# Patient Record
Sex: MALE | Race: WHITE | NOT HISPANIC OR LATINO | Employment: OTHER | URBAN - NONMETROPOLITAN AREA
[De-identification: names, ages, dates, MRNs, and addresses within clinical notes are randomized per-mention and may not be internally consistent; named-entity substitution may affect disease eponyms.]

---

## 2017-02-23 ENCOUNTER — OFFICE VISIT (OUTPATIENT)
Dept: NEUROSURGERY | Facility: CLINIC | Age: 62
End: 2017-02-23

## 2017-02-23 VITALS
TEMPERATURE: 97.2 F | SYSTOLIC BLOOD PRESSURE: 144 MMHG | RESPIRATION RATE: 20 BRPM | BODY MASS INDEX: 31.36 KG/M2 | WEIGHT: 224 LBS | DIASTOLIC BLOOD PRESSURE: 81 MMHG | OXYGEN SATURATION: 95 % | HEART RATE: 69 BPM | HEIGHT: 71 IN

## 2017-02-23 DIAGNOSIS — M51.26 HERNIATED LUMBAR INTERVERTEBRAL DISC: Primary | ICD-10-CM

## 2017-02-23 PROCEDURE — 99203 OFFICE O/P NEW LOW 30 MIN: CPT | Performed by: NEUROLOGICAL SURGERY

## 2017-02-23 RX ORDER — OXYCODONE AND ACETAMINOPHEN 7.5; 325 MG/1; MG/1
TABLET ORAL
Refills: 0 | COMMUNITY
Start: 2016-11-18 | End: 2017-04-22 | Stop reason: HOSPADM

## 2017-02-23 RX ORDER — PIOGLITAZONEHYDROCHLORIDE 30 MG/1
30 TABLET ORAL DAILY
COMMUNITY
Start: 2017-02-22 | End: 2020-11-20

## 2017-02-23 RX ORDER — LEVOTHYROXINE SODIUM 0.05 MG/1
75 TABLET ORAL DAILY
COMMUNITY
End: 2017-10-02 | Stop reason: DRUGHIGH

## 2017-02-23 RX ORDER — METAXALONE 800 MG/1
800 TABLET ORAL 3 TIMES DAILY PRN
COMMUNITY
End: 2017-04-20

## 2017-02-23 RX ORDER — GLYBURIDE-METFORMIN HYDROCHLORIDE 5; 500 MG/1; MG/1
2 TABLET ORAL 2 TIMES DAILY WITH MEALS
COMMUNITY
Start: 2017-02-14 | End: 2020-11-20

## 2017-02-23 RX ORDER — CITALOPRAM 40 MG/1
40 TABLET ORAL DAILY
COMMUNITY

## 2017-02-23 RX ORDER — HYDROCODONE BITARTRATE AND ACETAMINOPHEN 7.5; 325 MG/1; MG/1
1 TABLET ORAL EVERY 8 HOURS PRN
COMMUNITY
End: 2017-04-22 | Stop reason: HOSPADM

## 2017-02-23 RX ORDER — INDOMETHACIN 50 MG/1
50 CAPSULE ORAL 3 TIMES DAILY PRN
COMMUNITY
End: 2017-04-20

## 2017-02-23 RX ORDER — SIMVASTATIN 40 MG
40 TABLET ORAL NIGHTLY
COMMUNITY
Start: 2017-02-10 | End: 2020-11-20

## 2017-02-23 NOTE — PROGRESS NOTES
Grant Ann  1955  [id number]      Chief Complaint   Patient presents with   • Back Pain     sharp pains, worse when sitting, standing walking for long periods   • Numbness     in left leg       HISTORY OF PRESENT ILLNESS:  This is a 61-year-old who sustained a work-related injury 6/16 with the onset of pain in his back range to his left lower extremity.  This is been refractory to physical therapy of several weeks.  He is had his right knee operated on.  He is had a lumbar MRI and is referred for neurosurgical consultation.     Past Medical History   Diagnosis Date   • Arthritis    • Diabetes mellitus    • Low back pain        Past Surgical History   Procedure Laterality Date   • Knee arthroscopy w/ meniscal repair         Family History   Problem Relation Age of Onset   • No Known Problems Mother    • No Known Problems Father        Social History     Social History   • Marital status:      Spouse name: N/A   • Number of children: N/A   • Years of education: N/A     Occupational History   • Not on file.     Social History Main Topics   • Smoking status: Never Smoker   • Smokeless tobacco: Not on file   • Alcohol use No   • Drug use: Defer   • Sexual activity: Defer     Other Topics Concern   • Not on file     Social History Narrative   • No narrative on file       No Known Allergies      Current Outpatient Prescriptions:   •  Canagliflozin (INVOKANA) 100 MG tablet, Take  by mouth., Disp: , Rfl:   •  citalopram (CeleXA) 20 MG tablet, Take 20 mg by mouth Daily., Disp: , Rfl:   •  glyBURIDE-metFORMIN (GLUCOVANCE) 5-500 MG per tablet, , Disp: , Rfl:   •  HYDROcodone-acetaminophen (NORCO) 7.5-325 MG per tablet, Take 1 tablet by mouth Every 6 (Six) Hours As Needed for moderate pain (4-6)., Disp: , Rfl:   •  HYDROcodone-acetaminophen (VICODIN) 5-500 MG per tablet, Take 1 tablet by mouth Every 6 (Six) Hours As Needed for moderate pain (4-6)., Disp: , Rfl:   •  indomethacin (INDOCIN) 50 MG capsule, Take 50  "mg by mouth 3 (Three) Times a Day As Needed for mild pain (1-3)., Disp: , Rfl:   •  levothyroxine (SYNTHROID, LEVOTHROID) 50 MCG tablet, Take 50 mcg by mouth Daily., Disp: , Rfl:   •  metaxalone (SKELAXIN) 800 MG tablet, Take 800 mg by mouth 3 (Three) Times a Day As Needed for muscle spasms., Disp: , Rfl:   •  oxyCODONE-acetaminophen (PERCOCET) 7.5-325 MG per tablet, TK 1 T PO Q 4 H PRN P., Disp: , Rfl: 0  •  pioglitazone (ACTOS) 30 MG tablet, , Disp: , Rfl:   •  simvastatin (ZOCOR) 40 MG tablet, , Disp: , Rfl:     Review of Systems   Musculoskeletal: Positive for back pain and myalgias.   Neurological: Positive for numbness.   All other systems reviewed and are negative.      Neurological Examination:    Vitals:    02/23/17 1435   BP: 144/81   BP Location: Right arm   Patient Position: Sitting   Cuff Size: Adult   Pulse: 69   Resp: 20   Temp: 97.2 °F (36.2 °C)   TempSrc: Oral   SpO2: 95%   Weight: 224 lb (102 kg)   Height: 71\" (180.3 cm)       Mental status/speech: The patient is alert and oriented.  Speech is clear without aphysia or dysarthria.  No overt cognitive deficits.    Cranial nerve examination:    Olfaction: Smell is intact.  Vision: Vision is intact without visual field abnormalities.  Funduscopic examination is normal.  No pupillary irregularity.  Ocular motor examination: The extraocular muscles are intact.  There is no diplopia.  The pupil is round and reactive to both light and accommodation.  There is no nystagmus.  Facial movement/sensation: There is no facial weakness.  Sensation is intact in the first, second, and third divisions of the trigeminal nerve.  The corneal reflex is intact.  Auditory: Hearing is intact to finger rub bilaterally.  Cranial nerves IX, X, XI, XII: Phonation is normal.  No dysphagia.  Tongue is protruded in the midline without atrophy.  The gag reflex is intact.  Shoulder shrug is normal.    Musculoligamentous ligamentous examination: He has a brace on his right knee.  He " "has decreased range of motion.  He has a mildly positive straight leg raising on the left.  The left patellar reflex is diminished as is the right.    Medical Decision Making:     Diagnostic Data Set:  He is had a lumbar MRI.  I \"think\" it shows an extreme lateral disc protrusion L3/4 deviating toward left side.  It is been interpreted, however is showing disc degeneration without protrusion.      Assessment:  Possible disc herniation L3/4, extreme lateral           Recommendations:  I've recommended myelography, post-Myelogram CT scan and EMG and NCV both lower extremities.  He has subjective numbness in the distribution of the peroneal nerve on the right side where he has had recent knee operation.  He also needs to have a EMG and NCV on the left leg.        I greatly appreciate the opportunity to see and evaluate this individual.  If you have questions or concerns regarding issues that I may have overlooked please call me at any time: [phone].  Juarez Puente M.D.  [facility]  [address] [place] [address].  [place] [address]    "

## 2017-03-08 ENCOUNTER — HOSPITAL ENCOUNTER (OUTPATIENT)
Dept: INTERVENTIONAL RADIOLOGY/VASCULAR | Facility: HOSPITAL | Age: 62
Discharge: HOME | End: 2017-03-08
Attending: NEUROLOGICAL SURGERY | Admitting: NEUROLOGICAL SURGERY

## 2017-03-08 ENCOUNTER — APPOINTMENT (OUTPATIENT)
Dept: CT IMAGING | Facility: HOSPITAL | Age: 62
End: 2017-03-08

## 2017-03-08 ENCOUNTER — HOSPITAL ENCOUNTER (OUTPATIENT)
Dept: NEUROLOGY | Facility: HOSPITAL | Age: 62
Discharge: HOME | End: 2017-03-08
Attending: NEUROLOGICAL SURGERY | Admitting: NEUROLOGICAL SURGERY

## 2017-03-08 VITALS
OXYGEN SATURATION: 98 % | HEIGHT: 71 IN | WEIGHT: 226 LBS | SYSTOLIC BLOOD PRESSURE: 152 MMHG | BODY MASS INDEX: 31.64 KG/M2 | DIASTOLIC BLOOD PRESSURE: 77 MMHG | TEMPERATURE: 97.3 F | RESPIRATION RATE: 16 BRPM | HEART RATE: 60 BPM

## 2017-03-08 LAB — GLUCOSE BLDC GLUCOMTR-MCNC: 99 MG/DL (ref 70–130)

## 2017-03-08 PROCEDURE — 95886 MUSC TEST DONE W/N TEST COMP: CPT

## 2017-03-08 PROCEDURE — 72265 MYELOGRAPHY L-S SPINE: CPT

## 2017-03-08 PROCEDURE — 63710000001 DIPHENHYDRAMINE PER 50 MG: Performed by: NEUROLOGICAL SURGERY

## 2017-03-08 PROCEDURE — 0 IOPAMIDOL 41 % SOLUTION: Performed by: NEUROLOGICAL SURGERY

## 2017-03-08 PROCEDURE — 82962 GLUCOSE BLOOD TEST: CPT

## 2017-03-08 PROCEDURE — 72131 CT LUMBAR SPINE W/O DYE: CPT

## 2017-03-08 PROCEDURE — 95910 NRV CNDJ TEST 7-8 STUDIES: CPT

## 2017-03-08 RX ORDER — LIDOCAINE HYDROCHLORIDE 10 MG/ML
10 INJECTION, SOLUTION INFILTRATION; PERINEURAL ONCE
Status: COMPLETED | OUTPATIENT
Start: 2017-03-08 | End: 2017-03-08

## 2017-03-08 RX ORDER — DIPHENHYDRAMINE HCL 50 MG
50 CAPSULE ORAL ONCE
Status: COMPLETED | OUTPATIENT
Start: 2017-03-08 | End: 2017-03-08

## 2017-03-08 RX ORDER — DIAZEPAM 5 MG/1
10 TABLET ORAL
Status: COMPLETED | OUTPATIENT
Start: 2017-03-08 | End: 2017-03-08

## 2017-03-08 RX ORDER — MELOXICAM 15 MG/1
15 TABLET ORAL DAILY
COMMUNITY
End: 2018-06-21 | Stop reason: ALTCHOICE

## 2017-03-08 RX ADMIN — IOPAMIDOL 20 ML: 408 INJECTION, SOLUTION INTRATHECAL at 09:25

## 2017-03-08 RX ADMIN — LIDOCAINE HYDROCHLORIDE 10 ML: 10 INJECTION, SOLUTION INFILTRATION; PERINEURAL at 09:25

## 2017-03-08 RX ADMIN — DIAZEPAM 10 MG: 5 TABLET ORAL at 07:38

## 2017-03-08 RX ADMIN — DIPHENHYDRAMINE HYDROCHLORIDE 50 MG: 50 CAPSULE ORAL at 07:38

## 2017-03-09 ENCOUNTER — TELEPHONE (OUTPATIENT)
Dept: INFUSION THERAPY | Facility: HOSPITAL | Age: 62
End: 2017-03-09

## 2017-03-09 NOTE — TELEPHONE ENCOUNTER
@[id number])@    Other Comments:

## 2017-04-10 ENCOUNTER — PREP FOR SURGERY (OUTPATIENT)
Dept: NEUROSURGERY | Facility: CLINIC | Age: 62
End: 2017-04-10

## 2017-04-10 DIAGNOSIS — M51.26 LUMBAR HERNIATED DISC: Primary | ICD-10-CM

## 2017-04-10 RX ORDER — HYDROCODONE BITARTRATE AND ACETAMINOPHEN 7.5; 325 MG/1; MG/1
1 TABLET ORAL ONCE
Status: CANCELLED | OUTPATIENT
Start: 2017-04-10 | End: 2017-04-10

## 2017-04-10 RX ORDER — ACETAMINOPHEN 325 MG/1
650 TABLET ORAL ONCE
Status: CANCELLED | OUTPATIENT
Start: 2017-04-10 | End: 2017-04-10

## 2017-04-10 RX ORDER — SODIUM CHLORIDE, SODIUM LACTATE, POTASSIUM CHLORIDE, CALCIUM CHLORIDE 600; 310; 30; 20 MG/100ML; MG/100ML; MG/100ML; MG/100ML
100 INJECTION, SOLUTION INTRAVENOUS CONTINUOUS
Status: CANCELLED | OUTPATIENT
Start: 2017-04-10

## 2017-04-10 RX ORDER — IBUPROFEN 200 MG
800 TABLET ORAL ONCE
Status: CANCELLED | OUTPATIENT
Start: 2017-04-10 | End: 2017-04-10

## 2017-04-10 RX ORDER — CHLORHEXIDINE GLUCONATE 4 G/100ML
SOLUTION TOPICAL
Qty: 120 ML | Refills: 0 | Status: SHIPPED | OUTPATIENT
Start: 2017-04-10 | End: 2017-04-22 | Stop reason: HOSPADM

## 2017-04-10 NOTE — H&P
"Grant Ann had his diagnostic studies today which included myelogram, post myelogram CT scan and EMG/NCV.     The EMG and NCV is normal. The CT scan and myelogram show the presence of disc herniation at L3-L4 deviating toward the left side. This provides clinical correlation to the symptom complex that he has at this time.     HISTORY OF PRESENT ILLNESS: This is a 61-year-old who sustained a work-related injury 6/16 with the onset of pain in his back range to his left lower extremity. This is been refractory to physical therapy of several weeks. He is had his right knee operated on. He is had a lumbar MRI. I \"think\" it shows an extreme lateral disc protrusion L3/4 deviating toward left side. It is been interpreted, however is showing disc degeneration without protrusion.         The diagnostic studies confirmed the presence of disc herniation which is consistent with the MRI of findings of my interpretation. I have offered surgical intervention which would include excision of a herniated disc at L3 4 on the left side. I cannot offer a 100% assurance that his symptoms will be sufficiently alleviated to [place] to return to unrestricted duty. Nevertheless, in my opinion, there is little else to offer him other than him \" living with it\".     He will take this under consideration and call our office when he is made his decision. He will be returned to the care of his primary care physician    Chief Complaint   Patient presents with   • Back Pain       sharp pains, worse when sitting, standing walking for long periods   • Numbness       in left leg         HISTORY OF PRESENT ILLNESS: This is a 61-year-old who sustained a work-related injury 6/16 with the onset of pain in his back range to his left lower extremity. This is been refractory to physical therapy of several weeks. He is had his right knee operated on. He is had a lumbar MRI and is referred for neurosurgical consultation.       Medical History         Past " Medical History   Diagnosis Date   • Arthritis     • Diabetes mellitus     • Low back pain               Surgical History          Past Surgical History   Procedure Laterality Date   • Knee arthroscopy w/ meniscal repair                      Family History   Problem Relation Age of Onset   • No Known Problems Mother     • No Known Problems Father            Social History    Social History            Social History   • Marital status:        Spouse name: N/A   • Number of children: N/A   • Years of education: N/A          Occupational History   • Not on file.           Social History Main Topics   • Smoking status: Never Smoker   • Smokeless tobacco: Not on file   • Alcohol use No   • Drug use: Defer   • Sexual activity: Defer           Other Topics Concern   • Not on file          Social History Narrative   • No narrative on file            No Known Allergies        Current Outpatient Prescriptions:   • Canagliflozin (INVOKANA) 100 MG tablet, Take by mouth., Disp: , Rfl:   • citalopram (CeleXA) 20 MG tablet, Take 20 mg by mouth Daily., Disp: , Rfl:   • glyBURIDE-metFORMIN (GLUCOVANCE) 5-500 MG per tablet, , Disp: , Rfl:   • HYDROcodone-acetaminophen (NORCO) 7.5-325 MG per tablet, Take 1 tablet by mouth Every 6 (Six) Hours As Needed for moderate pain (4-6)., Disp: , Rfl:   • HYDROcodone-acetaminophen (VICODIN) 5-500 MG per tablet, Take 1 tablet by mouth Every 6 (Six) Hours As Needed for moderate pain (4-6)., Disp: , Rfl:   • indomethacin (INDOCIN) 50 MG capsule, Take 50 mg by mouth 3 (Three) Times a Day As Needed for mild pain (1-3)., Disp: , Rfl:   • levothyroxine (SYNTHROID, LEVOTHROID) 50 MCG tablet, Take 50 mcg by mouth Daily., Disp: , Rfl:   • metaxalone (SKELAXIN) 800 MG tablet, Take 800 mg by mouth 3 (Three) Times a Day As Needed for muscle spasms., Disp: , Rfl:   • oxyCODONE-acetaminophen (PERCOCET) 7.5-325 MG per tablet, TK 1 T PO Q 4 H PRN P., Disp: , Rfl: 0  • pioglitazone (ACTOS) 30 MG tablet, ,  "Disp: , Rfl:   • simvastatin (ZOCOR) 40 MG tablet, , Disp: , Rfl:      Review of Systems   Musculoskeletal: Positive for back pain and myalgias.   Neurological: Positive for numbness.   All other systems reviewed and are negative.        Neurological Examination:      Vitals        Vitals:     02/23/17 1435   BP: 144/81   BP Location: Right arm   Patient Position: Sitting   Cuff Size: Adult   Pulse: 69   Resp: 20   Temp: 97.2 °F (36.2 °C)   TempSrc: Oral   SpO2: 95%   Weight: 224 lb (102 kg)   Height: 71\" (180.3 cm)            Mental status/speech: The patient is alert and oriented. Speech is clear without aphysia or dysarthria. No overt cognitive deficits.     Cranial nerve examination:     Olfaction: Smell is intact.  Vision: Vision is intact without visual field abnormalities. Funduscopic examination is normal. No pupillary irregularity.  Ocular motor examination: The extraocular muscles are intact. There is no diplopia. The pupil is round and reactive to both light and accommodation. There is no nystagmus.  Facial movement/sensation: There is no facial weakness. Sensation is intact in the first, second, and third divisions of the trigeminal nerve. The corneal reflex is intact.  Auditory: Hearing is intact to finger rub bilaterally.  Cranial nerves IX, X, XI, XII: Phonation is normal. No dysphagia. Tongue is protruded in the midline without atrophy. The gag reflex is intact. Shoulder shrug is normal.     Musculoligamentous ligamentous examination: He has a brace on his right knee. He has decreased range of motion. He has a mildly positive straight leg raising on the left. The left patellar reflex is diminished as is the right.     Medical Decision Making:     Diagnostic Data Set: He is had a lumbar MRI. I \"think\" it shows an extreme lateral disc protrusion L3/4 deviating toward left side. It is been interpreted, however is showing disc degeneration without protrusion.     Assessment: Possible disc herniation " L3/4, extreme lateral       Recommendations: I've recommended myelography, post-Myelogram CT scan and EMG and NCV both lower extremities. He has subjective numbness in the distribution of the peroneal nerve on the right side where he has had recent knee operation. He also needs to have a EMG and NCV on the left leg.

## 2017-04-20 ENCOUNTER — APPOINTMENT (OUTPATIENT)
Dept: PREADMISSION TESTING | Facility: HOSPITAL | Age: 62
End: 2017-04-20

## 2017-04-20 VITALS — WEIGHT: 228.62 LBS | BODY MASS INDEX: 32.01 KG/M2 | HEIGHT: 71 IN

## 2017-04-20 DIAGNOSIS — M51.26 LUMBAR HERNIATED DISC: ICD-10-CM

## 2017-04-20 LAB
ANION GAP SERPL CALCULATED.3IONS-SCNC: 6 MMOL/L (ref 3–11)
BASOPHILS # BLD AUTO: 0.03 10*3/MM3 (ref 0–0.2)
BASOPHILS NFR BLD AUTO: 0.4 % (ref 0–1)
BUN BLD-MCNC: 19 MG/DL (ref 9–23)
BUN/CREAT SERPL: 19 (ref 7–25)
CALCIUM SPEC-SCNC: 10.5 MG/DL (ref 8.7–10.4)
CHLORIDE SERPL-SCNC: 104 MMOL/L (ref 99–109)
CO2 SERPL-SCNC: 32 MMOL/L (ref 20–31)
CREAT BLD-MCNC: 1 MG/DL (ref 0.6–1.3)
DEPRECATED RDW RBC AUTO: 46.7 FL (ref 37–54)
EOSINOPHIL # BLD AUTO: 0.16 10*3/MM3 (ref 0.1–0.3)
EOSINOPHIL NFR BLD AUTO: 2.3 % (ref 0–3)
ERYTHROCYTE [DISTWIDTH] IN BLOOD BY AUTOMATED COUNT: 12.9 % (ref 11.3–14.5)
GFR SERPL CREATININE-BSD FRML MDRD: 76 ML/MIN/1.73
GLUCOSE BLD-MCNC: 112 MG/DL (ref 70–100)
HCT VFR BLD AUTO: 43.2 % (ref 38.9–50.9)
HGB BLD-MCNC: 14 G/DL (ref 13.1–17.5)
IMM GRANULOCYTES # BLD: 0.02 10*3/MM3 (ref 0–0.03)
IMM GRANULOCYTES NFR BLD: 0.3 % (ref 0–0.6)
LYMPHOCYTES # BLD AUTO: 2.15 10*3/MM3 (ref 0.6–4.8)
LYMPHOCYTES NFR BLD AUTO: 31 % (ref 24–44)
MCH RBC QN AUTO: 31.7 PG (ref 27–31)
MCHC RBC AUTO-ENTMCNC: 32.4 G/DL (ref 32–36)
MCV RBC AUTO: 98 FL (ref 80–99)
MONOCYTES # BLD AUTO: 0.56 10*3/MM3 (ref 0–1)
MONOCYTES NFR BLD AUTO: 8.1 % (ref 0–12)
MRSA DNA SPEC QL NAA+PROBE: NEGATIVE
NEUTROPHILS # BLD AUTO: 4.01 10*3/MM3 (ref 1.5–8.3)
NEUTROPHILS NFR BLD AUTO: 57.9 % (ref 41–71)
PLATELET # BLD AUTO: 216 10*3/MM3 (ref 150–450)
PMV BLD AUTO: 10.7 FL (ref 6–12)
POTASSIUM BLD-SCNC: 4.4 MMOL/L (ref 3.5–5.5)
RBC # BLD AUTO: 4.41 10*6/MM3 (ref 4.2–5.76)
SODIUM BLD-SCNC: 142 MMOL/L (ref 132–146)
WBC NRBC COR # BLD: 6.93 10*3/MM3 (ref 3.5–10.8)

## 2017-04-20 PROCEDURE — 93005 ELECTROCARDIOGRAM TRACING: CPT

## 2017-04-20 PROCEDURE — 93010 ELECTROCARDIOGRAM REPORT: CPT | Performed by: INTERNAL MEDICINE

## 2017-04-20 PROCEDURE — 80048 BASIC METABOLIC PNL TOTAL CA: CPT

## 2017-04-20 PROCEDURE — 87641 MR-STAPH DNA AMP PROBE: CPT

## 2017-04-20 PROCEDURE — 85025 COMPLETE CBC W/AUTO DIFF WBC: CPT

## 2017-04-20 PROCEDURE — 36415 COLL VENOUS BLD VENIPUNCTURE: CPT

## 2017-04-20 NOTE — PAT
"JACKY DOWLING NOTIFIED OF \"BOIL\" ON PATIENTS BACK.  COVERED WITH BANDAID.  SMALL AMOUNT PUS COLORED DRAINAGE NOTED.  STATED IT WAS \"OK\".  "

## 2017-04-20 NOTE — DISCHARGE INSTRUCTIONS
The following information and instructions were given:    NPO after MN except sips of water with routine prescribed medication (except blood thinner, diabetes, or weight reducing medication) unless otherwise instructed by your physician.  Do not eat, drink, smoke or chew gum after MN the night before surgery. This also includes no mints.    DO NOT shave, wear makeup or dark nail polish.    Remove all jewelry (advised to go to jeweler if unable to remove).    Leave anything you consider valuable at home.    Leave your suitcase in the car until after your surgery.    Bring the following with you (if applicable)   -picture ID and insurance cards   -Co-pay/deductible required by insurance   -Medications in the original bottles (not a list) including all over-the-counter  medications if not brought to PAT   -Copy of advance directive, living will or power of  documents if not  brought to PAT   -CPAP or BIPAP mask and tubing (do not bring machine)   -Skin prep instructions sheet   -PAT Pass   Education booklet, brochure, handout or binder given to patient.    Pain Control After Surgery handout given to patient.    Respirex use (handout given to patient) and pneumonia prevention.    Signs and Symptoms of infection.    DVT Prevention stressing the importance of ambulation.   WIPES GIVEN.  Patient to apply Chlorhexadine wipes to surgical area (as instructed) the night before procedure and the AM of procedure.

## 2017-04-21 ENCOUNTER — HOSPITAL ENCOUNTER (OUTPATIENT)
Facility: HOSPITAL | Age: 62
Setting detail: OBSERVATION
Discharge: HOME | End: 2017-04-22
Attending: NEUROLOGICAL SURGERY | Admitting: NEUROLOGICAL SURGERY

## 2017-04-21 ENCOUNTER — APPOINTMENT (OUTPATIENT)
Dept: GENERAL RADIOLOGY | Facility: HOSPITAL | Age: 62
End: 2017-04-21

## 2017-04-21 ENCOUNTER — ANESTHESIA EVENT (OUTPATIENT)
Dept: PERIOP | Facility: HOSPITAL | Age: 62
End: 2017-04-21

## 2017-04-21 ENCOUNTER — ANESTHESIA (OUTPATIENT)
Dept: PERIOP | Facility: HOSPITAL | Age: 62
End: 2017-04-21

## 2017-04-21 DIAGNOSIS — Z74.09 IMPAIRED FUNCTIONAL MOBILITY, BALANCE, GAIT, AND ENDURANCE: Primary | ICD-10-CM

## 2017-04-21 DIAGNOSIS — Z78.9 IMPAIRED MOBILITY AND ADLS: ICD-10-CM

## 2017-04-21 DIAGNOSIS — Z74.09 IMPAIRED MOBILITY AND ADLS: ICD-10-CM

## 2017-04-21 DIAGNOSIS — M51.26 LUMBAR HERNIATED DISC: ICD-10-CM

## 2017-04-21 LAB
GLUCOSE BLDC GLUCOMTR-MCNC: 247 MG/DL (ref 70–130)
GLUCOSE BLDC GLUCOMTR-MCNC: 287 MG/DL (ref 70–130)
GLUCOSE BLDC GLUCOMTR-MCNC: 75 MG/DL (ref 70–130)

## 2017-04-21 PROCEDURE — 94799 UNLISTED PULMONARY SVC/PX: CPT

## 2017-04-21 PROCEDURE — 63030 LAMOT DCMPRN NRV RT 1 LMBR: CPT | Performed by: NEUROLOGICAL SURGERY

## 2017-04-21 PROCEDURE — 25810000003 POTASSIUM CHLORIDE PER 2 MEQ: Performed by: NEUROLOGICAL SURGERY

## 2017-04-21 PROCEDURE — 25010000002 PROPOFOL 10 MG/ML EMULSION: Performed by: NURSE ANESTHETIST, CERTIFIED REGISTERED

## 2017-04-21 PROCEDURE — 25010000002 FENTANYL CITRATE (PF) 100 MCG/2ML SOLUTION: Performed by: NURSE ANESTHETIST, CERTIFIED REGISTERED

## 2017-04-21 PROCEDURE — 63710000001 DEXAMETHASONE PER 0.25 MG: Performed by: NEUROLOGICAL SURGERY

## 2017-04-21 PROCEDURE — G0378 HOSPITAL OBSERVATION PER HR: HCPCS

## 2017-04-21 PROCEDURE — 25010000003 CEFAZOLIN IN DEXTROSE 2-4 GM/100ML-% SOLUTION: Performed by: NEUROLOGICAL SURGERY

## 2017-04-21 PROCEDURE — 72020 X-RAY EXAM OF SPINE 1 VIEW: CPT

## 2017-04-21 PROCEDURE — 25010000002 DEXAMETHASONE PER 1 MG: Performed by: PHYSICIAN ASSISTANT

## 2017-04-21 PROCEDURE — 82962 GLUCOSE BLOOD TEST: CPT

## 2017-04-21 PROCEDURE — 25010000002 ONDANSETRON PER 1 MG: Performed by: NURSE ANESTHETIST, CERTIFIED REGISTERED

## 2017-04-21 PROCEDURE — 25010000002 PHENYLEPHRINE PER 1 ML: Performed by: NURSE ANESTHETIST, CERTIFIED REGISTERED

## 2017-04-21 PROCEDURE — 25010000002 NEOSTIGMINE PER 0.5 MG: Performed by: NURSE ANESTHETIST, CERTIFIED REGISTERED

## 2017-04-21 PROCEDURE — 63710000001 INSULIN LISPRO (HUMAN) PER 5 UNITS: Performed by: NURSE ANESTHETIST, CERTIFIED REGISTERED

## 2017-04-21 RX ORDER — ONDANSETRON 2 MG/ML
INJECTION INTRAMUSCULAR; INTRAVENOUS AS NEEDED
Status: DISCONTINUED | OUTPATIENT
Start: 2017-04-21 | End: 2017-04-21 | Stop reason: SURG

## 2017-04-21 RX ORDER — CEFAZOLIN SODIUM 2 G/100ML
2 INJECTION, SOLUTION INTRAVENOUS ONCE
Status: COMPLETED | OUTPATIENT
Start: 2017-04-21 | End: 2017-04-21

## 2017-04-21 RX ORDER — SODIUM CHLORIDE 9 MG/ML
INJECTION, SOLUTION INTRAVENOUS AS NEEDED
Status: DISCONTINUED | OUTPATIENT
Start: 2017-04-21 | End: 2017-04-21 | Stop reason: HOSPADM

## 2017-04-21 RX ORDER — ROCURONIUM BROMIDE 10 MG/ML
INJECTION, SOLUTION INTRAVENOUS AS NEEDED
Status: DISCONTINUED | OUTPATIENT
Start: 2017-04-21 | End: 2017-04-21 | Stop reason: SURG

## 2017-04-21 RX ORDER — HYDROCODONE BITARTRATE AND ACETAMINOPHEN 7.5; 325 MG/1; MG/1
2 TABLET ORAL EVERY 4 HOURS PRN
Status: DISCONTINUED | OUTPATIENT
Start: 2017-04-21 | End: 2017-04-22 | Stop reason: HOSPADM

## 2017-04-21 RX ORDER — ATORVASTATIN CALCIUM 40 MG/1
40 TABLET, FILM COATED ORAL NIGHTLY
Status: DISCONTINUED | OUTPATIENT
Start: 2017-04-21 | End: 2017-04-22 | Stop reason: HOSPADM

## 2017-04-21 RX ORDER — FAMOTIDINE 20 MG/1
20 TABLET, FILM COATED ORAL ONCE
Status: COMPLETED | OUTPATIENT
Start: 2017-04-21 | End: 2017-04-21

## 2017-04-21 RX ORDER — PROMETHAZINE HYDROCHLORIDE 25 MG/ML
12.5 INJECTION, SOLUTION INTRAMUSCULAR; INTRAVENOUS EVERY 6 HOURS PRN
Status: DISCONTINUED | OUTPATIENT
Start: 2017-04-21 | End: 2017-04-22 | Stop reason: HOSPADM

## 2017-04-21 RX ORDER — FENTANYL CITRATE 50 UG/ML
50 INJECTION, SOLUTION INTRAMUSCULAR; INTRAVENOUS
Status: DISCONTINUED | OUTPATIENT
Start: 2017-04-21 | End: 2017-04-21 | Stop reason: HOSPADM

## 2017-04-21 RX ORDER — LEVOTHYROXINE SODIUM 0.07 MG/1
75 TABLET ORAL DAILY
Status: DISCONTINUED | OUTPATIENT
Start: 2017-04-21 | End: 2017-04-22 | Stop reason: HOSPADM

## 2017-04-21 RX ORDER — CITALOPRAM 20 MG/1
20 TABLET ORAL DAILY
Status: DISCONTINUED | OUTPATIENT
Start: 2017-04-21 | End: 2017-04-22 | Stop reason: HOSPADM

## 2017-04-21 RX ORDER — TEMAZEPAM 15 MG/1
30 CAPSULE ORAL NIGHTLY PRN
Status: DISCONTINUED | OUTPATIENT
Start: 2017-04-21 | End: 2017-04-22 | Stop reason: HOSPADM

## 2017-04-21 RX ORDER — ALPRAZOLAM 0.5 MG/1
0.5 TABLET ORAL 3 TIMES DAILY PRN
Status: DISCONTINUED | OUTPATIENT
Start: 2017-04-21 | End: 2017-04-22 | Stop reason: HOSPADM

## 2017-04-21 RX ORDER — PROMETHAZINE HYDROCHLORIDE 12.5 MG/1
12.5 TABLET ORAL EVERY 6 HOURS PRN
Status: DISCONTINUED | OUTPATIENT
Start: 2017-04-21 | End: 2017-04-22 | Stop reason: HOSPADM

## 2017-04-21 RX ORDER — SODIUM CHLORIDE, SODIUM LACTATE, POTASSIUM CHLORIDE, CALCIUM CHLORIDE 600; 310; 30; 20 MG/100ML; MG/100ML; MG/100ML; MG/100ML
9 INJECTION, SOLUTION INTRAVENOUS CONTINUOUS
Status: DISCONTINUED | OUTPATIENT
Start: 2017-04-21 | End: 2017-04-22 | Stop reason: HOSPADM

## 2017-04-21 RX ORDER — CARISOPRODOL 350 MG/1
350 TABLET ORAL 4 TIMES DAILY PRN
Status: DISCONTINUED | OUTPATIENT
Start: 2017-04-21 | End: 2017-04-22 | Stop reason: HOSPADM

## 2017-04-21 RX ORDER — ACETAMINOPHEN 325 MG/1
650 TABLET ORAL ONCE
Status: COMPLETED | OUTPATIENT
Start: 2017-04-21 | End: 2017-04-21

## 2017-04-21 RX ORDER — PROMETHAZINE HYDROCHLORIDE 25 MG/ML
6.25 INJECTION, SOLUTION INTRAMUSCULAR; INTRAVENOUS ONCE AS NEEDED
Status: DISCONTINUED | OUTPATIENT
Start: 2017-04-21 | End: 2017-04-21 | Stop reason: HOSPADM

## 2017-04-21 RX ORDER — IBUPROFEN 800 MG/1
800 TABLET ORAL ONCE
Status: COMPLETED | OUTPATIENT
Start: 2017-04-21 | End: 2017-04-21

## 2017-04-21 RX ORDER — BUPIVACAINE HYDROCHLORIDE 2.5 MG/ML
INJECTION, SOLUTION EPIDURAL; INFILTRATION; INTRACAUDAL AS NEEDED
Status: DISCONTINUED | OUTPATIENT
Start: 2017-04-21 | End: 2017-04-21 | Stop reason: HOSPADM

## 2017-04-21 RX ORDER — SODIUM CHLORIDE 0.9 % (FLUSH) 0.9 %
1-10 SYRINGE (ML) INJECTION AS NEEDED
Status: DISCONTINUED | OUTPATIENT
Start: 2017-04-21 | End: 2017-04-21

## 2017-04-21 RX ORDER — HYDROMORPHONE HYDROCHLORIDE 1 MG/ML
0.5 INJECTION, SOLUTION INTRAMUSCULAR; INTRAVENOUS; SUBCUTANEOUS
Status: DISCONTINUED | OUTPATIENT
Start: 2017-04-21 | End: 2017-04-21 | Stop reason: HOSPADM

## 2017-04-21 RX ORDER — SODIUM CHLORIDE AND POTASSIUM CHLORIDE 150; 450 MG/100ML; MG/100ML
75 INJECTION, SOLUTION INTRAVENOUS CONTINUOUS
Status: DISCONTINUED | OUTPATIENT
Start: 2017-04-21 | End: 2017-04-22 | Stop reason: HOSPADM

## 2017-04-21 RX ORDER — ONDANSETRON 2 MG/ML
4 INJECTION INTRAMUSCULAR; INTRAVENOUS ONCE AS NEEDED
Status: DISCONTINUED | OUTPATIENT
Start: 2017-04-21 | End: 2017-04-21 | Stop reason: HOSPADM

## 2017-04-21 RX ORDER — PROPOFOL 10 MG/ML
VIAL (ML) INTRAVENOUS AS NEEDED
Status: DISCONTINUED | OUTPATIENT
Start: 2017-04-21 | End: 2017-04-21 | Stop reason: SURG

## 2017-04-21 RX ORDER — NALOXONE HCL 0.4 MG/ML
0.4 VIAL (ML) INJECTION
Status: DISCONTINUED | OUTPATIENT
Start: 2017-04-21 | End: 2017-04-22 | Stop reason: HOSPADM

## 2017-04-21 RX ORDER — LIDOCAINE HYDROCHLORIDE 10 MG/ML
1 INJECTION, SOLUTION EPIDURAL; INFILTRATION; INTRACAUDAL; PERINEURAL ONCE
Status: DISCONTINUED | OUTPATIENT
Start: 2017-04-21 | End: 2017-04-21

## 2017-04-21 RX ORDER — MELOXICAM 7.5 MG/1
15 TABLET ORAL DAILY
Status: DISCONTINUED | OUTPATIENT
Start: 2017-04-21 | End: 2017-04-22 | Stop reason: HOSPADM

## 2017-04-21 RX ORDER — LIDOCAINE HYDROCHLORIDE 10 MG/ML
2 INJECTION, SOLUTION INFILTRATION; PERINEURAL ONCE
Status: COMPLETED | OUTPATIENT
Start: 2017-04-21 | End: 2017-04-21

## 2017-04-21 RX ORDER — PIOGLITAZONEHYDROCHLORIDE 15 MG/1
30 TABLET ORAL DAILY
Status: DISCONTINUED | OUTPATIENT
Start: 2017-04-21 | End: 2017-04-22 | Stop reason: HOSPADM

## 2017-04-21 RX ORDER — NALOXONE HCL 0.4 MG/ML
0.1 VIAL (ML) INJECTION
Status: DISCONTINUED | OUTPATIENT
Start: 2017-04-21 | End: 2017-04-22 | Stop reason: HOSPADM

## 2017-04-21 RX ORDER — FAMOTIDINE 10 MG/ML
20 INJECTION, SOLUTION INTRAVENOUS ONCE
Status: DISCONTINUED | OUTPATIENT
Start: 2017-04-21 | End: 2017-04-21

## 2017-04-21 RX ORDER — SODIUM CHLORIDE 0.9 % (FLUSH) 0.9 %
1-10 SYRINGE (ML) INJECTION AS NEEDED
Status: DISCONTINUED | OUTPATIENT
Start: 2017-04-21 | End: 2017-04-22 | Stop reason: HOSPADM

## 2017-04-21 RX ORDER — SODIUM CHLORIDE, SODIUM LACTATE, POTASSIUM CHLORIDE, CALCIUM CHLORIDE 600; 310; 30; 20 MG/100ML; MG/100ML; MG/100ML; MG/100ML
100 INJECTION, SOLUTION INTRAVENOUS CONTINUOUS
Status: DISCONTINUED | OUTPATIENT
Start: 2017-04-21 | End: 2017-04-21

## 2017-04-21 RX ORDER — LIDOCAINE HYDROCHLORIDE 10 MG/ML
INJECTION, SOLUTION INFILTRATION; PERINEURAL AS NEEDED
Status: DISCONTINUED | OUTPATIENT
Start: 2017-04-21 | End: 2017-04-21 | Stop reason: SURG

## 2017-04-21 RX ORDER — GLYBURIDE-METFORMIN HYDROCHLORIDE 5; 500 MG/1; MG/1
1 TABLET ORAL 2 TIMES DAILY WITH MEALS
Status: DISCONTINUED | OUTPATIENT
Start: 2017-04-21 | End: 2017-04-21

## 2017-04-21 RX ORDER — GLYCOPYRROLATE 0.2 MG/ML
INJECTION INTRAMUSCULAR; INTRAVENOUS AS NEEDED
Status: DISCONTINUED | OUTPATIENT
Start: 2017-04-21 | End: 2017-04-21 | Stop reason: SURG

## 2017-04-21 RX ORDER — PROMETHAZINE HYDROCHLORIDE 25 MG/1
25 TABLET ORAL ONCE AS NEEDED
Status: DISCONTINUED | OUTPATIENT
Start: 2017-04-21 | End: 2017-04-21 | Stop reason: HOSPADM

## 2017-04-21 RX ORDER — PROMETHAZINE HYDROCHLORIDE 25 MG/1
25 SUPPOSITORY RECTAL ONCE AS NEEDED
Status: DISCONTINUED | OUTPATIENT
Start: 2017-04-21 | End: 2017-04-21 | Stop reason: HOSPADM

## 2017-04-21 RX ORDER — GLYBURIDE-METFORMIN HYDROCHLORIDE 5; 500 MG/1; MG/1
2 TABLET ORAL 2 TIMES DAILY WITH MEALS
Status: DISCONTINUED | OUTPATIENT
Start: 2017-04-21 | End: 2017-04-22 | Stop reason: HOSPADM

## 2017-04-21 RX ORDER — MAGNESIUM HYDROXIDE 1200 MG/15ML
LIQUID ORAL AS NEEDED
Status: DISCONTINUED | OUTPATIENT
Start: 2017-04-21 | End: 2017-04-21 | Stop reason: HOSPADM

## 2017-04-21 RX ORDER — CEFAZOLIN SODIUM 2 G/100ML
2 INJECTION, SOLUTION INTRAVENOUS EVERY 8 HOURS
Status: COMPLETED | OUTPATIENT
Start: 2017-04-21 | End: 2017-04-21

## 2017-04-21 RX ORDER — FAMOTIDINE 20 MG/1
20 TABLET, FILM COATED ORAL 2 TIMES DAILY
Status: DISCONTINUED | OUTPATIENT
Start: 2017-04-21 | End: 2017-04-22 | Stop reason: HOSPADM

## 2017-04-21 RX ORDER — FAMOTIDINE 10 MG/ML
20 INJECTION, SOLUTION INTRAVENOUS 2 TIMES DAILY
Status: DISCONTINUED | OUTPATIENT
Start: 2017-04-21 | End: 2017-04-22 | Stop reason: HOSPADM

## 2017-04-21 RX ORDER — ATRACURIUM BESYLATE 10 MG/ML
INJECTION, SOLUTION INTRAVENOUS AS NEEDED
Status: DISCONTINUED | OUTPATIENT
Start: 2017-04-21 | End: 2017-04-21 | Stop reason: SURG

## 2017-04-21 RX ORDER — PROMETHAZINE HYDROCHLORIDE 12.5 MG/1
12.5 SUPPOSITORY RECTAL EVERY 6 HOURS PRN
Status: DISCONTINUED | OUTPATIENT
Start: 2017-04-21 | End: 2017-04-22 | Stop reason: HOSPADM

## 2017-04-21 RX ORDER — FAMOTIDINE 20 MG/1
20 TABLET, FILM COATED ORAL ONCE
Status: DISCONTINUED | OUTPATIENT
Start: 2017-04-21 | End: 2017-04-21

## 2017-04-21 RX ORDER — HYDROCODONE BITARTRATE AND ACETAMINOPHEN 7.5; 325 MG/1; MG/1
1 TABLET ORAL ONCE
Status: COMPLETED | OUTPATIENT
Start: 2017-04-21 | End: 2017-04-21

## 2017-04-21 RX ORDER — FENTANYL CITRATE 50 UG/ML
INJECTION, SOLUTION INTRAMUSCULAR; INTRAVENOUS AS NEEDED
Status: DISCONTINUED | OUTPATIENT
Start: 2017-04-21 | End: 2017-04-21 | Stop reason: SURG

## 2017-04-21 RX ORDER — SODIUM CHLORIDE, SODIUM LACTATE, POTASSIUM CHLORIDE, CALCIUM CHLORIDE 600; 310; 30; 20 MG/100ML; MG/100ML; MG/100ML; MG/100ML
9 INJECTION, SOLUTION INTRAVENOUS CONTINUOUS
Status: DISCONTINUED | OUTPATIENT
Start: 2017-04-21 | End: 2017-04-21

## 2017-04-21 RX ORDER — DEXAMETHASONE 4 MG/1
4 TABLET ORAL EVERY 6 HOURS SCHEDULED
Status: DISCONTINUED | OUTPATIENT
Start: 2017-04-21 | End: 2017-04-22 | Stop reason: HOSPADM

## 2017-04-21 RX ORDER — DEXAMETHASONE SODIUM PHOSPHATE 4 MG/ML
4 INJECTION, SOLUTION INTRA-ARTICULAR; INTRALESIONAL; INTRAMUSCULAR; INTRAVENOUS; SOFT TISSUE EVERY 6 HOURS SCHEDULED
Status: DISCONTINUED | OUTPATIENT
Start: 2017-04-21 | End: 2017-04-22 | Stop reason: HOSPADM

## 2017-04-21 RX ADMIN — ATORVASTATIN CALCIUM 40 MG: 40 TABLET, FILM COATED ORAL at 21:21

## 2017-04-21 RX ADMIN — POTASSIUM CHLORIDE AND SODIUM CHLORIDE 75 ML/HR: 450; 150 INJECTION, SOLUTION INTRAVENOUS at 14:02

## 2017-04-21 RX ADMIN — Medication: at 10:05

## 2017-04-21 RX ADMIN — GLYBURIDE-METFORMIN HYDROCHLORIDE 2 TABLET: 5; 500 TABLET ORAL at 17:32

## 2017-04-21 RX ADMIN — HYDROCODONE BITARTRATE AND ACETAMINOPHEN 1 TABLET: 7.5; 325 TABLET ORAL at 06:08

## 2017-04-21 RX ADMIN — EPHEDRINE SULFATE 10 MG: 50 INJECTION INTRAMUSCULAR; INTRAVENOUS; SUBCUTANEOUS at 07:07

## 2017-04-21 RX ADMIN — ONDANSETRON 4 MG: 2 INJECTION INTRAMUSCULAR; INTRAVENOUS at 09:22

## 2017-04-21 RX ADMIN — INSULIN LISPRO 4 UNITS: 100 INJECTION, SOLUTION INTRAVENOUS; SUBCUTANEOUS at 10:40

## 2017-04-21 RX ADMIN — EPHEDRINE SULFATE 5 MG: 50 INJECTION INTRAMUSCULAR; INTRAVENOUS; SUBCUTANEOUS at 08:45

## 2017-04-21 RX ADMIN — LEVOTHYROXINE SODIUM 75 MCG: 75 TABLET ORAL at 13:59

## 2017-04-21 RX ADMIN — DEXAMETHASONE SODIUM PHOSPHATE 10 MG: 4 INJECTION, SOLUTION INTRAMUSCULAR; INTRAVENOUS at 07:02

## 2017-04-21 RX ADMIN — FENTANYL CITRATE 50 MCG: 50 INJECTION, SOLUTION INTRAMUSCULAR; INTRAVENOUS at 09:48

## 2017-04-21 RX ADMIN — MELOXICAM 15 MG: 7.5 TABLET ORAL at 14:00

## 2017-04-21 RX ADMIN — FENTANYL CITRATE 25 MCG: 50 INJECTION, SOLUTION INTRAMUSCULAR; INTRAVENOUS at 07:16

## 2017-04-21 RX ADMIN — PROPOFOL 200 MG: 10 INJECTION, EMULSION INTRAVENOUS at 06:50

## 2017-04-21 RX ADMIN — ROCURONIUM BROMIDE 10 MG: 10 INJECTION INTRAVENOUS at 07:41

## 2017-04-21 RX ADMIN — ACETAMINOPHEN 650 MG: 325 TABLET, FILM COATED ORAL at 06:08

## 2017-04-21 RX ADMIN — FENTANYL CITRATE 50 MCG: 50 INJECTION, SOLUTION INTRAMUSCULAR; INTRAVENOUS at 09:46

## 2017-04-21 RX ADMIN — Medication 5 MG: at 09:27

## 2017-04-21 RX ADMIN — FAMOTIDINE 20 MG: 20 TABLET ORAL at 17:30

## 2017-04-21 RX ADMIN — ATRACURIUM BESYLATE 10 MG: 10 INJECTION, SOLUTION INTRAVENOUS at 07:16

## 2017-04-21 RX ADMIN — SODIUM CHLORIDE, POTASSIUM CHLORIDE, SODIUM LACTATE AND CALCIUM CHLORIDE 9 ML/HR: 600; 310; 30; 20 INJECTION, SOLUTION INTRAVENOUS at 05:58

## 2017-04-21 RX ADMIN — FENTANYL CITRATE 75 MCG: 50 INJECTION, SOLUTION INTRAMUSCULAR; INTRAVENOUS at 06:50

## 2017-04-21 RX ADMIN — FAMOTIDINE 20 MG: 20 TABLET ORAL at 06:08

## 2017-04-21 RX ADMIN — PHENYLEPHRINE HYDROCHLORIDE 100 MCG: 10 INJECTION INTRAVENOUS at 09:15

## 2017-04-21 RX ADMIN — IBUPROFEN 800 MG: 800 TABLET ORAL at 06:08

## 2017-04-21 RX ADMIN — CEFAZOLIN SODIUM 2 G: 2 INJECTION, SOLUTION INTRAVENOUS at 21:21

## 2017-04-21 RX ADMIN — CITALOPRAM HYDROBROMIDE 20 MG: 20 TABLET ORAL at 13:59

## 2017-04-21 RX ADMIN — LIDOCAINE HYDROCHLORIDE 50 MG: 10 INJECTION, SOLUTION INFILTRATION; PERINEURAL at 06:50

## 2017-04-21 RX ADMIN — Medication: at 23:23

## 2017-04-21 RX ADMIN — LIDOCAINE HYDROCHLORIDE 0.2 ML: 10 INJECTION, SOLUTION EPIDURAL; INFILTRATION; INTRACAUDAL; PERINEURAL at 05:58

## 2017-04-21 RX ADMIN — PIOGLITAZONE HYDROCHLORIDE 30 MG: 15 TABLET ORAL at 14:01

## 2017-04-21 RX ADMIN — FENTANYL CITRATE 50 MCG: 50 INJECTION, SOLUTION INTRAMUSCULAR; INTRAVENOUS at 07:50

## 2017-04-21 RX ADMIN — EPHEDRINE SULFATE 5 MG: 50 INJECTION INTRAMUSCULAR; INTRAVENOUS; SUBCUTANEOUS at 08:36

## 2017-04-21 RX ADMIN — ROBINUL 0.6 MG: 0.2 INJECTION INTRAMUSCULAR; INTRAVENOUS at 09:27

## 2017-04-21 RX ADMIN — ATRACURIUM BESYLATE 20 MG: 10 INJECTION, SOLUTION INTRAVENOUS at 07:30

## 2017-04-21 RX ADMIN — DEXAMETHASONE 4 MG: 4 TABLET ORAL at 14:08

## 2017-04-21 RX ADMIN — CEFAZOLIN SODIUM 2 G: 2 INJECTION, SOLUTION INTRAVENOUS at 06:48

## 2017-04-21 RX ADMIN — CEFAZOLIN SODIUM 2 G: 2 INJECTION, SOLUTION INTRAVENOUS at 16:11

## 2017-04-21 RX ADMIN — DEXAMETHASONE 4 MG: 4 TABLET ORAL at 23:23

## 2017-04-21 RX ADMIN — PHENYLEPHRINE HYDROCHLORIDE 100 MCG: 10 INJECTION INTRAVENOUS at 08:48

## 2017-04-21 RX ADMIN — SODIUM CHLORIDE, POTASSIUM CHLORIDE, SODIUM LACTATE AND CALCIUM CHLORIDE: 600; 310; 30; 20 INJECTION, SOLUTION INTRAVENOUS at 06:48

## 2017-04-21 RX ADMIN — DEXAMETHASONE 4 MG: 4 TABLET ORAL at 17:30

## 2017-04-21 RX ADMIN — EPHEDRINE SULFATE 5 MG: 50 INJECTION INTRAMUSCULAR; INTRAVENOUS; SUBCUTANEOUS at 08:27

## 2017-04-21 RX ADMIN — ATRACURIUM BESYLATE 40 MG: 10 INJECTION, SOLUTION INTRAVENOUS at 06:50

## 2017-04-21 RX ADMIN — SODIUM CHLORIDE, POTASSIUM CHLORIDE, SODIUM LACTATE AND CALCIUM CHLORIDE: 600; 310; 30; 20 INJECTION, SOLUTION INTRAVENOUS at 07:51

## 2017-04-21 NOTE — ANESTHESIA POSTPROCEDURE EVALUATION
Patient: Grant Ann    Procedure Summary     Date Anesthesia Start Anesthesia Stop Room / Location    04/21/17 0648 0953 [facility] / [facility]       Procedure Diagnosis Surgeon Provider    LEFT LUMBAR FORAMINOTOMY DISCECTOMY FAR LATERAL L3-4 (Left Spine Lumbar) Lumbar herniated disc  (Lumbar herniated disc [M51.26]) MD Chago Diaz MD          Anesthesia Type: general  Last vitals  /79 (04/21/17 0953)    Temp 97.5 °F (36.4 °C) (04/21/17 0953)    Pulse 98 (04/21/17 0953)   Resp 18 (04/21/17 0953)    SpO2 96 % (04/21/17 0953)      Post Anesthesia Care and Evaluation    Patient location during evaluation: PACU  Patient participation: complete - patient participated  Level of consciousness: awake and alert  Pain score: 0  Pain management: adequate  Airway patency: patent  Anesthetic complications: No anesthetic complications  PONV Status: none  Cardiovascular status: hemodynamically stable and acceptable  Respiratory status: nonlabored ventilation, acceptable and nasal cannula  Hydration status: acceptable

## 2017-04-21 NOTE — ANESTHESIA PROCEDURE NOTES
Airway  Airway not difficult    General Information and Staff    Patient location during procedure: OR  CRNA: TOMAS KAYE    Indications and Patient Condition  Indications for airway management: airway protection    Preoxygenated: yes  MILS not maintained throughout  Mask difficulty assessment: 2 - vent by mask + OA or adjuvant +/- NMBA    Final Airway Details  Final airway type: endotracheal airway      Successful airway: ETT  Cuffed: yes   Successful intubation technique: direct laryngoscopy  Facilitating devices/methods: intubating stylet  Endotracheal tube insertion site: oral  Blade: Jay  Blade size: #3  ETT size: 7.5 mm  Cormack-Lehane Classification: grade I - full view of glottis  Placement verified by: chest auscultation and capnometry   Measured from: lips  ETT to lips (cm): 21  Number of attempts at approach: 1    Additional Comments  Negative epigastric sounds, Breath sound equal bilaterally with symmetric chest rise and fall

## 2017-04-21 NOTE — ANESTHESIA PREPROCEDURE EVALUATION
Anesthesia Evaluation     Patient summary reviewed and Nursing notes reviewed   NPO Status: > 8 hours   Airway   Mallampati: II  TM distance: >3 FB  Neck ROM: full  no difficulty expected  Dental - normal exam     Pulmonary - negative pulmonary ROS and normal exam   Cardiovascular - negative cardio ROS and normal exam    ECG reviewed        Neuro/Psych- negative ROS  GI/Hepatic/Renal/Endo    (+)  diabetes mellitus, hypothyroidism,     Musculoskeletal     Abdominal  - normal exam    Bowel sounds: normal.   Substance History - negative use     OB/GYN negative ob/gyn ROS         Other   (+) arthritis                                 Anesthesia Plan    ASA 3     general     intravenous induction   Anesthetic plan and risks discussed with patient.    Plan discussed with CRNA.

## 2017-04-21 NOTE — OP NOTE
"Preoperative diagnosis: Herniated intervertebral disc L3-L4 and lateral recess syndrome L3-L4, left    Postoperative diagnosis: Same X    Operation performed: Intra-transprocess exploration and nerve root decompression; hemilaminectomy and foraminotomy L3-L4 and disc excision L3-L4, left    Surgeon: Norberto Puente  Assistant: Lela Gregg    Indication: Patient presents with chronic pain in his back rating into his anterior thigh.  Diagnostic studies showed what appeared to be a lateral disc protrusion with calcifications and closure of the lateral recess at L3-4 on the left side and he was admitted for surgical intervention.    .  The risks and benefits of the procedure were discussed with the patient preoperatively.  Consent forms were given and signatures obtained.  All questions were answered satisfactorily and the patient wished to proceed with with the procedure.    Operative report:     Subsequent to the induction of general anesthesia the patient was placed in a prone and flexed position.  Prep and drape was performed in the usual fashion.  Through a linear incision, the paraspinal musculature was dissected exposing the spinous process and lamina of L3..  The appropriate level was determined radiographically.  The dissection was carried out over the facet complexes of L2 3 and of L3 4 to expose the transverse processes.  A processes were identified and the end try transverse process space was explored.  There appeared to be a bulge of intravertebral disc however of his calcific.  Therefore, a more \"classic\" laminar approach to the disc was performed.        A self-retaining retractor was inserted.  A laminotomy was performed using the Kerrison punch and Midas Matthew high-speed drill.  Medial facetectomy and foraminotomy was carried out..  The ligamentum flavum was excised and the nerve root identified.    The surgical findings included : The ligament flavum was removed after hemilaminectomy and " foraminotomy to decompress the dural cylinder and the L4 nerve root which was markedly compromised.  There was a significant amount of epidural bleeding from a large venous Andrew's.  This was coagulated and hemostasis obtained with FloSeal and Gelfoam.    Having accomplished this a window was made in the disc space and a discectomy was performed out lateral above (superior) the pedicle.  Several large fragments were extruded and removed.  A Chauhan ball was placed through the neural foramen beneath and above the pedicle encountering no compressive elements.    The nerve root and dura were widely decompressed.  A blunt probe passed freely through the neural foramen.  No additional compressive elements were identified.  Hemostasis was obtained with the use of Gelfoam and FloSeal.  The wound was closed with multiple layers of Vicryl.  A sterile dressing was applied and the patient was taken to recovery in satisfactory condition.    Complications: None appreciated.

## 2017-04-21 NOTE — INTERVAL H&P NOTE
"Pre-Op H&P (See Recent Office Note Attached)    Chief complaint: Low back pain into LLE    HPI:      Patient is a 61 y.o. male who presents with L3-4 extreme lateral disc protrusion deviating toward left side and here today for left lumbar discectomy, far lateral L3-4    Review of Systems:  General ROS:  no fever, chills, rashes, No change since last office visit.  Skin area on right mid back with some yellow drainage.    Cardiovascular ROS: no chest pain or dyspnea on exertion  Respiratory ROS: no cough, shortness of breath, or wheezing    Immunization History:   Influenza:  no  Pneumococcal:  no  Tetanus:  unknown    Vital Signs:  /81 (BP Location: Right arm, Patient Position: Lying)  Pulse 68  Temp 97.3 °F (36.3 °C) (Temporal Artery )   Resp 16  Ht 71\" (180.3 cm)  Wt 221 lb (100 kg)  SpO2 97%  BMI 30.82 kg/m2    Physical Exam:    CV:  S1S2 regular rate and rhythm, no murmur               Resp:  Clear to auscultation; respirations regular, even and unlabored               Skin:  Small, dime size lesion with serous/nonodorous drainage, no surrounding erythema or edema    Results Review:    I reviewed the patient's new clinical results.    Cancer Staging (if applicable)  Cancer Patient: __ yes x__no __unknown; If yes, clinical stage T:__ N:__M:__, stage group or __N/A    Assessment/Plan:    L3-4 extreme lateral disc protrusion deviating toward left side and here today for left lumbar discectomy, far lateral L3-4    Deysi Olsen, APRN  4/21/2017 6:23 AM      "

## 2017-04-21 NOTE — BRIEF OP NOTE
LUMBAR DISCECTOMY POSTERIOR 1-2 LEVELS  Procedure Note    Grant Ann  4/21/2017    Pre-op Diagnosis:   Lumbar herniated disc [M51.26]    Post-op Diagnosis:     Post-Op Diagnosis Codes:     * Lumbar herniated disc [M51.26]    Procedure/CPT® Codes:      Procedure(s):  LEFT LUMBAR FORAMINOTOMY DISCECTOMY FAR LATERAL L3-4    Surgeon(s):  Norberto Puente MD    Anesthesia: General    Staff:   Circulator: Lissette Wahl RN  Radiology Technologist: Michael Odonnell RT  Scrub Person: Nicho Bueno  Nursing Assistant: Keiry Kiran  Assistant: Suzanna Gregg PA-C    Estimated Blood Loss: 200 mL  Urine Voided: * No values recorded between 4/21/2017  6:49 AM and 4/21/2017  9:29 AM *    Specimens:                * No specimens in log *      Drains:           Findings: Disc herniation associated with lateral recess stenosis    Complications: None  Norberto Puente MD     Date: 4/21/2017  Time: 9:29 AM

## 2017-04-22 VITALS
SYSTOLIC BLOOD PRESSURE: 119 MMHG | BODY MASS INDEX: 30.94 KG/M2 | DIASTOLIC BLOOD PRESSURE: 68 MMHG | HEIGHT: 71 IN | HEART RATE: 71 BPM | RESPIRATION RATE: 12 BRPM | OXYGEN SATURATION: 94 % | WEIGHT: 221 LBS | TEMPERATURE: 98 F

## 2017-04-22 LAB
BASOPHILS # BLD AUTO: 0 10*3/MM3 (ref 0–0.2)
BASOPHILS NFR BLD AUTO: 0 % (ref 0–1)
DEPRECATED RDW RBC AUTO: 47.6 FL (ref 37–54)
EOSINOPHIL # BLD AUTO: 0 10*3/MM3 (ref 0.1–0.3)
EOSINOPHIL NFR BLD AUTO: 0 % (ref 0–3)
ERYTHROCYTE [DISTWIDTH] IN BLOOD BY AUTOMATED COUNT: 13.2 % (ref 11.3–14.5)
GLUCOSE BLDC GLUCOMTR-MCNC: 240 MG/DL (ref 70–130)
GLUCOSE BLDC GLUCOMTR-MCNC: 325 MG/DL (ref 70–130)
HCT VFR BLD AUTO: 34.1 % (ref 38.9–50.9)
HGB BLD-MCNC: 11.1 G/DL (ref 13.1–17.5)
IMM GRANULOCYTES # BLD: 0.04 10*3/MM3 (ref 0–0.03)
IMM GRANULOCYTES NFR BLD: 0.2 % (ref 0–0.6)
LYMPHOCYTES # BLD AUTO: 0.96 10*3/MM3 (ref 0.6–4.8)
LYMPHOCYTES NFR BLD AUTO: 5.9 % (ref 24–44)
MCH RBC QN AUTO: 32.1 PG (ref 27–31)
MCHC RBC AUTO-ENTMCNC: 32.6 G/DL (ref 32–36)
MCV RBC AUTO: 98.6 FL (ref 80–99)
MONOCYTES # BLD AUTO: 1.02 10*3/MM3 (ref 0–1)
MONOCYTES NFR BLD AUTO: 6.3 % (ref 0–12)
NEUTROPHILS # BLD AUTO: 14.18 10*3/MM3 (ref 1.5–8.3)
NEUTROPHILS NFR BLD AUTO: 87.6 % (ref 41–71)
PLATELET # BLD AUTO: 187 10*3/MM3 (ref 150–450)
PMV BLD AUTO: 11 FL (ref 6–12)
RBC # BLD AUTO: 3.46 10*6/MM3 (ref 4.2–5.76)
WBC NRBC COR # BLD: 16.2 10*3/MM3 (ref 3.5–10.8)

## 2017-04-22 PROCEDURE — G8980 MOBILITY D/C STATUS: HCPCS

## 2017-04-22 PROCEDURE — G8987 SELF CARE CURRENT STATUS: HCPCS

## 2017-04-22 PROCEDURE — G8978 MOBILITY CURRENT STATUS: HCPCS

## 2017-04-22 PROCEDURE — 63710000001 DEXAMETHASONE PER 0.25 MG: Performed by: NEUROLOGICAL SURGERY

## 2017-04-22 PROCEDURE — 85025 COMPLETE CBC W/AUTO DIFF WBC: CPT | Performed by: NEUROLOGICAL SURGERY

## 2017-04-22 PROCEDURE — G0378 HOSPITAL OBSERVATION PER HR: HCPCS

## 2017-04-22 PROCEDURE — 82962 GLUCOSE BLOOD TEST: CPT

## 2017-04-22 PROCEDURE — G8988 SELF CARE GOAL STATUS: HCPCS

## 2017-04-22 PROCEDURE — 97530 THERAPEUTIC ACTIVITIES: CPT

## 2017-04-22 PROCEDURE — 97166 OT EVAL MOD COMPLEX 45 MIN: CPT

## 2017-04-22 PROCEDURE — G8979 MOBILITY GOAL STATUS: HCPCS

## 2017-04-22 PROCEDURE — 97162 PT EVAL MOD COMPLEX 30 MIN: CPT

## 2017-04-22 RX ORDER — CARISOPRODOL 350 MG/1
350 TABLET ORAL 4 TIMES DAILY PRN
Qty: 60 TABLET | Refills: 1 | Status: SHIPPED | OUTPATIENT
Start: 2017-04-22 | End: 2018-07-05

## 2017-04-22 RX ORDER — OXYCODONE AND ACETAMINOPHEN 10; 325 MG/1; MG/1
1 TABLET ORAL EVERY 6 HOURS PRN
Qty: 60 TABLET | Refills: 0 | Status: SHIPPED | OUTPATIENT
Start: 2017-04-22 | End: 2017-06-22

## 2017-04-22 RX ADMIN — Medication: at 00:10

## 2017-04-22 RX ADMIN — MELOXICAM 15 MG: 7.5 TABLET ORAL at 08:06

## 2017-04-22 RX ADMIN — DEXAMETHASONE 4 MG: 4 TABLET ORAL at 05:11

## 2017-04-22 RX ADMIN — HYDROCODONE BITARTRATE AND ACETAMINOPHEN 2 TABLET: 7.5; 325 TABLET ORAL at 12:56

## 2017-04-22 RX ADMIN — GLYBURIDE-METFORMIN HYDROCHLORIDE 2 TABLET: 5; 500 TABLET ORAL at 08:08

## 2017-04-22 RX ADMIN — Medication: at 10:38

## 2017-04-22 RX ADMIN — PIOGLITAZONE HYDROCHLORIDE 30 MG: 15 TABLET ORAL at 08:06

## 2017-04-22 RX ADMIN — CITALOPRAM HYDROBROMIDE 20 MG: 20 TABLET ORAL at 08:06

## 2017-04-22 RX ADMIN — DEXAMETHASONE 4 MG: 4 TABLET ORAL at 11:16

## 2017-04-22 RX ADMIN — LEVOTHYROXINE SODIUM 75 MCG: 75 TABLET ORAL at 08:06

## 2017-04-22 RX ADMIN — FAMOTIDINE 20 MG: 20 TABLET ORAL at 08:06

## 2017-04-22 NOTE — PLAN OF CARE
Problem: Patient Care Overview (Adult)  Goal: Plan of Care Review  Outcome: Outcome(s) achieved Date Met:  04/22/17 04/22/17 0928   Coping/Psychosocial Response Interventions   Plan Of Care Reviewed With patient;spouse   Outcome Evaluation   Outcome Summary/Follow up Plan PT eval completed. Pt s/p L3-L4 discectomy. Pt. amb in hallway 400 ft w/ RW and SBA, and negotiated 3 steps w/ handrail w/ CGA. Provided patient/CG education regarding home safety, spinal precautions, and issued handout for HEP. Anticipate D/C to home w/ wife's assist later today.   Patient Care Overview   Progress improving         Problem: Inpatient Physical Therapy  Goal: Gait Training Goal STG- PT  Outcome: Outcome(s) achieved Date Met:  04/22/17 04/22/17 0928   Gait Training PT STG   Gait Training Goal PT STG, Date Established 04/22/17   Gait Training Goal PT STG, Time to Achieve 1 day   Gait Training Goal PT STG, [place] Level supervision required   Gait Training Goal PT STG, Assist Device walker, rolling   Gait Training Goal PT STG, Distance to Achieve 350   Gait Training Goal PT STG, Outcome goal met       Goal: Stair Training Goal STG- PT  Outcome: Outcome(s) achieved Date Met:  04/22/17 04/22/17 0928   Stair Training PT STG   Stair Training Goal PT STG, Date Established 04/22/17   Stair Training Goal PT STG, Time to Achieve 1 day   Stair Training Goal PT STG, Number of Steps 2   Stair Training Goal PT STG, Burke Level contact guard assist   Stair Training Goal PT STG, Assist Device 2 handrails   Stair Training Goal PT STG, Outcome goal met

## 2017-04-22 NOTE — PROGRESS NOTES
Acute Care - Occupational Therapy Initial Evaluation  [facility]     Patient Name: Grant Ann  : 1955  MRN: [id number]  Today's Date: 2017  Onset of Illness/Injury or Date of Surgery Date: 17  Date of Referral to OT: 17  Referring Physician: MD Kwame    Admit Date: 2017       ICD-10-CM ICD-9-CM   1. Impaired functional mobility, balance, gait, and endurance Z74.09 V49.89   2. Lumbar herniated disc M51.26 722.10   3. Impaired mobility and ADLs Z74.09 799.89     Patient Active Problem List   Diagnosis   • Lumbar herniated disc     Past Medical History:   Diagnosis Date   • Arthritis    • Depression    • Diabetes mellitus     DX. , FSBS 1 X MONTH   • Disease of thyroid gland    • Heartburn    • High cholesterol    • Low back pain    • Recurrent boils    • Wears glasses    • Wears partial dentures      Past Surgical History:   Procedure Laterality Date   • KNEE ARTHROSCOPY W/ MENISCAL REPAIR Right 2016          OT ASSESSMENT FLOWSHEET (last 72 hours)      OT Evaluation       17 0820 17 0819 17 0017 17 2200 17 2045    Rehab Evaluation    Document Type evaluation  -MB evaluation  -TA       Subjective Information agree to therapy;complains of;pain  -MB agree to therapy;complains of;pain  -TA       Patient Effort, Rehab Treatment good  -MB good  -TA       Symptoms Noted During/After Treatment  none  -TA       General Information    Patient Profile Review yes  -MB yes  -TA       Onset of Illness/Injury or Date of Surgery Date 17  -MB 17  -TA       Referring Physician MD Kwame  -MB Dr Puente  -TA       General Observations Pt. supine w/ IV R UE, on 2 L O2 NC.  Nsg consent for PT.  Pt's wife present at bedside.  -MB Pt supine, IV intact L hand, PCA, 2L O2 per NC; wife present in room.  -TA       Pertinent History Of Current Problem Pt.adm for surgical management of back and L LE pain and dysfunction w/ L3-L4 extreme lateral disc  protusion.  Pt. s/p L3-L4 discectomy 4/21.  Pt. w/ h/o work-related injury 6/16 and R knee sx.  -MB Pt admitted with h/o back pain, lumbar herniated discs, now s/p analy-lami/foramenotomy L3-L4, Left  -TA       Precautions/Limitations fall precautions;spinal precautions  -MB fall precautions;spinal precautions  -TA       Prior Level of Function independent:;all household mobility;community mobility;gait;transfer;bed mobility;wound care;driving;using stairs  -MB independent:;all household mobility;gait;transfer;bed mobility;ADL's   progressively worsening pain with ambulation, LB ADLs  -TA       Equipment Currently Used at Home cane, straight;crutches, axillary  -MB crutches;cane, straight  -TA       Plans/Goals Discussed With patient;spouse/S.O.;agreed upon  -MB patient;spouse/S.O.;agreed upon  -TA       Risks Reviewed patient:;spouse/S.O.:;LOB;dizziness;increased discomfort;change in vital signs;increased drainage  -MB patient:;spouse/S.O.:;LOB;nausea/vomiting;dizziness;increased discomfort;change in vital signs;lines disloged  -TA       Benefits Reviewed patient:;spouse/S.O.:;improve function;increase independence;increase balance;decrease pain;decrease risk of DVT  -MB patient:;spouse/S.O.:;improve function;increase independence;increase strength;increase balance;decrease pain;increase knowledge  -TA       Barriers to Rehab none identified  -MB none identified  -TA       Living Environment    Lives With spouse  -MB spouse  -TA       Living Arrangements house  -MB house  -TA       Home Accessibility stairs to enter home  -MB bed and bath on same level  -TA       Number of Stairs to Enter Home 2  -MB        Clinical Impression    Date of Referral to OT  04/21/17  -TA       OT Diagnosis  Impaired mobility and ADLs  -TA       Impairments Found (describe specific impairments)  ergonomics and body mechanics;gait, locomotion, and balance;ROM;aerobic capacity/endurance  -TA       Patient/Family Goals Statement  Return  home.  -TA       Criteria for Skilled Therapeutic Interventions Met  yes;treatment indicated  -TA       Rehab Potential  good, to achieve stated therapy goals  -TA       Therapy Frequency  daily   Per priority policy  -TA       Anticipated Equipment Needs At Discharge  bedside commode;front wheeled walker   ADL kit except leg  issued, training initiated  -TA       Anticipated Discharge Disposition  home with assist  -TA       Vital Signs    Pre Systolic BP Rehab --   VSS.  Nsg cleared for tx.  -MB --   Nurse cleared pt for tx, vitals stable.  -TA       Pre Patient Position Supine  -MB Supine  -TA       Intra Patient Position Standing  -MB Standing  -TA       Post Patient Position Supine  -MB Supine  -TA       Pain Assessment    Pain Assessment 0-10  -MB 0-10  -TA       Pain Score 6  -MB 6  -TA       Post Pain Score 6  -MB 6  -TA       Pain Type Acute pain;Surgical pain  -MB Surgical pain  -TA       Pain Location Back  -MB Back  -TA       Pain Orientation  Lower  -TA       Pain Intervention(s) Medication (See MAR);Repositioned;Ambulation/increased activity   Pt. premedicated.  -MB Medication (See MAR);Repositioned;Ambulation/increased activity  -TA       Response to Interventions  tolerated  -TA       Vision Assessment/Intervention    Visual Impairment WFL  -MB WFL  -TA       Cognitive Assessment/Intervention    Current Cognitive/Communication Assessment functional  -MB functional  -TA       Orientation Status oriented x 4  -MB oriented x 4  -TA       Follows Commands/Answers Questions 100% of the time;able to follow single-step instructions  -% of the time;able to follow multi-step instructions  -TA       Personal Safety WNL/WFL  -MB WNL/WFL  -TA       Personal Safety Interventions fall prevention program maintained;gait belt;nonskid shoes/slippers when out of bed  -MB fall prevention program maintained;gait belt;nonskid shoes/slippers when out of bed;supervised activity  -TA       ROM (Range of Motion)     General ROM no range of motion deficits identified  -MB no range of motion deficits identified  -TA       General ROM Detail  BUEs  -TA       MMT (Manual Muscle Testing)    General MMT Assessment lower extremity strength deficits identified  -MB no strength deficits identified  -TA       General MMT Assessment Detail --   B LE not formally tested d/t sx.  Appear WFL.  -MB BUE formal testing deferred 2/2 spinal px's; grossly WFLs  -TA       Muscle Tone Assessment    Muscle Tone Assessment   Bilateral Lower Extremities  -KG Bilateral Lower Extremities  -KG Bilateral Lower Extremities  -KG    Bilateral Lower Extremities Muscle Tone Assessment   mildly decreased tone  -KG mildly decreased tone  -KG mildly decreased tone  -KG    Bed Mobility, Assessment/Treatment    Bed Mobility, Assistive Device head of bed elevated  -MB        Bed Mobility, Roll Left, [place] supervision required;verbal cues required   log roll technique  -MB supervision required;verbal cues required  -TA       Bed Mobility, Roll Right, [place] independent  -MB independent  -TA       Bed Mob, Sidelying to Sit, [place] supervision required;verbal cues required  -MB supervision required;verbal cues required  -TA       Bed Mob, Sit to Sidelying, [place] supervision required;verbal cues required  -MB supervision required;verbal cues required  -TA       Bed Mobility, Impairments ROM decreased;pain  -MB ROM decreased;pain  -TA       Bed Mobility, Comment VCs for log roll technique  -MB VCs for for log roll technique   -TA       Transfer Assessment/Treatment    Transfers, Sit-Stand [place] supervision required;verbal cues required  -MB supervision required;verbal cues required  -TA       Transfers, Stand-Sit [place] supervision required;verbal cues required  -MB supervision required;verbal cues required  -TA       Transfers, Sit-Stand-Sit, Assist Device rolling walker  -MB rolling walker  -TA       Transfer, Safety Issues --    None noted.  -MB        Transfer, Impairments pain;ROM decreased  -MB pain  -TA       Transfer, Comment VCs for safe hand placement and spinal precautions.  -MB        Functional Mobility    Functional Mobility- Ind. Level  contact guard assist;verbal cues required;1 person + 1 person to manage equipment  -TA       Functional Mobility- Device  rolling walker  -TA       Functional Mobility-Distance (Feet)  400  -TA       Functional Mobility- Safety Issues  balance decreased during turns  -TA       Functional Mobility- Comment  VCs for postural corrections  -TA       Stairs Assessment/Treatment    Number of Stairs 3  -MB        Stairs, Handrail Location both sides  -MB        Stairs, [place] Level contact guard assist;verbal cues required  -MB        Stairs, Impairments ROM decreased;pain  -MB        Stairs, Comment Pt. amb 3 steps x 2 w/ B handrails, CGA, and VCs for sequencing.  -MB Please refer to PT notes  -TA       Lower Body Bathing Assessment/Training    LB Bathing Assess/Train, Comment  OT demo'd use of AE; pt verb'd good understanding  -TA       Lower Body Dressing Assessment/Training    LB Dressing Assess/Train, Clothing Type  donning:;doffing:;slipper socks  -TA       LB Dressing Assess/Train, Assist Device  reacher;sock-aid;long-handled shoe horn  -TA       LB Dressing Assess/Train, Position  sitting  -TA       LB Dressing Assess/Train, [place]  supervision required;verbal cues required  -TA       LB Dressing Assess/Train, Impairments  ROM decreased  -TA       LB Dressing Assess/Train, Comment  Pt demo'd and verb'd good understanding of AE use with LBD  -TA       Toileting Assessment/Training    Toileting Assess/Train, Comment  OT educated pt on post toilet hygiene from front instead of reaching behind to maintain spinal precautions  -TA       Motor Skills/Interventions    Additional Documentation Balance Skills Training (Group)  -MB Balance Skills Training (Group);Fine Motor Coordination  Training (Group)  -TA       Balance Skills Training    Training Strategies (Balance Skills)  LBD  -TA       Sitting-Level of Assistance  Independent  -TA       Sitting-Balance Support  Feet supported  -TA       Sitting-Balance Activities  Lateral lean;Reaching for objects;Reaching across midline;Trunk control activities  -TA       Standing-Level of Assistance Contact guard  -MB Contact guard  -TA       Static Standing Balance Support assistive device  -MB assistive device  -TA       Standing-Balance Activities Weight Shift R-L;Weight Shift A-P  -MB Weight Shift A-P;Weight Shift R-L;Reaching for objects  -TA       Gait Balance Support assistive device  -MB        Gait Balance Activities side-stepping;scanning environment R/L  -MB        Therapy Exercises    Bilateral Lower Extremities LAQ;ankle pumps/circles;hip ER  -MB        Fine Motor Coordination Training    Opposition  Right:;Left:;intact;other (comment)   [place] [id number] intact  -TA       Sensory Assessment/Intervention    Light Touch LLE;RLE  -MB LUE;RUE  -TA       LUE Light Touch  other (see comments)   Pt states intermittent tingling in [place] hands at times.  -TA       LLE Light Touch mild impairment  -MB        RLE Light Touch WNL  -MB        General Therapy Interventions    Planned Therapy Interventions  adaptive equipment training;ADL retraining;balance training;bed mobility training;strengthening;transfer training  -TA       Positioning and Restraints    Pre-Treatment Position in bed  -MB in bed  -TA       Post Treatment Position bed  -MB bed  -TA       In Bed notified nsg;with OT  -MB supine;call light within reach;encouraged to call for assist;with family/caregiver;side rails up x2;legs elevated   IV intact L hand  -TA         04/21/17 1400 04/21/17 0600 04/20/17 [id number]          General Information    Equipment Currently Used at Home  cane, straight  -MT none  -MP      Living Environment    Lives With  spouse  -MT spouse  -MP      Living Arrangements   house  -MT house  -MP      Home Accessibility  bed and bath on same level  -MT bed and bath on same level;no concerns  -MP      Stair Railings at Home  none  -MT none  -MP      Type of Financial/Environmental Concern  none  -MT none  -MP      Transportation Available  car  -MT car  -MP      Functional Level Prior    Ambulation 0-->independent  -CA 0-->independent  -MT 0-->independent  -MP      Transferring 0-->independent  -CA 0-->independent  -MT 0-->independent  -MP      Toileting 0-->independent  -CA 0-->independent  -MT 0-->independent  -MP      Bathing 0-->independent  -CA 0-->independent  -MT 0-->independent  -MP      Dressing 0-->independent  -CA 0-->independent  -MT 0-->independent  -MP      Eating 0-->independent  -CA 0-->independent  -MT 0-->independent  -MP      Communication 0-->understands/communicates without difficulty  -CA 0-->understands/communicates without difficulty  -MT 0-->understands/communicates without difficulty  -MP      Swallowing 0-->swallows foods/liquids without difficulty  -CA 0-->swallows foods/liquids without difficulty  -MT 0-->swallows foods/liquids without difficulty  -MP        User Key  (r) = Recorded By, (t) = Taken By, (c) = Cosigned By    Initials Name Effective Dates    CA Leonela Ward, RN 06/16/16 -     MT Jessica Gonzalez, RN 06/16/16 -     MARYBETH Fournier, RN 06/16/16 -     TA Jd Lazar OT 03/14/16 -     TETE Perales, PT 03/14/16 -     KG Adryan Almaguer, ZAC 06/16/16 -            Occupational Therapy Education     Title: PT OT SLP Therapies (Done)     Topic: Occupational Therapy (Done)     Point: ADL training (Done)    Description: Instruct learner(s) on proper safety adaptation and remediation techniques during self care or transfers.   Instruct in proper use of assistive devices.    Learning Progress Summary    Learner Readiness Method Response Comment Documented by Status   Patient Acceptance E,TB,D,H VU,DU,NR ADL kit except leg   issued and training initiated; spinal/sitting precautions/log roll technique; body mechanics with bed mobility/fxl transfers. TA 04/22/17 0921 Done   Significant Other Acceptance E,TB,D,H VU,DU,NR ADL kit except leg  issued and training initiated; spinal/sitting precautions/log roll technique; body mechanics with bed mobility/fxl transfers. TA 04/22/17 0921 Done               Point: Precautions (Done)    Description: Instruct learner(s) on prescribed precautions during self-care and functional transfers.    Learning Progress Summary    Learner Readiness Method Response Comment Documented by Status   Patient Acceptance E,TB,D,H VU,DU,NR ADL kit except leg  issued and training initiated; spinal/sitting precautions/log roll technique; body mechanics with bed mobility/fxl transfers. TA 04/22/17 0921 Done   Significant Other Acceptance E,TB,D,H VU,DU,NR ADL kit except leg  issued and training initiated; spinal/sitting precautions/log roll technique; body mechanics with bed mobility/fxl transfers. TA 04/22/17 0921 Done               Point: Body mechanics (Done)    Description: Instruct learner(s) on proper positioning and spine alignment during self-care, functional mobility activities and/or exercises.    Learning Progress Summary    Learner Readiness Method Response Comment Documented by Status   Patient Acceptance E,TB,D,H VU,DU,NR ADL kit except leg  issued and training initiated; spinal/sitting precautions/log roll technique; body mechanics with bed mobility/fxl transfers. TA 04/22/17 0921 Done   Significant Other Acceptance E,TB,D,H VU,DU,NR ADL kit except leg  issued and training initiated; spinal/sitting precautions/log roll technique; body mechanics with bed mobility/fxl transfers. TA 04/22/17 0921 Done                      User Key     Initials Effective Dates Name Provider Type Discipline    TA 03/14/16 -  Jd Lazar OT Occupational Therapist OT                  OT  Recommendation and Plan  Anticipated Equipment Needs At Discharge: bedside commode, front wheeled walker (ADL kit except leg  issued, training initiated)  Anticipated Discharge Disposition: home with assist  Planned Therapy Interventions: adaptive equipment training, ADL retraining, balance training, bed mobility training, strengthening, transfer training  Therapy Frequency: daily (Per priority policy)  Plan of Care Review  Plan Of Care Reviewed With: patient, spouse  Outcome Summary/Follow up Plan: Pt presents with fxl decline from PLOF, deficits in ADL performance, fxl mobility, occupational endurance; will benefit from skilled OT services to address deficits, facilitate increased fxl I, safe transition to home with assist.          OT Goals       04/22/17 0923          Bed Mobility OT LTG    Bed Mobility OT LTG, Date Established 04/22/17  -TA      Bed Mobility OT LTG, Time to Achieve 1 wk  -TA      Bed Mobility OT LTG, Activity Type all bed mobility  -TA      Bed Mobility OT LTG, [place] Level conditional independence  -TA      Bed Mobility OT LTG, Outcome goal ongoing  -TA      Patient Education OT LTG    Patient Education OT LTG, Date Established 04/22/17  -TA      Patient Education OT LTG, Time to Achieve 1 wk  -TA      Patient Education OT LTG, Education Type precautions per surgeon;posture/body mechanics;adaptive equipment mgmt  -TA      Patient Education OT LTG, Education Understanding verbalizes understanding;demonstrates adequately  -TA      Patient Education OT LTG Outcome goal ongoing  -TA      LB Dressing OT LTG    LB Dressing Goal OT LTG, Date Established 04/22/17  -TA      LB Dressing Goal OT LTG, Time to Achieve 1 wk  -TA      LB Dressing Goal OT LTG, [place] Level conditional independence  -TA      LB Dressing Goal OT LTG, Adaptive Equipment --   AE  -TA      LB Dressing Goal OT LTG, Outcome goal ongoing  -TA        User Key  (r) = Recorded By, (t) = Taken By, (c) = Cosigned By     Initials Name Provider Type    LINDSAY Lazar OT Occupational Therapist                Outcome Measures       04/22/17 0819          How much help from another is currently needed...    Putting on and taking off regular lower body clothing? 3  -TA      Bathing (including washing, rinsing, and drying) 3  -TA      Toileting (which includes using toilet bed pan or urinal) 3  -TA      Putting on and taking off regular upper body clothing 4  -TA      Taking care of personal grooming (such as brushing teeth) 3  -TA      Eating meals 4  -TA      Score 20  -TA      Functional Assessment    Outcome Measure Options AM-PAC 6 Clicks Daily Activity (OT)  -TA        User Key  (r) = Recorded By, (t) = Taken By, (c) = Cosigned By    Initials Name Provider Type    LINDSAY Lazar OT Occupational Therapist          Time Calculation:   OT Start Time: 0819 (ttc 8 minutes)    Therapy Charges for Today     Code Description Service Date Service Provider Modifiers Qty    [id number]  OT SELFCARE CURRENT 4/22/2017 DEANNE Mann, CJ 1    [id number]  OT SELFCARE PROJECTED 4/22/2017 Jd Lazar OT GO, CI 1    [id number]  OT EVAL MOD COMPLEXITY 4 4/22/2017 Jd Lazar OT GO 1    [id number]  OT THERAPEUTIC ACT EA 15 MIN 4/22/2017 Jd Lazar OT GO 1          OT G-codes  OT Professional Judgement Used?: Yes  Functional Limitation: Self care  Self Care Current Status (): At least 20 percent but less than 40 percent impaired, limited or restricted  Self Care Goal Status (): At least 1 percent but less than 20 percent impaired, limited or restricted    Jd Lazar OT  4/22/2017

## 2017-04-22 NOTE — THERAPY DISCHARGE NOTE
Acute Care - Physical Therapy Initial Eval/Discharge  [facility]     Patient Name: Grant Ann  : 1955  MRN: [id number]  Today's Date: 2017   Onset of Illness/Injury or Date of Surgery Date: 17  Date of Referral to PT: 17  Referring Physician: MD Kwame      Admit Date: 2017    Visit Dx:    ICD-10-CM ICD-9-CM   1. Impaired functional mobility, balance, gait, and endurance Z74.09 V49.89   2. Lumbar herniated disc M51.26 722.10   3. Impaired mobility and ADLs Z74.09 799.89     Patient Active Problem List   Diagnosis   • Lumbar herniated disc     Past Medical History:   Diagnosis Date   • Arthritis    • Depression    • Diabetes mellitus     DX. , FSBS 1 X MONTH   • Disease of thyroid gland    • Heartburn    • High cholesterol    • Low back pain    • Recurrent boils    • Wears glasses    • Wears partial dentures      Past Surgical History:   Procedure Laterality Date   • KNEE ARTHROSCOPY W/ MENISCAL REPAIR Right 2016          PT ASSESSMENT (last 72 hours)      PT Evaluation       17 0820 17 0819    Rehab Evaluation    Document Type evaluation  -MB evaluation  -TA    Subjective Information agree to therapy;complains of;pain  -MB agree to therapy;complains of;pain  -TA    Patient Effort, Rehab Treatment good  -MB good  -TA    Symptoms Noted During/After Treatment  none  -TA    General Information    Patient Profile Review yes  -MB yes  -TA    Onset of Illness/Injury or Date of Surgery Date 17  -MB 17  -TA    Referring Physician MD Kwame  -MB Dr Puente  -TA    General Observations Pt. supine w/ IV R UE, on 2 L O2 NC.  Nsg consent for PT.  Pt's wife present at bedside.  -MB Pt supine, IV intact L hand, PCA, 2L O2 per NC; wife present in room.  -TA    Pertinent History Of Current Problem Pt.adm for surgical management of back and L LE pain and dysfunction w/ L3-L4 extreme lateral disc protusion.  Pt. s/p L3-L4 discectomy .  Pt. w/ h/o work-related  injury 6/16 and R knee sx.  -MB Pt admitted with h/o back pain, lumbar herniated discs, now s/p analy-lami/foramenotomy L3-L4, Left  -TA    Precautions/Limitations fall precautions;spinal precautions  -MB fall precautions;spinal precautions  -TA    Prior Level of Function independent:;all household mobility;community mobility;gait;transfer;bed mobility;wound care;driving;using stairs  -MB independent:;all household mobility;gait;transfer;bed mobility;ADL's   progressively worsening pain with ambulation, LB ADLs  -TA    Equipment Currently Used at Home cane, straight;crutches, axillary  -MB crutches;cane, straight  -TA    Plans/Goals Discussed With patient;spouse/S.O.;agreed upon  -MB patient;spouse/S.O.;agreed upon  -TA    Risks Reviewed patient:;spouse/S.O.:;LOB;dizziness;increased discomfort;change in vital signs;increased drainage  -MB patient:;spouse/S.O.:;LOB;nausea/vomiting;dizziness;increased discomfort;change in vital signs;lines disloged  -TA    Benefits Reviewed patient:;spouse/S.O.:;improve function;increase independence;increase balance;decrease pain;decrease risk of DVT  -MB patient:;spouse/S.O.:;improve function;increase independence;increase strength;increase balance;decrease pain;increase knowledge  -TA    Barriers to Rehab none identified  -MB none identified  -TA    Living Environment    Lives With spouse  -MB spouse  -TA    Living Arrangements house  -MB house  -TA    Home Accessibility stairs to enter home  -MB bed and bath on same level  -TA    Number of Stairs to Enter Home 2  -MB     Clinical Impression    Date of Referral to PT 04/21/17  -MB     PT Diagnosis L3-L4 discectomy  -MB     Functional Level At Time Of Evaluation Pt. required CGA for safe mobility.  -MB     Patient/Family Goals Statement Return to home and PLOF.  -MB     Criteria for Skilled Therapeutic Interventions Met yes;treatment indicated  -MB     Rehab Potential good, to achieve stated therapy goals  -MB     Vital Signs    Pre  Systolic BP Rehab --   VSS.  Nsg cleared for tx.  -MB --   Nurse cleared pt for tx, vitals stable.  -TA    Pre Patient Position Supine  -MB Supine  -TA    Intra Patient Position Standing  -MB Standing  -TA    Post Patient Position Supine  -MB Supine  -TA    Pain Assessment    Pain Assessment 0-10  -MB 0-10  -TA    Pain Score 6  -MB 6  -TA    Post Pain Score 6  -MB 6  -TA    Pain Type Acute pain;Surgical pain  -MB Surgical pain  -TA    Pain Location Back  -MB Back  -TA    Pain Orientation  Lower  -TA    Pain Intervention(s) Medication (See MAR);Repositioned;Ambulation/increased activity   Pt. premedicated.  -MB Medication (See MAR);Repositioned;Ambulation/increased activity  -TA    Response to Interventions  tolerated  -TA    Vision Assessment/Intervention    Visual Impairment WFL  -MB WFL  -TA    Cognitive Assessment/Intervention    Current Cognitive/Communication Assessment functional  -MB functional  -TA    Orientation Status oriented x 4  -MB oriented x 4  -TA    Follows Commands/Answers Questions 100% of the time;able to follow single-step instructions  -% of the time;able to follow multi-step instructions  -TA    Personal Safety WNL/WFL  -MB WNL/WFL  -TA    Personal Safety Interventions fall prevention program maintained;gait belt;nonskid shoes/slippers when out of bed  -MB fall prevention program maintained;gait belt;nonskid shoes/slippers when out of bed;supervised activity  -TA    ROM (Range of Motion)    General ROM no range of motion deficits identified  -MB no range of motion deficits identified  -TA    General ROM Detail  BUEs  -TA    MMT (Manual Muscle Testing)    General MMT Assessment lower extremity strength deficits identified  -MB no strength deficits identified  -TA    General MMT Assessment Detail --   B LE not formally tested d/t sx.  Appear WFL.  -MB BUE formal testing deferred 2/2 spinal px's; grossly WFLs  -TA    Bed Mobility, Assessment/Treatment    Bed Mobility, Assistive Device head  of bed elevated  -MB     Bed Mobility, Roll Left, [place] supervision required;verbal cues required   log roll technique  -MB supervision required;verbal cues required  -TA    Bed Mobility, Roll Right, [place] independent  -MB independent  -TA    Bed Mob, Sidelying to Sit, [place] supervision required;verbal cues required  -MB supervision required;verbal cues required  -TA    Bed Mob, Sit to Sidelying, [place] supervision required;verbal cues required  -MB supervision required;verbal cues required  -TA    Bed Mobility, Impairments ROM decreased;pain  -MB ROM decreased;pain  -TA    Bed Mobility, Comment VCs for log roll technique  -MB VCs for for log roll technique   -TA    Transfer Assessment/Treatment    Transfers, Sit-Stand [place] supervision required;verbal cues required  -MB supervision required;verbal cues required  -TA    Transfers, Stand-Sit [place] supervision required;verbal cues required  -MB supervision required;verbal cues required  -TA    Transfers, Sit-Stand-Sit, Assist Device rolling walker  -MB rolling walker  -TA    Transfer, Safety Issues --   None noted.  -MB     Transfer, Impairments pain;ROM decreased  -MB pain  -TA    Transfer, Comment VCs for safe hand placement and spinal precautions.  -MB     Gait Assessment/Treatment    Gait, [place] Level stand by assist  -MB     Gait, Assistive Device rolling walker  -MB     Gait, Distance (Feet) 400  -MB     Gait, Gait Pattern Analysis swing-through gait  -MB     Gait, Safety Issues supplemental O2  -MB     Gait, Impairments pain;ROM decreased  -MB     Gait, Comment Pt. amb w/ step-through gait and normal josee and VCs for upright posture and to look ahead vs. looking down at ground.  -MB     Stairs Assessment/Treatment    Number of Stairs 3  -MB     Stairs, Handrail Location both sides  -MB     Stairs, [place] Level contact guard assist;verbal cues required  -MB     Stairs, Impairments ROM decreased;pain  -MB      Stairs, Comment Pt. amb 3 steps x 2 w/ B handrails, CGA, and VCs for sequencing.  -MB Please refer to PT notes  -TA    Motor Skills/Interventions    Additional Documentation Balance Skills Training (Group)  -MB Balance Skills Training (Group);Fine Motor Coordination Training (Group)  -TA    Balance Skills Training    Training Strategies (Balance Skills)  LBD  -TA    Sitting-Level of Assistance  Independent  -TA    Sitting-Balance Support  Feet supported  -TA    Sitting-Balance Activities  Lateral lean;Reaching for objects;Reaching across midline;Trunk control activities  -TA    Standing-Level of Assistance Contact guard  -MB Contact guard  -TA    Static Standing Balance Support assistive device  -MB assistive device  -TA    Standing-Balance Activities Weight Shift R-L;Weight Shift A-P  -MB Weight Shift A-P;Weight Shift R-L;Reaching for objects  -TA    Gait Balance Support assistive device  -MB     Gait Balance Activities side-stepping;scanning environment R/L  -MB     Therapy Exercises    Bilateral Lower Extremities LAQ;ankle pumps/circles;hip ER  -MB     Fine Motor Coordination Training    Opposition  Right:;Left:;intact;other (comment)   [place] [id number] intact  -TA    Sensory Assessment/Intervention    Light Touch LLE;RLE  -MB LUE;RUE  -TA    LUE Light Touch  other (see comments)   Pt states intermittent tingling in [place] hands at times.  -TA    LLE Light Touch mild impairment  -MB     RLE Light Touch WNL  -MB     Positioning and Restraints    Pre-Treatment Position in bed  -MB in bed  -TA    Post Treatment Position bed  -MB bed  -TA    In Bed notified nsg;with OT  -MB supine;call light within reach;encouraged to call for assist;with family/caregiver;side rails up x2;legs elevated   IV intact L hand  -TA      04/22/17 0017 04/21/17 2200    Muscle Tone Assessment    Muscle Tone Assessment Bilateral Lower Extremities  -KG Bilateral Lower Extremities  -KG    Bilateral Lower Extremities Muscle Tone Assessment mildly  decreased tone  -KG mildly decreased tone  -KG      04/21/17 2045 04/21/17 0600    General Information    Equipment Currently Used at Home  cane, straight  -MT    Living Environment    Lives With  spouse  -MT    Living Arrangements  house  -MT    Home Accessibility  bed and bath on same level  -MT    Stair Railings at Home  none  -MT    Type of Financial/Environmental Concern  none  -MT    Transportation Available  car  -MT    Muscle Tone Assessment    Muscle Tone Assessment Bilateral Lower Extremities  -KG     Bilateral Lower Extremities Muscle Tone Assessment mildly decreased tone  -KG       04/20/17 1359       General Information    Equipment Currently Used at Home none  -MP     Living Environment    Lives With spouse  -MP     Living Arrangements house  -MP     Home Accessibility bed and bath on same level;no concerns  -MP     Stair Railings at Home none  -MP     Type of Financial/Environmental Concern none  -MP     Transportation Available car  -MP       User Key  (r) = Recorded By, (t) = Taken By, (c) = Cosigned By    Initials Name Provider Type    ZORA Gonzalez, RN Registered Nurse    MARYBETH Fournier, RN Registered Nurse    LINDSAY Lazar, OT Occupational Therapist    TETE Perales, PT Physical Therapist    MANASA Almaguer, ZAC Registered Nurse          Physical Therapy Education     Title: PT OT SLP Therapies (Done)     Topic: Physical Therapy (Done)     Point: Mobility training (Done)    Learning Progress Summary    Learner Readiness Method Response Comment Documented by Status   Patient Acceptance JEANNINE ECKERT H VU, DU MB 04/22/17 0927 Done   Family Acceptance JEANNINE ECKERT H VU, DU MB 04/22/17 0927 Done               Point: Home exercise program (Done)    Learning Progress Summary    Learner Readiness Method Response Comment Documented by Status   Patient Acceptance JEANNINE ECKERT H VU, DU MB 04/22/17 0927 Done   Family Acceptance JEANNINE ECKERT H VU, DU MB 04/22/17 0927 Done               Point: Body mechanics (Done)     Learning Progress Summary    Learner Readiness Method Response Comment Documented by Status   Patient Acceptance E,D,H MILADYDARIUS EPSTEIN 04/22/17 0927 Done   Family Acceptance E,D,H MILADYDARIUS EPSTEIN 04/22/17 0927 Done               Point: Precautions (Done)    Learning Progress Summary    Learner Readiness Method Response Comment Documented by Status   Patient Acceptance E,D,H MILADYDARIUS EPSTEIN 04/22/17 0927 Done   Family Acceptance E,D,H MILADYDARIUS EPSTEIN 04/22/17 0927 Done                      User Key     Initials Effective Dates Name Provider Type Discipline    MB 03/14/16 -  Jessica Perales, PT Physical Therapist PT                PT Recommendation and Plan  Anticipated Equipment Needs At Discharge: front wheeled walker, bedside commode  Anticipated Discharge Disposition: home with assist  Planned Therapy Interventions: balance training, gait training, patient/family education  PT Frequency: evaluation only  Plan of Care Review  Plan Of Care Reviewed With: patient, spouse  Progress: improving  Outcome Summary/Follow up Plan: PT eval completed.  Pt s/p L3-L4 discectomy. Pt. amb in hallway 400 ft w/ RW and SBA, and negotiated 3 steps w/ handrail w/ CGA.  Provided patient/CG education regarding home safety, spinal precautions, and issued handout for HEP.  Anticipate D/C to home w/ wife's assist later today.          IP PT Goals       04/22/17 0928          Gait Training PT STG    Gait Training Goal PT STG, Date Established 04/22/17  -MB      Gait Training Goal PT STG, Time to Achieve 1 day  -MB      Gait Training Goal PT STG, [place] Level supervision required  -MB      Gait Training Goal PT STG, Assist Device walker, rolling  -MB      Gait Training Goal PT STG, Distance to Achieve 350  -MB      Gait Training Goal PT STG, Outcome goal met  -MB      Stair Training PT STG    Stair Training Goal PT STG, Date Established 04/22/17  -MB      Stair Training Goal PT STG, Time to Achieve 1 day  -MB      Stair Training Goal PT STG, Number of  Steps 2  -MB      Stair Training Goal PT STG, [place] Level contact guard assist  -MB      Stair Training Goal PT STG, Assist Device 2 handrails  -MB      Stair Training Goal PT STG, Outcome goal met  -MB        User Key  (r) = Recorded By, (t) = Taken By, (c) = Cosigned By    Initials Name Provider Type    TETE Perales, PT Physical Therapist                Outcome Measures       04/22/17 0820 04/22/17 0819       How much help from another person do you currently need...    Turning from your back to your side while in flat bed without using bedrails? 4  -MB      Moving from lying on back to sitting on the side of a flat bed without bedrails? 4  -MB      Moving to and from a bed to a chair (including a wheelchair)? 3  -MB      Standing up from a chair using your arms (e.g., wheelchair, bedside chair)? 3  -MB      Climbing 3-5 steps with a railing? 3  -MB      To walk in hospital room? 3  -MB      AM-PAC 6 Clicks Score 20  -MB      How much help from another is currently needed...    Putting on and taking off regular lower body clothing?  3  -TA     Bathing (including washing, rinsing, and drying)  3  -TA     Toileting (which includes using toilet bed pan or urinal)  3  -TA     Putting on and taking off regular upper body clothing  4  -TA     Taking care of personal grooming (such as brushing teeth)  3  -TA     Eating meals  4  -TA     Score  20  -TA     Functional Assessment    Outcome Measure Options AM-PAC 6 Clicks Basic Mobility (PT)  -MB AM-PAC 6 Clicks Daily Activity (OT)  -TA       User Key  (r) = Recorded By, (t) = Taken By, (c) = Cosigned By    Initials Name Provider Type    LINDSAY Lazar OT Occupational Therapist    TETE Perales, PT Physical Therapist           Time Calculation:         PT Charges       04/22/17 0931          Time Calculation    Start Time 0820  -MB      PT Received On 04/22/17  -MB        User Key  (r) = Recorded By, (t) = Taken By, (c) = Cosigned By     Initials Name Provider Type    TETE Perales, PT Physical Therapist          Therapy Charges for Today     Code Description Service Date Service Provider Modifiers Qty    73592489970 HC PT MOBILITY CURRENT 4/22/2017 Jessica Perales, PT GP, CJ 1    92937644904 HC PT MOBILITY PROJECTED 4/22/2017 Jessica Perales, PT GP, CJ 1    [id number] HC PT MOBILITY DISCHARGE 4/22/2017 Jessica Perales, PT GP, CJ 1    [id number] HC PT EVAL MOD COMPLEXITY 4 4/22/2017 Jessica Perales, PT GP 1          PT G-Codes  PT Professional Judgement Used?: Yes  Outcome Measure Options: AM-PAC 6 Clicks Basic Mobility (PT)  Score: 20  Functional Limitation: Mobility: Walking and moving around  Mobility: Walking and Moving Around Current Status (): At least 20 percent but less than 40 percent impaired, limited or restricted  Mobility: Walking and Moving Around Goal Status (): At least 20 percent but less than 40 percent impaired, limited or restricted  Mobility: Walking and Moving Around Discharge Status (): At least 20 percent but less than 40 percent impaired, limited or restricted    PT Discharge Summary  Anticipated Discharge Disposition: home with assist  Reason for Discharge: Discharge from facility  Discharge Destination: Home with assist    Jessica Perales PT  4/22/2017

## 2017-04-22 NOTE — DISCHARGE PLACEMENT REQUEST
"Grant Ann (61 y.o. Male)     Date of Birth Social Security Number Address Home Phone MRN    1955  [address] [place] [address] [phone] [id number]    Cheondoism Marital Status          Sikh        Admission Date Admission Type Admitting Provider Attending Provider Department, Room/Bed    17 Elective Norberto Puente MD Brooks, William H, MD [facility] 5E, S533/1    Discharge Date Discharge Disposition Discharge Destination         Home or Self Care             Attending Provider: Norberto Puente MD     Allergies:  No Known Allergies    Isolation:  None   Infection:  None   Code Status:  FULL    Ht:  71\" (180.3 cm)   Wt:  221 lb (100 kg)    Admission Cmt:  None   Principal Problem:  None                Active Insurance as of 2017     Primary Coverage     Payor Plan Insurance Group Employer/Plan Group    WORKERS COMPENSATION [facility]      Payor Plan Address Payor Plan Phone Number Effective From Effective To    [address] [facility] [phone] [address]     [place] [address]       Subscriber Name Subscriber Birth Date Member ID       GRANT ANN [id number]                 Emergency Contacts      (Rel.) Home Phone Work Phone Mobile Phone    Keiry Ann (Spouse) [phone]        [address] [address]  Phone: [phone]  Fax:  Date Ordered: 2017         Patient: Grant Ann MRN: [id number]   [address] [place] [address] : 1955  SSN:    Phone: [phone] Sex: M     Weight: 221 lb (100 kg)         Ht Readings from Last 1 Encounters:   17 71\" (180.3 cm)         Walker (Order ID: [id number])   Diagnosis: Lumbar herniated disc (M51.26 [ICD-10-CM] 722.10 [ICD-9-CM])  Impaired functional mobility, balance, gait, and endurance (Z74.09 [ICD-10-CM] V49.89 [ICD-9-CM])  Impaired mobility and ADLs (Z74.09 [ICD-10-CM] " "799.89 [ICD-9-CM])   Quantity: 1     Equipment:  Walker Folding with Wheels  Length of Need (99 Months = Lifetime): 99 Months = Lifetime            Authorizing Provider:Norberto Puente MD  Order Entered By: Mirta Hawk RN 2017 9:42 AM     Electronically signed by: Norberto Puente MD (NPI: [id number]) 2017 9:42 AM          [address] [address]  Phone: [phone]  Fax:  Date Ordered: 2017         Patient: Grant Ann MRN: [id number]   [address] [place] [address] : 1955  SSN:    Phone: [phone] Sex: M     Weight: 221 lb (100 kg)         Ht Readings from Last 1 Encounters:   17 71\" (180.3 cm)         Commode Chair (Order ID: [id number])   Diagnosis: Lumbar herniated disc (M51.26 [ICD-10-CM] 722.10 [ICD-9-CM])  Impaired functional mobility, balance, gait, and endurance (Z74.09 [ICD-10-CM] V49.89 [ICD-9-CM])  Impaired mobility and ADLs (Z74.09 [ICD-10-CM] 799.89 [ICD-9-CM])   Quantity: 1     Equipment:  Bedside Commode Chair w/Fixed Arms  Length of Need (99 Months = Lifetime): 99 Months = Lifetime            Authorizing Provider:Norberto Puente MD  Order Entered By: Mirta Hawk RN 2017 9:43 AM     Electronically signed by: Norberto Puente MD (NPI: [id number]) 2017 9:43 AM              Insurance Information                WORKERS COMPENSATION/[place] Phone: [phone]    Subscriber: Grant Ann Subscriber#: [id number]    Group#:  Precert#:              History & Physical      Norberto Puente MD at 4/10/2017  7:47 AM          Grant Ann had his diagnostic studies today which included myelogram, post myelogram CT scan and EMG/NCV.     The EMG and NCV is normal. The CT scan and myelogram show the presence of disc herniation at L3-L4 deviating toward the left side. This provides clinical correlation to the symptom complex that he has at this time.     HISTORY OF PRESENT " "ILLNESS: This is a 61-year-old who sustained a work-related injury 6/16 with the onset of pain in his back range to his left lower extremity. This is been refractory to physical therapy of several weeks. He is had his right knee operated on. He is had a lumbar MRI. I \"think\" it shows an extreme lateral disc protrusion L3/4 deviating toward left side. It is been interpreted, however is showing disc degeneration without protrusion.         The diagnostic studies confirmed the presence of disc herniation which is consistent with the MRI of findings of my interpretation. I have offered surgical intervention which would include excision of a herniated disc at L3 4 on the left side. I cannot offer a 100% assurance that his symptoms will be sufficiently alleviated to [place] to return to unrestricted duty. Nevertheless, in my opinion, there is little else to offer him other than him \" living with it\".     He will take this under consideration and call our office when he is made his decision. He will be returned to the care of his primary care physician    Chief Complaint   Patient presents with   • Back Pain       sharp pains, worse when sitting, standing walking for long periods   • Numbness       in left leg         HISTORY OF PRESENT ILLNESS: This is a 61-year-old who sustained a work-related injury 6/16 with the onset of pain in his back range to his left lower extremity. This is been refractory to physical therapy of several weeks. He is had his right knee operated on. He is had a lumbar MRI and is referred for neurosurgical consultation.       Medical History         Past Medical History   Diagnosis Date   • Arthritis     • Diabetes mellitus     • Low back pain               Surgical History          Past Surgical History   Procedure Laterality Date   • Knee arthroscopy w/ meniscal repair                      Family History   Problem Relation Age of Onset   • No Known Problems Mother     • No Known Problems Father   "          Social History    Social History            Social History   • Marital status:        Spouse name: N/A   • Number of children: N/A   • Years of education: N/A          Occupational History   • Not on file.           Social History Main Topics   • Smoking status: Never Smoker   • Smokeless tobacco: Not on file   • Alcohol use No   • Drug use: Defer   • Sexual activity: Defer           Other Topics Concern   • Not on file          Social History Narrative   • No narrative on file            No Known Allergies        Current Outpatient Prescriptions:   • Canagliflozin (INVOKANA) 100 MG tablet, Take by mouth., Disp: , Rfl:   • citalopram (CeleXA) 20 MG tablet, Take 20 mg by mouth Daily., Disp: , Rfl:   • glyBURIDE-metFORMIN (GLUCOVANCE) 5-500 MG per tablet, , Disp: , Rfl:   • HYDROcodone-acetaminophen (NORCO) 7.5-325 MG per tablet, Take 1 tablet by mouth Every 6 (Six) Hours As Needed for moderate pain (4-6)., Disp: , Rfl:   • HYDROcodone-acetaminophen (VICODIN) 5-500 MG per tablet, Take 1 tablet by mouth Every 6 (Six) Hours As Needed for moderate pain (4-6)., Disp: , Rfl:   • indomethacin (INDOCIN) 50 MG capsule, Take 50 mg by mouth 3 (Three) Times a Day As Needed for mild pain (1-3)., Disp: , Rfl:   • levothyroxine (SYNTHROID, LEVOTHROID) 50 MCG tablet, Take 50 mcg by mouth Daily., Disp: , Rfl:   • metaxalone (SKELAXIN) 800 MG tablet, Take 800 mg by mouth 3 (Three) Times a Day As Needed for muscle spasms., Disp: , Rfl:   • oxyCODONE-acetaminophen (PERCOCET) 7.5-325 MG per tablet, TK 1 T PO Q 4 H PRN P., Disp: , Rfl: 0  • pioglitazone (ACTOS) 30 MG tablet, , Disp: , Rfl:   • simvastatin (ZOCOR) 40 MG tablet, , Disp: , Rfl:      Review of Systems   Musculoskeletal: Positive for back pain and myalgias.   Neurological: Positive for numbness.   All other systems reviewed and are negative.        Neurological Examination:      Vitals        Vitals:     02/23/17 1435   BP: 144/81   BP Location: Right arm  "  Patient Position: Sitting   Cuff Size: Adult   Pulse: 69   Resp: 20   Temp: 97.2 °F (36.2 °C)   TempSrc: Oral   SpO2: 95%   Weight: 224 lb (102 kg)   Height: 71\" (180.3 cm)            Mental status/speech: The patient is alert and oriented. Speech is clear without aphysia or dysarthria. No overt cognitive deficits.     Cranial nerve examination:     Olfaction: Smell is intact.  Vision: Vision is intact without visual field abnormalities. Funduscopic examination is normal. No pupillary irregularity.  Ocular motor examination: The extraocular muscles are intact. There is no diplopia. The pupil is round and reactive to both light and accommodation. There is no nystagmus.  Facial movement/sensation: There is no facial weakness. Sensation is intact in the first, second, and third divisions of the trigeminal nerve. The corneal reflex is intact.  Auditory: Hearing is intact to finger rub bilaterally.  Cranial nerves IX, X, XI, XII: Phonation is normal. No dysphagia. Tongue is protruded in the midline without atrophy. The gag reflex is intact. Shoulder shrug is normal.     Musculoligamentous ligamentous examination: He has a brace on his right knee. He has decreased range of motion. He has a mildly positive straight leg raising on the left. The left patellar reflex is diminished as is the right.     Medical Decision Making:     Diagnostic Data Set: He is had a lumbar MRI. I \"think\" it shows an extreme lateral disc protrusion L3/4 deviating toward left side. It is been interpreted, however is showing disc degeneration without protrusion.     Assessment: Possible disc herniation L3/4, extreme lateral       Recommendations: I've recommended myelography, post-Myelogram CT scan and EMG and NCV both lower extremities. He has subjective numbness in the distribution of the peroneal nerve on the right side where he has had recent knee operation. He also needs to have a EMG and NCV on the left leg.          Electronically signed by " "Norberto Puente MD at 4/10/2017  7:48 AM      CAREY Rhoades at 4/21/2017  6:23 AM          Pre-Op H&P (See Recent Office Note Attached)    Chief complaint: Low back pain into LLE    HPI:      Patient is a 61 y.o. male who presents with L3-4 extreme lateral disc protrusion deviating toward left side and here today for left lumbar discectomy, far lateral L3-4    Review of Systems:  General ROS:  no fever, chills, rashes, No change since last office visit.  Skin area on right mid back with some yellow drainage.    Cardiovascular ROS: no chest pain or dyspnea on exertion  Respiratory ROS: no cough, shortness of breath, or wheezing    Immunization History:   Influenza:  no  Pneumococcal:  no  Tetanus:  unknown    Vital Signs:  /81 (BP Location: Right arm, Patient Position: Lying)  Pulse 68  Temp 97.3 °F (36.3 °C) (Temporal Artery )   Resp 16  Ht 71\" (180.3 cm)  Wt 221 lb (100 kg)  SpO2 97%  BMI 30.82 kg/m2    Physical Exam:    CV:  S1S2 regular rate and rhythm, no murmur               Resp:  Clear to auscultation; respirations regular, even and unlabored               Skin:  Small, dime size lesion with serous/nonodorous drainage, no surrounding erythema or edema    Results Review:    I reviewed the patient's new clinical results.    Cancer Staging (if applicable)  Cancer Patient: __ yes x__no __unknown; If yes, clinical stage T:__ N:__M:__, stage group or __N/A    Assessment/Plan:    L3-4 extreme lateral disc protrusion deviating toward left side and here today for left lumbar discectomy, far lateral L3-4    CAREY Baugh  4/21/2017 6:23 AM         Electronically signed by Norberto Puente MD at 4/21/2017  6:28 AM    Source Note             Grant Marissa had his diagnostic studies today which included myelogram, post myelogram CT scan and EMG/NCV.     The EMG and NCV is normal. The CT scan and myelogram show the presence of disc herniation at L3-L4 deviating toward the left side. This " "provides clinical correlation to the symptom complex that he has at this time.     HISTORY OF PRESENT ILLNESS: This is a 61-year-old who sustained a work-related injury 6/16 with the onset of pain in his back range to his left lower extremity. This is been refractory to physical therapy of several weeks. He is had his right knee operated on. He is had a lumbar MRI. I \"think\" it shows an extreme lateral disc protrusion L3/4 deviating toward left side. It is been interpreted, however is showing disc degeneration without protrusion.         The diagnostic studies confirmed the presence of disc herniation which is consistent with the MRI of findings of my interpretation. I have offered surgical intervention which would include excision of a herniated disc at L3 4 on the left side. I cannot offer a 100% assurance that his symptoms will be sufficiently alleviated to [place] to return to unrestricted duty. Nevertheless, in my opinion, there is little else to offer him other than him \" living with it\".     He will take this under consideration and call our office when he is made his decision. He will be returned to the care of his primary care physician    Chief Complaint   Patient presents with   • Back Pain       sharp pains, worse when sitting, standing walking for long periods   • Numbness       in left leg         HISTORY OF PRESENT ILLNESS: This is a 61-year-old who sustained a work-related injury 6/16 with the onset of pain in his back range to his left lower extremity. This is been refractory to physical therapy of several weeks. He is had his right knee operated on. He is had a lumbar MRI and is referred for neurosurgical consultation.       Medical History         Past Medical History   Diagnosis Date   • Arthritis     • Diabetes mellitus     • Low back pain               Surgical History          Past Surgical History   Procedure Laterality Date   • Knee arthroscopy w/ meniscal repair                      Family " History   Problem Relation Age of Onset   • No Known Problems Mother     • No Known Problems Father            Social History    Social History            Social History   • Marital status:        Spouse name: N/A   • Number of children: N/A   • Years of education: N/A          Occupational History   • Not on file.           Social History Main Topics   • Smoking status: Never Smoker   • Smokeless tobacco: Not on file   • Alcohol use No   • Drug use: Defer   • Sexual activity: Defer           Other Topics Concern   • Not on file          Social History Narrative   • No narrative on file            No Known Allergies        Current Outpatient Prescriptions:   • Canagliflozin (INVOKANA) 100 MG tablet, Take by mouth., Disp: , Rfl:   • citalopram (CeleXA) 20 MG tablet, Take 20 mg by mouth Daily., Disp: , Rfl:   • glyBURIDE-metFORMIN (GLUCOVANCE) 5-500 MG per tablet, , Disp: , Rfl:   • HYDROcodone-acetaminophen (NORCO) 7.5-325 MG per tablet, Take 1 tablet by mouth Every 6 (Six) Hours As Needed for moderate pain (4-6)., Disp: , Rfl:   • HYDROcodone-acetaminophen (VICODIN) 5-500 MG per tablet, Take 1 tablet by mouth Every 6 (Six) Hours As Needed for moderate pain (4-6)., Disp: , Rfl:   • indomethacin (INDOCIN) 50 MG capsule, Take 50 mg by mouth 3 (Three) Times a Day As Needed for mild pain (1-3)., Disp: , Rfl:   • levothyroxine (SYNTHROID, LEVOTHROID) 50 MCG tablet, Take 50 mcg by mouth Daily., Disp: , Rfl:   • metaxalone (SKELAXIN) 800 MG tablet, Take 800 mg by mouth 3 (Three) Times a Day As Needed for muscle spasms., Disp: , Rfl:   • oxyCODONE-acetaminophen (PERCOCET) 7.5-325 MG per tablet, TK 1 T PO Q 4 H PRN P., Disp: , Rfl: 0  • pioglitazone (ACTOS) 30 MG tablet, , Disp: , Rfl:   • simvastatin (ZOCOR) 40 MG tablet, , Disp: , Rfl:      Review of Systems   Musculoskeletal: Positive for back pain and myalgias.   Neurological: Positive for numbness.   All other systems reviewed and are negative.        Neurological  "Examination:      Vitals        Vitals:     02/23/17 1435   BP: 144/81   BP Location: Right arm   Patient Position: Sitting   Cuff Size: Adult   Pulse: 69   Resp: 20   Temp: 97.2 °F (36.2 °C)   TempSrc: Oral   SpO2: 95%   Weight: 224 lb (102 kg)   Height: 71\" (180.3 cm)            Mental status/speech: The patient is alert and oriented. Speech is clear without aphysia or dysarthria. No overt cognitive deficits.     Cranial nerve examination:     Olfaction: Smell is intact.  Vision: Vision is intact without visual field abnormalities. Funduscopic examination is normal. No pupillary irregularity.  Ocular motor examination: The extraocular muscles are intact. There is no diplopia. The pupil is round and reactive to both light and accommodation. There is no nystagmus.  Facial movement/sensation: There is no facial weakness. Sensation is intact in the first, second, and third divisions of the trigeminal nerve. The corneal reflex is intact.  Auditory: Hearing is intact to finger rub bilaterally.  Cranial nerves IX, X, XI, XII: Phonation is normal. No dysphagia. Tongue is protruded in the midline without atrophy. The gag reflex is intact. Shoulder shrug is normal.     Musculoligamentous ligamentous examination: He has a brace on his right knee. He has decreased range of motion. He has a mildly positive straight leg raising on the left. The left patellar reflex is diminished as is the right.     Medical Decision Making:     Diagnostic Data Set: He is had a lumbar MRI. I \"think\" it shows an extreme lateral disc protrusion L3/4 deviating toward left side. It is been interpreted, however is showing disc degeneration without protrusion.     Assessment: Possible disc herniation L3/4, extreme lateral       Recommendations: I've recommended myelography, post-Myelogram CT scan and EMG and NCV both lower extremities. He has subjective numbness in the distribution of the peroneal nerve on the right side where he has had recent knee " operation. He also needs to have a EMG and NCV on the left leg.           Electronically signed by Norberto Puente MD at 4/10/2017  7:48 AM

## 2017-04-22 NOTE — DISCHARGE SUMMARY
Date of Discharge:  4/22/2017    Kenn Crowder, DO  @CARE TEAM@    Discharge Diagnosis: Herniated intervertebral disc L3-L4, left    Procedures Performed  Procedure(s):  LEFT LUMBAR FORAMINOTOMY DISCECTOMY FAR LATERAL L3-4       Summary of Hospitalization: This is a 61-year-old male admitted to this time for surgical intervention of severe back and right leg pain secondary to nerve root entrapment at L3-L4 manifest by lateral disc protrusion associated with facet hypertrophy and lateral recess closure.    He was admitted to the hospital and underwent nerve root decompression with good relief.  Surgical findings included degenerative osteoarthritic changes with facet hypertrophy compressing the L4 nerve root as well as a lateral disc protrusion.    He was discharged on the following day.  He will be followed in the [place] neurosurgery clinic in 6 weeks.    He is unable to work until further notice.    Condition on Discharge:  Satisfactory Discharge Disposition  Home or Self Care    Discharge Medications   Grant Ann   Home Medication Instructions MARILEE:[id number]    Printed on:04/22/17 2568   Medication Information                      carisoprodol (SOMA) 350 MG tablet  Take 1 tablet by mouth 4 (Four) Times a Day As Needed for Muscle Spasms.             citalopram (CeleXA) 20 MG tablet  Take 20 mg by mouth Daily.             glyBURIDE-metFORMIN (GLUCOVANCE) 5-500 MG per tablet  Take 2 tablets by mouth 2 (Two) Times a Day With Meals.             levothyroxine (SYNTHROID, LEVOTHROID) 50 MCG tablet  Take 75 mcg by mouth Daily.             meloxicam (MOBIC) 15 MG tablet  Take 15 mg by mouth Daily.             oxyCODONE-acetaminophen (PERCOCET)  MG per tablet  Take 1 tablet by mouth Every 6 (Six) Hours As Needed for Moderate Pain (4-6).             pioglitazone (ACTOS) 30 MG tablet  30 mg Daily.             simvastatin (ZOCOR) 40 MG tablet  40 mg Every Night.                   Follow-up Appointments  No  future appointments.  Additional Instructions for the Follow-ups that You Need to Schedule     Discharge Follow-Up With Specified Provider    As directed    To:  [facility]   Follow Up:  6 Weeks   Follow Up Details:  Follow-up in [facility] 6 weeks with Kwame       Referral to Home Health    As directed    Please check incision 2 days a week for 2 weeks.  He is diabetic and I'm concerned about wound healing.   Face to Face Visit Date:  4/22/2017   Follow-up Provider for Plan of Care?:  I will be treating the patient on an ongoing basis.  Please send me the Plan of Care for signature.   Follow-up Provider:  CURTIS ESTEBAN   Reason/Clinical Findings:  Spinal surgery   Describe mobility limitations that make leaving home difficult:  Spinal surgery   Nursing/Therapeutic Services Requested:  Skilled Nursing   Skilled nursing orders:  Wound care dressing/changes   Frequency:  1 Week 1                    Curtis Esteban MD  04/22/17  8:17 AM

## 2017-04-22 NOTE — PLAN OF CARE
Problem: Patient Care Overview (Adult)  Goal: Plan of Care Review  Outcome: Ongoing (interventions implemented as appropriate)    04/22/17 0923   Coping/Psychosocial Response Interventions   Plan Of Care Reviewed With patient;spouse   Outcome Evaluation   Outcome Summary/Follow up Plan Pt presents with fxl decline from PLOF, deficits in ADL performance, fxl mobility, occupational endurance; will benefit from skilled OT services to address deficits, facilitate increased fxl I, safe transition to home with assist.         Problem: Inpatient Occupational Therapy  Goal: Bed Mobility Goal LTG- OT  Outcome: Ongoing (interventions implemented as appropriate)    04/22/17 0923   Bed Mobility OT LTG   Bed Mobility OT LTG, Date Established 04/22/17   Bed Mobility OT LTG, Time to Achieve 1 wk   Bed Mobility OT LTG, Activity Type all bed mobility   Bed Mobility OT LTG, [place] Level conditional independence   Bed Mobility OT LTG, Outcome goal ongoing       Goal: Patient Education Goal LTG- OT  Outcome: Ongoing (interventions implemented as appropriate)    04/22/17 0923   Patient Education OT LTG   Patient Education OT LTG, Date Established 04/22/17   Patient Education OT LTG, Time to Achieve 1 wk   Patient Education OT LTG, Education Type precautions per surgeon;posture/body mechanics;adaptive equipment mgmt   Patient Education OT LTG, Education Understanding verbalizes understanding;demonstrates adequately   Patient Education OT LTG Outcome goal ongoing       Goal: LB Dressing Goal LTG- OT  Outcome: Ongoing (interventions implemented as appropriate)    04/22/17 0923   LB Dressing OT LTG   LB Dressing Goal OT LTG, Date Established 04/22/17   LB Dressing Goal OT LTG, Time to Achieve 1 wk   LB Dressing Goal OT LTG, [place] Level conditional independence   LB Dressing Goal OT LTG, Adaptive Equipment (AE)   LB Dressing Goal OT LTG, Outcome goal ongoing

## 2017-04-22 NOTE — PLAN OF CARE
Problem: Fall Risk (Adult)  Goal: Absence of Falls  Outcome: Outcome(s) achieved Date Met:  04/22/17 04/22/17 0136   Fall Risk (Adult)   Absence of Falls achieves outcome

## 2017-04-22 NOTE — DISCHARGE INSTR - LAB
OUR  WILL FOLLOW-UP WITH YOU ON Monday REGARDING SETTING UP HOME HEALTH THROUGH [employer]    YOU WILL HAVE A WALKER AND BEDSIDE COMMODE DELIVERED TO YOUR ROOM PRIOR TO DISCHARGE.

## 2017-04-24 NOTE — THERAPY DISCHARGE NOTE
Acute Care - Occupational Therapy Discharge Summary  [facility]     Patient Name: Grant Ann  : 1955  MRN: [id number]    Today's Date: 2017  Onset of Illness/Injury or Date of Surgery Date: 17    Date of Referral to OT: 17  Referring Physician: MD Kwame      Admit Date: 2017        OT Recommendation and Plan    Visit Dx:    ICD-10-CM ICD-9-CM   1. Impaired functional mobility, balance, gait, and endurance Z74.09 V49.89   2. Lumbar herniated disc M51.26 722.10   3. Impaired mobility and ADLs Z74.09 799.89                     OT Goals       17 0923          Bed Mobility OT LTG    Bed Mobility OT LTG, Date Established 17  -TA      Bed Mobility OT LTG, Time to Achieve 1 wk  -TA      Bed Mobility OT LTG, Activity Type all bed mobility  -TA      Bed Mobility OT LTG, Alcorn Level conditional independence  -TA      Bed Mobility OT LTG, Outcome goal ongoing  -TA      Patient Education OT LTG    Patient Education OT LTG, Date Established 17  -TA      Patient Education OT LTG, Time to Achieve 1 wk  -TA      Patient Education OT LTG, Education Type precautions per surgeon;posture/body mechanics;adaptive equipment mgmt  -TA      Patient Education OT LTG, Education Understanding verbalizes understanding;demonstrates adequately  -TA      Patient Education OT LTG Outcome goal ongoing  -TA      LB Dressing OT LTG    LB Dressing Goal OT LTG, Date Established 17  -TA      LB Dressing Goal OT LTG, Time to Achieve 1 wk  -TA      LB Dressing Goal OT LTG, Alcorn Level conditional independence  -TA      LB Dressing Goal OT LTG, Adaptive Equipment --   AE  -TA      LB Dressing Goal OT LTG, Outcome goal ongoing  -TA        User Key  (r) = Recorded By, (t) = Taken By, (c) = Cosigned By    Initials Name Provider Type    LINDSAY Lazar OT Occupational Therapist                Outcome Measures       17 0817 08       How much help from another  person do you currently need...    Turning from your back to your side while in flat bed without using bedrails? 4  -MB      Moving from lying on back to sitting on the side of a flat bed without bedrails? 4  -MB      Moving to and from a bed to a chair (including a wheelchair)? 3  -MB      Standing up from a chair using your arms (e.g., wheelchair, bedside chair)? 3  -MB      Climbing 3-5 steps with a railing? 3  -MB      To walk in hospital room? 3  -MB      AM-PAC 6 Clicks Score 20  -MB      How much help from another is currently needed...    Putting on and taking off regular lower body clothing?  3  -TA     Bathing (including washing, rinsing, and drying)  3  -TA     Toileting (which includes using toilet bed pan or urinal)  3  -TA     Putting on and taking off regular upper body clothing  4  -TA     Taking care of personal grooming (such as brushing teeth)  3  -TA     Eating meals  4  -TA     Score  20  -TA     Functional Assessment    Outcome Measure Options AM-PAC 6 Clicks Basic Mobility (PT)  -MB AM-PAC 6 Clicks Daily Activity (OT)  -TA       User Key  (r) = Recorded By, (t) = Taken By, (c) = Cosigned By    Initials Name Provider Type    TA Jd Lazar, OT Occupational Therapist    TETE Perales, PT Physical Therapist              OT Discharge Summary  Reason for Discharge: Discharge from facility  Outcomes Achieved: Refer to plan of care for updates on goals achieved  Discharge Destination: Home      Katie Lou OT  4/24/2017

## 2017-04-24 NOTE — DISCHARGE PLACEMENT REQUEST
"Grant Ann (61 y.o. Male)   Sonja Kellerman RN Case Manager [phone]    Date of Birth Social Security Number Address Home Phone MRN    1955  [address] [place] [address] [phone] [id number]    Sabianist Marital Status          Anglican        Admission Date Admission Type Admitting Provider Attending Provider Department, Room/Bed    4/21/17 Elective Norberto Puente MD  [facility] 5E, S533/1    Discharge Date Discharge Disposition Discharge Destination        4/22/2017 Home or Self Care             Attending Provider: (none)    Allergies:  No Known Allergies    Isolation:  None   Infection:  None   Code Status:  Prior    Ht:  71\" (180.3 cm)   Wt:  221 lb (100 kg)    Admission Cmt:  None   Principal Problem:  None                Active Insurance as of 4/21/2017     Primary Coverage     Payor Plan Insurance Group Employer/Plan Group    WORKERS COMPENSATION [facility]      Payor Plan Address Payor Plan Phone Number Effective From Effective To    [address] [facility] [phone] 6/20/2016     [place] [address]       Subscriber Name Subscriber Birth Date Member ID       GRANT ANN [id number]                 Emergency Contacts      (Rel.) Home Phone Work Phone Mobile Phone    Keiry Ann (Spouse) [phone]               History & Physical      Norberto Puente MD at 4/10/2017  7:47 AM          Grant Ann had his diagnostic studies today which included myelogram, post myelogram CT scan and EMG/NCV.     The EMG and NCV is normal. The CT scan and myelogram show the presence of disc herniation at L3-L4 deviating toward the left side. This provides clinical correlation to the symptom complex that he has at this time.     HISTORY OF PRESENT ILLNESS: This is a 61-year-old who sustained a work-related injury 6/16 with the onset of pain in his back range to his left lower extremity. This is been refractory to physical " "therapy of several weeks. He is had his right knee operated on. He is had a lumbar MRI. I \"think\" it shows an extreme lateral disc protrusion L3/4 deviating toward left side. It is been interpreted, however is showing disc degeneration without protrusion.         The diagnostic studies confirmed the presence of disc herniation which is consistent with the MRI of findings of my interpretation. I have offered surgical intervention which would include excision of a herniated disc at L3 4 on the left side. I cannot offer a 100% assurance that his symptoms will be sufficiently alleviated to [place] to return to unrestricted duty. Nevertheless, in my opinion, there is little else to offer him other than him \" living with it\".     He will take this under consideration and call our office when he is made his decision. He will be returned to the care of his primary care physician    Chief Complaint   Patient presents with   • Back Pain       sharp pains, worse when sitting, standing walking for long periods   • Numbness       in left leg         HISTORY OF PRESENT ILLNESS: This is a 61-year-old who sustained a work-related injury 6/16 with the onset of pain in his back range to his left lower extremity. This is been refractory to physical therapy of several weeks. He is had his right knee operated on. He is had a lumbar MRI and is referred for neurosurgical consultation.       Medical History         Past Medical History   Diagnosis Date   • Arthritis     • Diabetes mellitus     • Low back pain               Surgical History          Past Surgical History   Procedure Laterality Date   • Knee arthroscopy w/ meniscal repair                      Family History   Problem Relation Age of Onset   • No Known Problems Mother     • No Known Problems Father            Social History    Social History            Social History   • Marital status:        Spouse name: N/A   • Number of children: N/A   • Years of education: N/A        " "  Occupational History   • Not on file.           Social History Main Topics   • Smoking status: Never Smoker   • Smokeless tobacco: Not on file   • Alcohol use No   • Drug use: Defer   • Sexual activity: Defer           Other Topics Concern   • Not on file          Social History Narrative   • No narrative on file            No Known Allergies        Current Outpatient Prescriptions:   • Canagliflozin (INVOKANA) 100 MG tablet, Take by mouth., Disp: , Rfl:   • citalopram (CeleXA) 20 MG tablet, Take 20 mg by mouth Daily., Disp: , Rfl:   • glyBURIDE-metFORMIN (GLUCOVANCE) 5-500 MG per tablet, , Disp: , Rfl:   • HYDROcodone-acetaminophen (NORCO) 7.5-325 MG per tablet, Take 1 tablet by mouth Every 6 (Six) Hours As Needed for moderate pain (4-6)., Disp: , Rfl:   • HYDROcodone-acetaminophen (VICODIN) 5-500 MG per tablet, Take 1 tablet by mouth Every 6 (Six) Hours As Needed for moderate pain (4-6)., Disp: , Rfl:   • indomethacin (INDOCIN) 50 MG capsule, Take 50 mg by mouth 3 (Three) Times a Day As Needed for mild pain (1-3)., Disp: , Rfl:   • levothyroxine (SYNTHROID, LEVOTHROID) 50 MCG tablet, Take 50 mcg by mouth Daily., Disp: , Rfl:   • metaxalone (SKELAXIN) 800 MG tablet, Take 800 mg by mouth 3 (Three) Times a Day As Needed for muscle spasms., Disp: , Rfl:   • oxyCODONE-acetaminophen (PERCOCET) 7.5-325 MG per tablet, TK 1 T PO Q 4 H PRN P., Disp: , Rfl: 0  • pioglitazone (ACTOS) 30 MG tablet, , Disp: , Rfl:   • simvastatin (ZOCOR) 40 MG tablet, , Disp: , Rfl:      Review of Systems   Musculoskeletal: Positive for back pain and myalgias.   Neurological: Positive for numbness.   All other systems reviewed and are negative.        Neurological Examination:      Vitals        Vitals:     02/23/17 1435   BP: 144/81   BP Location: Right arm   Patient Position: Sitting   Cuff Size: Adult   Pulse: 69   Resp: 20   Temp: 97.2 °F (36.2 °C)   TempSrc: Oral   SpO2: 95%   Weight: 224 lb (102 kg)   Height: 71\" (180.3 cm)    " "        Mental status/speech: The patient is alert and oriented. Speech is clear without aphysia or dysarthria. No overt cognitive deficits.     Cranial nerve examination:     Olfaction: Smell is intact.  Vision: Vision is intact without visual field abnormalities. Funduscopic examination is normal. No pupillary irregularity.  Ocular motor examination: The extraocular muscles are intact. There is no diplopia. The pupil is round and reactive to both light and accommodation. There is no nystagmus.  Facial movement/sensation: There is no facial weakness. Sensation is intact in the first, second, and third divisions of the trigeminal nerve. The corneal reflex is intact.  Auditory: Hearing is intact to finger rub bilaterally.  Cranial nerves IX, X, XI, XII: Phonation is normal. No dysphagia. Tongue is protruded in the midline without atrophy. The gag reflex is intact. Shoulder shrug is normal.     Musculoligamentous ligamentous examination: He has a brace on his right knee. He has decreased range of motion. He has a mildly positive straight leg raising on the left. The left patellar reflex is diminished as is the right.     Medical Decision Making:     Diagnostic Data Set: He is had a lumbar MRI. I \"think\" it shows an extreme lateral disc protrusion L3/4 deviating toward left side. It is been interpreted, however is showing disc degeneration without protrusion.     Assessment: Possible disc herniation L3/4, extreme lateral       Recommendations: I've recommended myelography, post-Myelogram CT scan and EMG and NCV both lower extremities. He has subjective numbness in the distribution of the peroneal nerve on the right side where he has had recent knee operation. He also needs to have a EMG and NCV on the left leg.          Electronically signed by Norberto Puente MD at 4/10/2017  7:48 AM      CAREY Rhoades at 4/21/2017  6:23 AM          Pre-Op H&P (See Recent Office Note Attached)    Chief complaint: Low back " "pain into LLE    HPI:      Patient is a 61 y.o. male who presents with L3-4 extreme lateral disc protrusion deviating toward left side and here today for left lumbar discectomy, far lateral L3-4    Review of Systems:  General ROS:  no fever, chills, rashes, No change since last office visit.  Skin area on right mid back with some yellow drainage.    Cardiovascular ROS: no chest pain or dyspnea on exertion  Respiratory ROS: no cough, shortness of breath, or wheezing    Immunization History:   Influenza:  no  Pneumococcal:  no  Tetanus:  unknown    Vital Signs:  /81 (BP Location: Right arm, Patient Position: Lying)  Pulse 68  Temp 97.3 °F (36.3 °C) (Temporal Artery )   Resp 16  Ht 71\" (180.3 cm)  Wt 221 lb (100 kg)  SpO2 97%  BMI 30.82 kg/m2    Physical Exam:    CV:  S1S2 regular rate and rhythm, no murmur               Resp:  Clear to auscultation; respirations regular, even and unlabored               Skin:  Small, dime size lesion with serous/nonodorous drainage, no surrounding erythema or edema    Results Review:    I reviewed the patient's new clinical results.    Cancer Staging (if applicable)  Cancer Patient: __ yes x__no __unknown; If yes, clinical stage T:__ N:__M:__, stage group or __N/A    Assessment/Plan:    L3-4 extreme lateral disc protrusion deviating toward left side and here today for left lumbar discectomy, far lateral L3-4    Deysi Olsen, APRN  4/21/2017 6:23 AM         Electronically signed by Norberto Puente MD at 4/21/2017  6:28 AM    Source Note             Grant Ann had his diagnostic studies today which included myelogram, post myelogram CT scan and EMG/NCV.     The EMG and NCV is normal. The CT scan and myelogram show the presence of disc herniation at L3-L4 deviating toward the left side. This provides clinical correlation to the symptom complex that he has at this time.     HISTORY OF PRESENT ILLNESS: This is a 61-year-old who sustained a work-related injury 6/16 " "with the onset of pain in his back range to his left lower extremity. This is been refractory to physical therapy of several weeks. He is had his right knee operated on. He is had a lumbar MRI. I \"think\" it shows an extreme lateral disc protrusion L3/4 deviating toward left side. It is been interpreted, however is showing disc degeneration without protrusion.         The diagnostic studies confirmed the presence of disc herniation which is consistent with the MRI of findings of my interpretation. I have offered surgical intervention which would include excision of a herniated disc at L3 4 on the left side. I cannot offer a 100% assurance that his symptoms will be sufficiently alleviated to [place] to return to unrestricted duty. Nevertheless, in my opinion, there is little else to offer him other than him \" living with it\".     He will take this under consideration and call our office when he is made his decision. He will be returned to the care of his primary care physician    Chief Complaint   Patient presents with   • Back Pain       sharp pains, worse when sitting, standing walking for long periods   • Numbness       in left leg         HISTORY OF PRESENT ILLNESS: This is a 61-year-old who sustained a work-related injury 6/16 with the onset of pain in his back range to his left lower extremity. This is been refractory to physical therapy of several weeks. He is had his right knee operated on. He is had a lumbar MRI and is referred for neurosurgical consultation.       Medical History         Past Medical History   Diagnosis Date   • Arthritis     • Diabetes mellitus     • Low back pain               Surgical History          Past Surgical History   Procedure Laterality Date   • Knee arthroscopy w/ meniscal repair                      Family History   Problem Relation Age of Onset   • No Known Problems Mother     • No Known Problems Father            Social History    Social History            Social History   • " Marital status:        Spouse name: N/A   • Number of children: N/A   • Years of education: N/A          Occupational History   • Not on file.           Social History Main Topics   • Smoking status: Never Smoker   • Smokeless tobacco: Not on file   • Alcohol use No   • Drug use: Defer   • Sexual activity: Defer           Other Topics Concern   • Not on file          Social History Narrative   • No narrative on file            No Known Allergies        Current Outpatient Prescriptions:   • Canagliflozin (INVOKANA) 100 MG tablet, Take by mouth., Disp: , Rfl:   • citalopram (CeleXA) 20 MG tablet, Take 20 mg by mouth Daily., Disp: , Rfl:   • glyBURIDE-metFORMIN (GLUCOVANCE) 5-500 MG per tablet, , Disp: , Rfl:   • HYDROcodone-acetaminophen (NORCO) 7.5-325 MG per tablet, Take 1 tablet by mouth Every 6 (Six) Hours As Needed for moderate pain (4-6)., Disp: , Rfl:   • HYDROcodone-acetaminophen (VICODIN) 5-500 MG per tablet, Take 1 tablet by mouth Every 6 (Six) Hours As Needed for moderate pain (4-6)., Disp: , Rfl:   • indomethacin (INDOCIN) 50 MG capsule, Take 50 mg by mouth 3 (Three) Times a Day As Needed for mild pain (1-3)., Disp: , Rfl:   • levothyroxine (SYNTHROID, LEVOTHROID) 50 MCG tablet, Take 50 mcg by mouth Daily., Disp: , Rfl:   • metaxalone (SKELAXIN) 800 MG tablet, Take 800 mg by mouth 3 (Three) Times a Day As Needed for muscle spasms., Disp: , Rfl:   • oxyCODONE-acetaminophen (PERCOCET) 7.5-325 MG per tablet, TK 1 T PO Q 4 H PRN P., Disp: , Rfl: 0  • pioglitazone (ACTOS) 30 MG tablet, , Disp: , Rfl:   • simvastatin (ZOCOR) 40 MG tablet, , Disp: , Rfl:      Review of Systems   Musculoskeletal: Positive for back pain and myalgias.   Neurological: Positive for numbness.   All other systems reviewed and are negative.        Neurological Examination:      Vitals        Vitals:     02/23/17 1435   BP: 144/81   BP Location: Right arm   Patient Position: Sitting   Cuff Size: Adult   Pulse: 69   Resp: 20   Temp:  "97.2 °F (36.2 °C)   TempSrc: Oral   SpO2: 95%   Weight: 224 lb (102 kg)   Height: 71\" (180.3 cm)            Mental status/speech: The patient is alert and oriented. Speech is clear without aphysia or dysarthria. No overt cognitive deficits.     Cranial nerve examination:     Olfaction: Smell is intact.  Vision: Vision is intact without visual field abnormalities. Funduscopic examination is normal. No pupillary irregularity.  Ocular motor examination: The extraocular muscles are intact. There is no diplopia. The pupil is round and reactive to both light and accommodation. There is no nystagmus.  Facial movement/sensation: There is no facial weakness. Sensation is intact in the first, second, and third divisions of the trigeminal nerve. The corneal reflex is intact.  Auditory: Hearing is intact to finger rub bilaterally.  Cranial nerves IX, X, XI, XII: Phonation is normal. No dysphagia. Tongue is protruded in the midline without atrophy. The gag reflex is intact. Shoulder shrug is normal.     Musculoligamentous ligamentous examination: He has a brace on his right knee. He has decreased range of motion. He has a mildly positive straight leg raising on the left. The left patellar reflex is diminished as is the right.     Medical Decision Making:     Diagnostic Data Set: He is had a lumbar MRI. I \"think\" it shows an extreme lateral disc protrusion L3/4 deviating toward left side. It is been interpreted, however is showing disc degeneration without protrusion.     Assessment: Possible disc herniation L3/4, extreme lateral       Recommendations: I've recommended myelography, post-Myelogram CT scan and EMG and NCV both lower extremities. He has subjective numbness in the distribution of the peroneal nerve on the right side where he has had recent knee operation. He also needs to have a EMG and NCV on the left leg.           Electronically signed by Norberto Puente MD at 4/10/2017  7:48 AM                 "   Operative/Procedure Notes (all)      Norberto Puente MD at 4/21/2017  9:29 AM  Version 1 of 1         LUMBAR DISCECTOMY POSTERIOR 1-2 LEVELS  Procedure Note    Grant Ann  4/21/2017    Pre-op Diagnosis:   Lumbar herniated disc [M51.26]    Post-op Diagnosis:     Post-Op Diagnosis Codes:     * Lumbar herniated disc [M51.26]    Procedure/CPT® Codes:      Procedure(s):  LEFT LUMBAR FORAMINOTOMY DISCECTOMY FAR LATERAL L3-4    Surgeon(s):  Norberto Puente MD    Anesthesia: General    Staff:   Circulator: Lissette Wahl RN  Radiology Technologist: Michael Odonnell, RT  Scrub Person: Nicho Bueno  Nursing Assistant: Keiry Kiran  Assistant: Suzanna Gregg PA-C    Estimated Blood Loss: 200 mL  Urine Voided: * No values recorded between 4/21/2017  6:49 AM and 4/21/2017  9:29 AM *    Specimens:                * No specimens in log *      Drains:           Findings: Disc herniation associated with lateral recess stenosis    Complications: None  Norberto Puente MD     Date: 4/21/2017  Time: 9:29 AM         Electronically signed by Norberto Puente MD at 4/21/2017  9:30 AM      Norberto Puente MD at 4/21/2017  4:13 PM  Version 1 of 1         Preoperative diagnosis: Herniated intervertebral disc L3-L4 and lateral recess syndrome L3-L4, left    Postoperative diagnosis: Same X    Operation performed: Intra-transprocess exploration and nerve root decompression; hemilaminectomy and foraminotomy L3-L4 and disc excision L3-L4, left    Surgeon: Norberto Puente  Assistant: Lela Gregg    Indication: Patient presents with chronic pain in his back rating into his anterior thigh.  Diagnostic studies showed what appeared to be a lateral disc protrusion with calcifications and closure of the lateral recess at L3-4 on the left side and he was admitted for surgical intervention.    .  The risks and benefits of the procedure were discussed with the patient preoperatively.  Consent forms were given and  "signatures obtained.  All questions were answered satisfactorily and the patient wished to proceed with with the procedure.    Operative report:     Subsequent to the induction of general anesthesia the patient was placed in a prone and flexed position.  Prep and drape was performed in the usual fashion.  Through a linear incision, the paraspinal musculature was dissected exposing the spinous process and lamina of L3..  The appropriate level was determined radiographically.  The dissection was carried out over the facet complexes of L2 3 and of L3 4 to expose the transverse processes.  A processes were identified and the end try transverse process space was explored.  There appeared to be a bulge of intravertebral disc however of his calcific.  Therefore, a more \"classic\" laminar approach to the disc was performed.        A self-retaining retractor was inserted.  A laminotomy was performed using the Kerrison punch and Healint Matthew high-speed drill.  Medial facetectomy and foraminotomy was carried out..  The ligamentum flavum was excised and the nerve root identified.    The surgical findings included : The ligament flavum was removed after hemilaminectomy and foraminotomy to decompress the dural cylinder and the L4 nerve root which was markedly compromised.  There was a significant amount of epidural bleeding from a large venous Andrew's.  This was coagulated and hemostasis obtained with FloSeal and Gelfoam.    Having accomplished this a window was made in the disc space and a discectomy was performed out lateral above (superior) the pedicle.  Several large fragments were extruded and removed.  A Chauhan ball was placed through the neural foramen beneath and above the pedicle encountering no compressive elements.    The nerve root and dura were widely decompressed.  A blunt probe passed freely through the neural foramen.  No additional compressive elements were identified.  Hemostasis was obtained with the use of Gelfoam " and FloSeal.  The wound was closed with multiple layers of Vicryl.  A sterile dressing was applied and the patient was taken to recovery in satisfactory condition.    Complications: None appreciated.           Electronically signed by Curtis Esteban MD at 2017  4:17 PM          [address]  [address] [facility] [address]  Phone: [phone]  Fax:  Date: 2017      Referral to Home Health     Patient: Grant Ann MRN: [id number]   [address] [place] [address] : 1955  SSN:    Phone: [phone] Sex: M      INSURANCE PAYOR PLAN GROUP # SUBSCRIBER ID   Primary: WORKERS COMPENSATION [id number]      Referring Provider Information:  CURTIS ESTEBAN Phone: [phone] Fax:       Referral Information:   # Visits: 1 Referral Type: Home Health [42]   Urgency: Routine Referral Reason: Specialty Services Required   Start Date: 2017 End Date: To be determined by Insurer   Diagnosis: Lumbar herniated disc (M51.26 [ICD-10-CM] 722.10 [ICD-9-CM])      Refer to Dept:   Refer to Provider:   Refer to Facility:   Please check incision 2 days a week for 2 weeks. He is diabetic and I'm concerned about wound healing.  Face to Face Visit Date: 2017  Follow-up Provider for Plan of Care? I will be treating the patient on an ongoing basis. Please send me the Plan of Care for signature.  Follow-up Provider: CURTIS ESTEBAN [[id number]]  Reason/Clinical Findings: Spinal surgery  Describe mobility limitations that make leaving home difficult: Spinal surgery  Nursing/Therapeutic Services Requested: Skilled Nursing  Skilled nursing orders: Wound care dressing/changes  Frequency: 1 Week 1     This document serves as a request of services and does not constitute Insurance authorization or approval of services.  To determine eligibility, please contact the members Insurance carrier to verify and review coverage.     If you have medical questions regarding  this request for services. Please contact [address] at [phone] between the hours of 8:00am - 5:00pm (Mon-Fri).             Authorizing Provider:Norberto Puente MD  Order Entered By: Norberto Puente MD 4/22/2017 8:16 AM     Electronically signed by: Norberto Puente MD (NPI: [id number]) 4/22/2017 8:16 AM            Physician Progress Notes (last 72 hours) (Notes from 4/21/2017  1:10 PM through 4/24/2017  1:10 PM)     No notes of this type exist for this encounter.

## 2017-04-24 NOTE — DISCHARGE PLACEMENT REQUEST
"Grant Ann (61 y.o. Male)     Sonja Kellerman RN Case Management  [phone]    Patient discharged with rolling walker and bedside commode. I have included the orders and supporting documentation.     Date of Birth Social Security Number Address Home Phone MRN    1955   [place] [address] [phone] [id number]    [employer] Marital Status          Buddhist        Admission Date Admission Type Admitting Provider Attending Provider Department, Room/Bed    17 Elective Norberto Puente MD  [facility] 5E, S533/1    Discharge Date Discharge Disposition Discharge Destination        2017 Home or Self Care             Attending Provider: (none)    Allergies:  No Known Allergies    Isolation:  None   Infection:  None   Code Status:  Prior    Ht:  71\" (180.3 cm)   Wt:  221 lb (100 kg)    Admission Cmt:  None   Principal Problem:  None                Active Insurance as of 2017     Primary Coverage     Payor Plan Insurance Group Employer/Plan Group    WORKERS COMPENSATION [facility]      Payor Plan Address Payor Plan Phone Number Effective From Effective To    [address] [facility] [phone] 2016     [place] [address]       Subscriber Name Subscriber Birth Date Member ID       GRANT ANN [id number]                 Emergency Contacts      (Rel.) Home Phone Work Phone Mobile Phone    Keiry Ann (Spouse) [phone]          [address] [address]  Phone: [phone]  Fax:  Date Ordered: 2017         Patient: Grant Ann MRN: [id number]   [address] [place] [address] : 1955  SSN:    Phone: [phone] Sex: M     Weight: 221 lb (100 kg)         Ht Readings from Last 1 Encounters:   17 71\" (180.3 cm)         Commode Chair (Order ID: [id number])   Diagnosis: Lumbar herniated disc (M51.26 [ICD-10-CM] 722.10 " "[ICD-9-CM])  Impaired functional mobility, balance, gait, and endurance (Z74.09 [ICD-10-CM] V49.89 [ICD-9-CM])  Impaired mobility and ADLs (Z74.09 [ICD-10-CM] 799.89 [ICD-9-CM])   Quantity: 1     Equipment:  Bedside Commode Chair w/Fixed Arms  Length of Need (99 Months = Lifetime): 99 Months = Lifetime            Authorizing Provider:Norberto Puente MD  Order Entered By: Mirta Hawk RN 2017 9:43 AM     Electronically signed by: Norberto Puente MD (NPI: [id number]) 2017 9:43 AM            [address] [address]  Phone: [phone]  Fax:  Date Ordered: 2017         Patient: Grant Ann MRN: [id number]   [address] [place] [address] : 1955  SSN:    Phone: [phone] Sex: M     Weight: 221 lb (100 kg)         Ht Readings from Last 1 Encounters:   17 71\" (180.3 cm)         Walker (Order ID: [id number])   Diagnosis: Lumbar herniated disc (M51.26 [ICD-10-CM] 722.10 [ICD-9-CM])  Impaired functional mobility, balance, gait, and endurance (Z74.09 [ICD-10-CM] V49.89 [ICD-9-CM])  Impaired mobility and ADLs (Z74.09 [ICD-10-CM] 799.89 [ICD-9-CM])   Quantity: 1     Equipment:  Walker Folding with Wheels  Length of Need (99 Months = Lifetime): 99 Months = Lifetime            Authorizing Provider:Norberto Puente MD  Order Entered By: Mirta Hawk RN 2017 9:42 AM     Electronically signed by: Norberto Puente MD (NPI: [id number]) 2017 9:42 AM                 History & Physical      Norberto Puente MD at 4/10/2017  7:47 AM          Grant Ann had his diagnostic studies today which included myelogram, post myelogram CT scan and EMG/NCV.     The EMG and NCV is normal. The CT scan and myelogram show the presence of disc herniation at L3-L4 deviating toward the left side. This provides clinical correlation to the symptom complex that he has at this time.     HISTORY OF PRESENT ILLNESS: This is " "a 61-year-old who sustained a work-related injury 6/16 with the onset of pain in his back range to his left lower extremity. This is been refractory to physical therapy of several weeks. He is had his right knee operated on. He is had a lumbar MRI. I \"think\" it shows an extreme lateral disc protrusion L3/4 deviating toward left side. It is been interpreted, however is showing disc degeneration without protrusion.         The diagnostic studies confirmed the presence of disc herniation which is consistent with the MRI of findings of my interpretation. I have offered surgical intervention which would include excision of a herniated disc at L3 4 on the left side. I cannot offer a 100% assurance that his symptoms will be sufficiently alleviated to [place] to return to unrestricted duty. Nevertheless, in my opinion, there is little else to offer him other than him \" living with it\".     He will take this under consideration and call our office when he is made his decision. He will be returned to the care of his primary care physician    Chief Complaint   Patient presents with   • Back Pain       sharp pains, worse when sitting, standing walking for long periods   • Numbness       in left leg         HISTORY OF PRESENT ILLNESS: This is a 61-year-old who sustained a work-related injury 6/16 with the onset of pain in his back range to his left lower extremity. This is been refractory to physical therapy of several weeks. He is had his right knee operated on. He is had a lumbar MRI and is referred for neurosurgical consultation.       Medical History         Past Medical History   Diagnosis Date   • Arthritis     • Diabetes mellitus     • Low back pain               Surgical History          Past Surgical History   Procedure Laterality Date   • Knee arthroscopy w/ meniscal repair                      Family History   Problem Relation Age of Onset   • No Known Problems Mother     • No Known Problems Father            " Social History    Social History            Social History   • Marital status:        Spouse name: N/A   • Number of children: N/A   • Years of education: N/A          Occupational History   • Not on file.           Social History Main Topics   • Smoking status: Never Smoker   • Smokeless tobacco: Not on file   • Alcohol use No   • Drug use: Defer   • Sexual activity: Defer           Other Topics Concern   • Not on file          Social History Narrative   • No narrative on file            No Known Allergies        Current Outpatient Prescriptions:   • Canagliflozin (INVOKANA) 100 MG tablet, Take by mouth., Disp: , Rfl:   • citalopram (CeleXA) 20 MG tablet, Take 20 mg by mouth Daily., Disp: , Rfl:   • glyBURIDE-metFORMIN (GLUCOVANCE) 5-500 MG per tablet, , Disp: , Rfl:   • HYDROcodone-acetaminophen (NORCO) 7.5-325 MG per tablet, Take 1 tablet by mouth Every 6 (Six) Hours As Needed for moderate pain (4-6)., Disp: , Rfl:   • HYDROcodone-acetaminophen (VICODIN) 5-500 MG per tablet, Take 1 tablet by mouth Every 6 (Six) Hours As Needed for moderate pain (4-6)., Disp: , Rfl:   • indomethacin (INDOCIN) 50 MG capsule, Take 50 mg by mouth 3 (Three) Times a Day As Needed for mild pain (1-3)., Disp: , Rfl:   • levothyroxine (SYNTHROID, LEVOTHROID) 50 MCG tablet, Take 50 mcg by mouth Daily., Disp: , Rfl:   • metaxalone (SKELAXIN) 800 MG tablet, Take 800 mg by mouth 3 (Three) Times a Day As Needed for muscle spasms., Disp: , Rfl:   • oxyCODONE-acetaminophen (PERCOCET) 7.5-325 MG per tablet, TK 1 T PO Q 4 H PRN P., Disp: , Rfl: 0  • pioglitazone (ACTOS) 30 MG tablet, , Disp: , Rfl:   • simvastatin (ZOCOR) 40 MG tablet, , Disp: , Rfl:      Review of Systems   Musculoskeletal: Positive for back pain and myalgias.   Neurological: Positive for numbness.   All other systems reviewed and are negative.        Neurological Examination:      Vitals        Vitals:     02/23/17 1435   BP: 144/81   BP Location: Right arm   Patient  "Position: Sitting   Cuff Size: Adult   Pulse: 69   Resp: 20   Temp: 97.2 °F (36.2 °C)   TempSrc: Oral   SpO2: 95%   Weight: 224 lb (102 kg)   Height: 71\" (180.3 cm)            Mental status/speech: The patient is alert and oriented. Speech is clear without aphysia or dysarthria. No overt cognitive deficits.     Cranial nerve examination:     Olfaction: Smell is intact.  Vision: Vision is intact without visual field abnormalities. Funduscopic examination is normal. No pupillary irregularity.  Ocular motor examination: The extraocular muscles are intact. There is no diplopia. The pupil is round and reactive to both light and accommodation. There is no nystagmus.  Facial movement/sensation: There is no facial weakness. Sensation is intact in the first, second, and third divisions of the trigeminal nerve. The corneal reflex is intact.  Auditory: Hearing is intact to finger rub bilaterally.  Cranial nerves IX, X, XI, XII: Phonation is normal. No dysphagia. Tongue is protruded in the midline without atrophy. The gag reflex is intact. Shoulder shrug is normal.     Musculoligamentous ligamentous examination: He has a brace on his right knee. He has decreased range of motion. He has a mildly positive straight leg raising on the left. The left patellar reflex is diminished as is the right.     Medical Decision Making:     Diagnostic Data Set: He is had a lumbar MRI. I \"think\" it shows an extreme lateral disc protrusion L3/4 deviating toward left side. It is been interpreted, however is showing disc degeneration without protrusion.     Assessment: Possible disc herniation L3/4, extreme lateral       Recommendations: I've recommended myelography, post-Myelogram CT scan and EMG and NCV both lower extremities. He has subjective numbness in the distribution of the peroneal nerve on the right side where he has had recent knee operation. He also needs to have a EMG and NCV on the left leg.          Electronically signed by Norberto BARRIENTOS" "MD Kwame at 4/10/2017  7:48 AM      CRAEY Rhoades at 4/21/2017  6:23 AM          Pre-Op H&P (See Recent Office Note Attached)    Chief complaint: Low back pain into LLE    HPI:      Patient is a 61 y.o. male who presents with L3-4 extreme lateral disc protrusion deviating toward left side and here today for left lumbar discectomy, far lateral L3-4    Review of Systems:  General ROS:  no fever, chills, rashes, No change since last office visit.  Skin area on right mid back with some yellow drainage.    Cardiovascular ROS: no chest pain or dyspnea on exertion  Respiratory ROS: no cough, shortness of breath, or wheezing    Immunization History:   Influenza:  no  Pneumococcal:  no  Tetanus:  unknown    Vital Signs:  /81 (BP Location: Right arm, Patient Position: Lying)  Pulse 68  Temp 97.3 °F (36.3 °C) (Temporal Artery )   Resp 16  Ht 71\" (180.3 cm)  Wt 221 lb (100 kg)  SpO2 97%  BMI 30.82 kg/m2    Physical Exam:    CV:  S1S2 regular rate and rhythm, no murmur               Resp:  Clear to auscultation; respirations regular, even and unlabored               Skin:  Small, dime size lesion with serous/nonodorous drainage, no surrounding erythema or edema    Results Review:    I reviewed the patient's new clinical results.    Cancer Staging (if applicable)  Cancer Patient: __ yes x__no __unknown; If yes, clinical stage T:__ N:__M:__, stage group or __N/A    Assessment/Plan:    L3-4 extreme lateral disc protrusion deviating toward left side and here today for left lumbar discectomy, far lateral L3-4    CAREY Baugh  4/21/2017 6:23 AM         Electronically signed by Norberto Puente MD at 4/21/2017  6:28 AM    Source Note             Grant Marissa had his diagnostic studies today which included myelogram, post myelogram CT scan and EMG/NCV.     The EMG and NCV is normal. The CT scan and myelogram show the presence of disc herniation at L3-L4 deviating toward the left side. This provides " "clinical correlation to the symptom complex that he has at this time.     HISTORY OF PRESENT ILLNESS: This is a 61-year-old who sustained a work-related injury 6/16 with the onset of pain in his back range to his left lower extremity. This is been refractory to physical therapy of several weeks. He is had his right knee operated on. He is had a lumbar MRI. I \"think\" it shows an extreme lateral disc protrusion L3/4 deviating toward left side. It is been interpreted, however is showing disc degeneration without protrusion.         The diagnostic studies confirmed the presence of disc herniation which is consistent with the MRI of findings of my interpretation. I have offered surgical intervention which would include excision of a herniated disc at L3 4 on the left side. I cannot offer a 100% assurance that his symptoms will be sufficiently alleviated to [place] to return to unrestricted duty. Nevertheless, in my opinion, there is little else to offer him other than him \" living with it\".     He will take this under consideration and call our office when he is made his decision. He will be returned to the care of his primary care physician    Chief Complaint   Patient presents with   • Back Pain       sharp pains, worse when sitting, standing walking for long periods   • Numbness       in left leg         HISTORY OF PRESENT ILLNESS: This is a 61-year-old who sustained a work-related injury 6/16 with the onset of pain in his back range to his left lower extremity. This is been refractory to physical therapy of several weeks. He is had his right knee operated on. He is had a lumbar MRI and is referred for neurosurgical consultation.       Medical History         Past Medical History   Diagnosis Date   • Arthritis     • Diabetes mellitus     • Low back pain               Surgical History          Past Surgical History   Procedure Laterality Date   • Knee arthroscopy w/ meniscal repair                      Family History "   Problem Relation Age of Onset   • No Known Problems Mother     • No Known Problems Father            Social History    Social History            Social History   • Marital status:        Spouse name: N/A   • Number of children: N/A   • Years of education: N/A          Occupational History   • Not on file.           Social History Main Topics   • Smoking status: Never Smoker   • Smokeless tobacco: Not on file   • Alcohol use No   • Drug use: Defer   • Sexual activity: Defer           Other Topics Concern   • Not on file          Social History Narrative   • No narrative on file            No Known Allergies        Current Outpatient Prescriptions:   • Canagliflozin (INVOKANA) 100 MG tablet, Take by mouth., Disp: , Rfl:   • citalopram (CeleXA) 20 MG tablet, Take 20 mg by mouth Daily., Disp: , Rfl:   • glyBURIDE-metFORMIN (GLUCOVANCE) 5-500 MG per tablet, , Disp: , Rfl:   • HYDROcodone-acetaminophen (NORCO) 7.5-325 MG per tablet, Take 1 tablet by mouth Every 6 (Six) Hours As Needed for moderate pain (4-6)., Disp: , Rfl:   • HYDROcodone-acetaminophen (VICODIN) 5-500 MG per tablet, Take 1 tablet by mouth Every 6 (Six) Hours As Needed for moderate pain (4-6)., Disp: , Rfl:   • indomethacin (INDOCIN) 50 MG capsule, Take 50 mg by mouth 3 (Three) Times a Day As Needed for mild pain (1-3)., Disp: , Rfl:   • levothyroxine (SYNTHROID, LEVOTHROID) 50 MCG tablet, Take 50 mcg by mouth Daily., Disp: , Rfl:   • metaxalone (SKELAXIN) 800 MG tablet, Take 800 mg by mouth 3 (Three) Times a Day As Needed for muscle spasms., Disp: , Rfl:   • oxyCODONE-acetaminophen (PERCOCET) 7.5-325 MG per tablet, TK 1 T PO Q 4 H PRN P., Disp: , Rfl: 0  • pioglitazone (ACTOS) 30 MG tablet, , Disp: , Rfl:   • simvastatin (ZOCOR) 40 MG tablet, , Disp: , Rfl:      Review of Systems   Musculoskeletal: Positive for back pain and myalgias.   Neurological: Positive for numbness.   All other systems reviewed and are negative.        Neurological  "Examination:      Vitals        Vitals:     02/23/17 1435   BP: 144/81   BP Location: Right arm   Patient Position: Sitting   Cuff Size: Adult   Pulse: 69   Resp: 20   Temp: 97.2 °F (36.2 °C)   TempSrc: Oral   SpO2: 95%   Weight: 224 lb (102 kg)   Height: 71\" (180.3 cm)            Mental status/speech: The patient is alert and oriented. Speech is clear without aphysia or dysarthria. No overt cognitive deficits.     Cranial nerve examination:     Olfaction: Smell is intact.  Vision: Vision is intact without visual field abnormalities. Funduscopic examination is normal. No pupillary irregularity.  Ocular motor examination: The extraocular muscles are intact. There is no diplopia. The pupil is round and reactive to both light and accommodation. There is no nystagmus.  Facial movement/sensation: There is no facial weakness. Sensation is intact in the first, second, and third divisions of the trigeminal nerve. The corneal reflex is intact.  Auditory: Hearing is intact to finger rub bilaterally.  Cranial nerves IX, X, XI, XII: Phonation is normal. No dysphagia. Tongue is protruded in the midline without atrophy. The gag reflex is intact. Shoulder shrug is normal.     Musculoligamentous ligamentous examination: He has a brace on his right knee. He has decreased range of motion. He has a mildly positive straight leg raising on the left. The left patellar reflex is diminished as is the right.     Medical Decision Making:     Diagnostic Data Set: He is had a lumbar MRI. I \"think\" it shows an extreme lateral disc protrusion L3/4 deviating toward left side. It is been interpreted, however is showing disc degeneration without protrusion.     Assessment: Possible disc herniation L3/4, extreme lateral       Recommendations: I've recommended myelography, post-Myelogram CT scan and EMG and NCV both lower extremities. He has subjective numbness in the distribution of the peroneal nerve on the right side where he has had recent knee " operation. He also needs to have a EMG and NCV on the left leg.           Electronically signed by Norberto Puente MD at 4/10/2017  7:48 AM                     Physical Therapy Notes (last 72 hours) (Notes from 4/21/2017  2:14 PM through 4/24/2017  2:14 PM)      Jessica Perales, PT at 4/22/2017  9:31 AM  Version 1 of 1         Problem: Patient Care Overview (Adult)  Goal: Plan of Care Review  Outcome: Outcome(s) achieved Date Met:  04/22/17 04/22/17 0928   Coping/Psychosocial Response Interventions   Plan Of Care Reviewed With patient;spouse   Outcome Evaluation   Outcome Summary/Follow up Plan PT eval completed. Pt s/p L3-L4 discectomy. Pt. amb in hallway 400 ft w/ RW and SBA, and negotiated 3 steps w/ handrail w/ CGA. Provided patient/CG education regarding home safety, spinal precautions, and issued handout for HEP. Anticipate D/C to home w/ wife's assist later today.   Patient Care Overview   Progress improving         Problem: Inpatient Physical Therapy  Goal: Gait Training Goal STG- PT  Outcome: Outcome(s) achieved Date Met:  04/22/17 04/22/17 0928   Gait Training PT STG   Gait Training Goal PT STG, Date Established 04/22/17   Gait Training Goal PT STG, Time to Achieve 1 day   Gait Training Goal PT STG, [place] Level supervision required   Gait Training Goal PT STG, Assist Device walker, rolling   Gait Training Goal PT STG, Distance to Achieve 350   Gait Training Goal PT STG, Outcome goal met       Goal: Stair Training Goal STG- PT  Outcome: Outcome(s) achieved Date Met:  04/22/17 04/22/17 0928   Stair Training PT STG   Stair Training Goal PT STG, Date Established 04/22/17   Stair Training Goal PT STG, Time to Achieve 1 day   Stair Training Goal PT STG, Number of Steps 2   Stair Training Goal PT STG, [place] Level contact guard assist   Stair Training Goal PT STG, Assist Device 2 handrails   Stair Training Goal PT STG, Outcome goal met              Electronically signed by Jessica BERRIOS  Angella, PT at 2017  9:31 AM      Jessica Perales PT at 2017  9:33 AM  Version 1 of 1         Acute Care - Physical Therapy Initial Eval/Discharge  JERMAN Berg     Patient Name: Grant Ann  : 1955  MRN: [id number]  Today's Date: 2017   Onset of Illness/Injury or Date of Surgery Date: 17  Date of Referral to PT: 17  Referring Physician: MD Kwame      Admit Date: 2017    Visit Dx:    ICD-10-CM ICD-9-CM   1. Impaired functional mobility, balance, gait, and endurance Z74.09 V49.89   2. Lumbar herniated disc M51.26 722.10   3. Impaired mobility and ADLs Z74.09 799.89     Patient Active Problem List   Diagnosis   • Lumbar herniated disc     Past Medical History:   Diagnosis Date   • Arthritis    • Depression    • Diabetes mellitus     DX. , FSBS 1 X MONTH   • Disease of thyroid gland    • Heartburn    • High cholesterol    • Low back pain    • Recurrent boils    • Wears glasses    • Wears partial dentures      Past Surgical History:   Procedure Laterality Date   • KNEE ARTHROSCOPY W/ MENISCAL REPAIR Right 2016          PT ASSESSMENT (last 72 hours)      PT Evaluation       17 0820 17 0819    Rehab Evaluation    Document Type evaluation  -MB evaluation  -TA    Subjective Information agree to therapy;complains of;pain  -MB agree to therapy;complains of;pain  -TA    Patient Effort, Rehab Treatment good  -MB good  -TA    Symptoms Noted During/After Treatment  none  -TA    General Information    Patient Profile Review yes  -MB yes  -TA    Onset of Illness/Injury or Date of Surgery Date 17  -MB 17  -TA    Referring Physician MD Kwame  -MB Dr Puente  -TA    General Observations Pt. supine w/ IV R UE, on 2 L O2 NC.  Nsg consent for PT.  Pt's wife present at bedside.  -MB Pt supine, IV intact L hand, PCA, 2L O2 per NC; wife present in room.  -TA    Pertinent History Of Current Problem Pt.adm for surgical management of back and L LE pain and  dysfunction w/ L3-L4 extreme lateral disc protusion.  Pt. s/p L3-L4 discectomy 4/21.  Pt. w/ h/o work-related injury 6/16 and R knee sx.  -MB Pt admitted with h/o back pain, lumbar herniated discs, now s/p analy-lami/foramenotomy L3-L4, Left  -TA    Precautions/Limitations fall precautions;spinal precautions  -MB fall precautions;spinal precautions  -TA    Prior Level of Function independent:;all household mobility;community mobility;gait;transfer;bed mobility;wound care;driving;using stairs  -MB independent:;all household mobility;gait;transfer;bed mobility;ADL's   progressively worsening pain with ambulation, LB ADLs  -TA    Equipment Currently Used at Home cane, straight;crutches, axillary  -MB crutches;cane, straight  -TA    Plans/Goals Discussed With patient;spouse/S.O.;agreed upon  -MB patient;spouse/S.O.;agreed upon  -TA    Risks Reviewed patient:;spouse/S.O.:;LOB;dizziness;increased discomfort;change in vital signs;increased drainage  -MB patient:;spouse/S.O.:;LOB;nausea/vomiting;dizziness;increased discomfort;change in vital signs;lines disloged  -TA    Benefits Reviewed patient:;spouse/S.O.:;improve function;increase independence;increase balance;decrease pain;decrease risk of DVT  -MB patient:;spouse/S.O.:;improve function;increase independence;increase strength;increase balance;decrease pain;increase knowledge  -TA    Barriers to Rehab none identified  -MB none identified  -TA    Living Environment    Lives With spouse  -MB spouse  -TA    Living Arrangements house  -MB house  -TA    Home Accessibility stairs to enter home  -MB bed and bath on same level  -TA    Number of Stairs to Enter Home 2  -MB     Clinical Impression    Date of Referral to PT 04/21/17  -MB     PT Diagnosis L3-L4 discectomy  -MB     Functional Level At Time Of Evaluation Pt. required CGA for safe mobility.  -MB     Patient/Family Goals Statement Return to home and PLOF.  -MB     Criteria for Skilled Therapeutic Interventions Met  yes;treatment indicated  -MB     Rehab Potential good, to achieve stated therapy goals  -MB     Vital Signs    Pre Systolic BP Rehab --   VSS.  Nsg cleared for tx.  -MB --   Nurse cleared pt for tx, vitals stable.  -TA    Pre Patient Position Supine  -MB Supine  -TA    Intra Patient Position Standing  -MB Standing  -TA    Post Patient Position Supine  -MB Supine  -TA    Pain Assessment    Pain Assessment 0-10  -MB 0-10  -TA    Pain Score 6  -MB 6  -TA    Post Pain Score 6  -MB 6  -TA    Pain Type Acute pain;Surgical pain  -MB Surgical pain  -TA    Pain Location Back  -MB Back  -TA    Pain Orientation  Lower  -TA    Pain Intervention(s) Medication (See MAR);Repositioned;Ambulation/increased activity   Pt. premedicated.  -MB Medication (See MAR);Repositioned;Ambulation/increased activity  -TA    Response to Interventions  tolerated  -TA    Vision Assessment/Intervention    Visual Impairment WFL  -MB WFL  -TA    Cognitive Assessment/Intervention    Current Cognitive/Communication Assessment functional  -MB functional  -TA    Orientation Status oriented x 4  -MB oriented x 4  -TA    Follows Commands/Answers Questions 100% of the time;able to follow single-step instructions  -% of the time;able to follow multi-step instructions  -TA    Personal Safety WNL/WFL  -MB WNL/WFL  -TA    Personal Safety Interventions fall prevention program maintained;gait belt;nonskid shoes/slippers when out of bed  -MB fall prevention program maintained;gait belt;nonskid shoes/slippers when out of bed;supervised activity  -TA    ROM (Range of Motion)    General ROM no range of motion deficits identified  -MB no range of motion deficits identified  -TA    General ROM Detail  BUEs  -TA    MMT (Manual Muscle Testing)    General MMT Assessment lower extremity strength deficits identified  -MB no strength deficits identified  -TA    General MMT Assessment Detail --   B LE not formally tested d/t sx.  Appear WFL.  -MB BUE formal testing  deferred 2/2 spinal px's; grossly WFLs  -TA    Bed Mobility, Assessment/Treatment    Bed Mobility, Assistive Device head of bed elevated  -MB     Bed Mobility, Roll Left, [place] supervision required;verbal cues required   log roll technique  -MB supervision required;verbal cues required  -TA    Bed Mobility, Roll Right, [place] independent  -MB independent  -TA    Bed Mob, Sidelying to Sit, [place] supervision required;verbal cues required  -MB supervision required;verbal cues required  -TA    Bed Mob, Sit to Sidelying, [place] supervision required;verbal cues required  -MB supervision required;verbal cues required  -TA    Bed Mobility, Impairments ROM decreased;pain  -MB ROM decreased;pain  -TA    Bed Mobility, Comment VCs for log roll technique  -MB VCs for for log roll technique   -TA    Transfer Assessment/Treatment    Transfers, Sit-Stand [place] supervision required;verbal cues required  -MB supervision required;verbal cues required  -TA    Transfers, Stand-Sit [place] supervision required;verbal cues required  -MB supervision required;verbal cues required  -TA    Transfers, Sit-Stand-Sit, Assist Device rolling walker  -MB rolling walker  -TA    Transfer, Safety Issues --   None noted.  -MB     Transfer, Impairments pain;ROM decreased  -MB pain  -TA    Transfer, Comment VCs for safe hand placement and spinal precautions.  -MB     Gait Assessment/Treatment    Gait, [place] Level stand by assist  -MB     Gait, Assistive Device rolling walker  -MB     Gait, Distance (Feet) 400  -MB     Gait, Gait Pattern Analysis swing-through gait  -MB     Gait, Safety Issues supplemental O2  -MB     Gait, Impairments pain;ROM decreased  -MB     Gait, Comment Pt. amb w/ step-through gait and normal josee and VCs for upright posture and to look ahead vs. looking down at ground.  -MB     Stairs Assessment/Treatment    Number of Stairs 3  -MB     Stairs, Handrail Location both sides  -MB      Stairs, [place] Level contact guard assist;verbal cues required  -MB     Stairs, Impairments ROM decreased;pain  -MB     Stairs, Comment Pt. amb 3 steps x 2 w/ B handrails, CGA, and VCs for sequencing.  -MB Please refer to PT notes  -TA    Motor Skills/Interventions    Additional Documentation Balance Skills Training (Group)  -MB Balance Skills Training (Group);Fine Motor Coordination Training (Group)  -TA    Balance Skills Training    Training Strategies (Balance Skills)  LBD  -TA    Sitting-Level of Assistance  Independent  -TA    Sitting-Balance Support  Feet supported  -TA    Sitting-Balance Activities  Lateral lean;Reaching for objects;Reaching across midline;Trunk control activities  -TA    Standing-Level of Assistance Contact guard  -MB Contact guard  -TA    Static Standing Balance Support assistive device  -MB assistive device  -TA    Standing-Balance Activities Weight Shift R-L;Weight Shift A-P  -MB Weight Shift A-P;Weight Shift R-L;Reaching for objects  -TA    Gait Balance Support assistive device  -MB     Gait Balance Activities side-stepping;scanning environment R/L  -MB     Therapy Exercises    Bilateral Lower Extremities LAQ;ankle pumps/circles;hip ER  -MB     Fine Motor Coordination Training    Opposition  Right:;Left:;intact;other (comment)   [place] [id number] intact  -TA    Sensory Assessment/Intervention    Light Touch LLE;RLE  -MB LUE;RUE  -TA    LUE Light Touch  other (see comments)   Pt states intermittent tingling in [place] hands at times.  -TA    LLE Light Touch mild impairment  -MB     RLE Light Touch WNL  -MB     Positioning and Restraints    Pre-Treatment Position in bed  -MB in bed  -TA    Post Treatment Position bed  -MB bed  -TA    In Bed notified nsg;with OT  -MB supine;call light within reach;encouraged to call for assist;with family/caregiver;side rails up x2;legs elevated   IV intact L hand  -TA      04/22/17 0017 04/21/17 2200    Muscle Tone Assessment    Muscle Tone Assessment  Bilateral Lower Extremities  -KG Bilateral Lower Extremities  -KG    Bilateral Lower Extremities Muscle Tone Assessment mildly decreased tone  -KG mildly decreased tone  -KG      04/21/17 2045 04/21/17 0600    General Information    Equipment Currently Used at Home  cane, straight  -MT    Living Environment    Lives With  spouse  -MT    Living Arrangements  house  -MT    Home Accessibility  bed and bath on same level  -MT    Stair Railings at Home  none  -MT    Type of Financial/Environmental Concern  none  -MT    Transportation Available  car  -MT    Muscle Tone Assessment    Muscle Tone Assessment Bilateral Lower Extremities  -KG     Bilateral Lower Extremities Muscle Tone Assessment mildly decreased tone  -KG       04/20/17 1359       General Information    Equipment Currently Used at Home none  -MP     Living Environment    Lives With spouse  -MP     Living Arrangements house  -MP     Home Accessibility bed and bath on same level;no concerns  -MP     Stair Railings at Home none  -MP     Type of Financial/Environmental Concern none  -MP     Transportation Available car  -MP       User Key  (r) = Recorded By, (t) = Taken By, (c) = Cosigned By    Initials Name Provider Type    MT Jessica Gonzalez, RN Registered Nurse    MARYBETH Fournier, RN Registered Nurse    LINDSAY Lazar, OT Occupational Therapist    MB Jessica Perales, PT Physical Therapist    KG Adryan Almaguer, ZAC Registered Nurse          Physical Therapy Education     Title: PT OT SLP Therapies (Done)     Topic: Physical Therapy (Done)     Point: Mobility training (Done)    Learning Progress Summary    Learner Readiness Method Response Comment Documented by Status   Patient Acceptance E,D,H DARIUS HENNING 04/22/17 0927 Done   Family Acceptance E,D,H DARIUS HENNING 04/22/17 0927 Done               Point: Home exercise program (Done)    Learning Progress Summary    Learner Readiness Method Response Comment Documented by Status   Patient Acceptance E,D,H  MILADYDARIUS EPSTEIN 04/22/17 0927 Done   Family Acceptance E,D,H MILADYDARIUS EPSTEIN 04/22/17 0927 Done               Point: Body mechanics (Done)    Learning Progress Summary    Learner Readiness Method Response Comment Documented by Status   Patient Acceptance E,D,H MILADYDARIUS EPSTEIN 04/22/17 0927 Done   Family Acceptance E,D,H DARIUS HENNING  MB 04/22/17 0927 Done               Point: Precautions (Done)    Learning Progress Summary    Learner Readiness Method Response Comment Documented by Status   Patient Acceptance E,D,H MILADYDARIUS EPSTEIN 04/22/17 0927 Done   Family Acceptance E,D,H DARIUS HENNING  MB 04/22/17 0927 Done                      User Key     Initials Effective Dates Name Provider Type Discipline    MB 03/14/16 -  Jessica Perales, PT Physical Therapist PT                PT Recommendation and Plan  Anticipated Equipment Needs At Discharge: front wheeled walker, bedside commode  Anticipated Discharge Disposition: home with assist  Planned Therapy Interventions: balance training, gait training, patient/family education  PT Frequency: evaluation only  Plan of Care Review  Plan Of Care Reviewed With: patient, spouse  Progress: improving  Outcome Summary/Follow up Plan: PT eval completed.  Pt s/p L3-L4 discectomy. Pt. amb in hallway 400 ft w/ RW and SBA, and negotiated 3 steps w/ handrail w/ CGA.  Provided patient/CG education regarding home safety, spinal precautions, and issued handout for HEP.  Anticipate D/C to home w/ wife's assist later today.          IP PT Goals       04/22/17 0928          Gait Training PT STG    Gait Training Goal PT STG, Date Established 04/22/17  -MB      Gait Training Goal PT STG, Time to Achieve 1 day  -MB      Gait Training Goal PT STG, [place] Level supervision required  -MB      Gait Training Goal PT STG, Assist Device walker, rolling  -MB      Gait Training Goal PT STG, Distance to Achieve 350  -MB      Gait Training Goal PT STG, Outcome goal met  -MB      Stair Training PT STG    Stair Training Goal PT STG, Date  Established 04/22/17  -MB      Stair Training Goal PT STG, Time to Achieve 1 day  -MB      Stair Training Goal PT STG, Number of Steps 2  -MB      Stair Training Goal PT STG, [place] Level contact guard assist  -MB      Stair Training Goal PT STG, Assist Device 2 handrails  -MB      Stair Training Goal PT STG, Outcome goal met  -MB        User Key  (r) = Recorded By, (t) = Taken By, (c) = Cosigned By    Initials Name Provider Type    TETE Perales, PT Physical Therapist                Outcome Measures       04/22/17 0820 04/22/17 0819       How much help from another person do you currently need...    Turning from your back to your side while in flat bed without using bedrails? 4  -MB      Moving from lying on back to sitting on the side of a flat bed without bedrails? 4  -MB      Moving to and from a bed to a chair (including a wheelchair)? 3  -MB      Standing up from a chair using your arms (e.g., wheelchair, bedside chair)? 3  -MB      Climbing 3-5 steps with a railing? 3  -MB      To walk in hospital room? 3  -MB      AM-PAC 6 Clicks Score 20  -MB      How much help from another is currently needed...    Putting on and taking off regular lower body clothing?  3  -TA     Bathing (including washing, rinsing, and drying)  3  -TA     Toileting (which includes using toilet bed pan or urinal)  3  -TA     Putting on and taking off regular upper body clothing  4  -TA     Taking care of personal grooming (such as brushing teeth)  3  -TA     Eating meals  4  -TA     Score  20  -TA     Functional Assessment    Outcome Measure Options AM-PAC 6 Clicks Basic Mobility (PT)  -MB AM-PAC 6 Clicks Daily Activity (OT)  -TA       User Key  (r) = Recorded By, (t) = Taken By, (c) = Cosigned By    Initials Name Provider Type    LINDSAY Lazar, OT Occupational Therapist    TETE Perales, PT Physical Therapist           Time Calculation:         PT Charges       04/22/17 0948          Time Calculation     Start Time 0820  -MB      PT Received On 04/22/17  -MB        User Key  (r) = Recorded By, (t) = Taken By, (c) = Cosigned By    Initials Name Provider Type    TETE Perales PT Physical Therapist          Therapy Charges for Today     Code Description Service Date Service Provider Modifiers Qty    42266560544 HC PT MOBILITY CURRENT 4/22/2017 Jessica Perales, PT GP, CJ 1    47412900412 HC PT MOBILITY PROJECTED 4/22/2017 Jessica Perales, PT GP, CJ 1    11109657621 HC PT MOBILITY DISCHARGE 4/22/2017 Jessica Perales, PT GP, CJ 1    [id number] HC PT EVAL MOD COMPLEXITY 4 4/22/2017 Jessica Perales, PT GP 1          PT G-Codes  PT Professional Judgement Used?: Yes  Outcome Measure Options: AM-PAC 6 Clicks Basic Mobility (PT)  Score: 20  Functional Limitation: Mobility: Walking and moving around  Mobility: Walking and Moving Around Current Status (): At least 20 percent but less than 40 percent impaired, limited or restricted  Mobility: Walking and Moving Around Goal Status (): At least 20 percent but less than 40 percent impaired, limited or restricted  Mobility: Walking and Moving Around Discharge Status (): At least 20 percent but less than 40 percent impaired, limited or restricted    PT Discharge Summary  Anticipated Discharge Disposition: home with assist  Reason for Discharge: Discharge from facility  Discharge Destination: Home with assist    Jessica Perales PT  4/22/2017          Electronically signed by Jessica Perales PT at 4/22/2017  9:33 AM           Occupational Therapy Notes (last 72 hours) (Notes from 4/21/2017  2:14 PM through 4/24/2017  2:14 PM)      Jd Lazar OT at 4/22/2017  9:25 AM  Version 1 of 1         Problem: Patient Care Overview (Adult)  Goal: Plan of Care Review  Outcome: Ongoing (interventions implemented as appropriate)    04/22/17 0923   Coping/Psychosocial Response Interventions   Plan Of Care Reviewed With patient;spouse   Outcome  Evaluation   Outcome Summary/Follow up Plan Pt presents with fxl decline from PLOF, deficits in ADL performance, fxl mobility, occupational endurance; will benefit from skilled OT services to address deficits, facilitate increased fxl I, safe transition to home with assist.         Problem: Inpatient Occupational Therapy  Goal: Bed Mobility Goal LTG- OT  Outcome: Ongoing (interventions implemented as appropriate)    17   Bed Mobility OT LTG   Bed Mobility OT LTG, Date Established 17   Bed Mobility OT LTG, Time to Achieve 1 wk   Bed Mobility OT LTG, Activity Type all bed mobility   Bed Mobility OT LTG, [place] Level conditional independence   Bed Mobility OT LTG, Outcome goal ongoing       Goal: Patient Education Goal LTG- OT  Outcome: Ongoing (interventions implemented as appropriate)    17   Patient Education OT LTG   Patient Education OT LTG, Date Established 17   Patient Education OT LTG, Time to Achieve 1 wk   Patient Education OT LTG, Education Type precautions per surgeon;posture/body mechanics;adaptive equipment mgmt   Patient Education OT LTG, Education Understanding verbalizes understanding;demonstrates adequately   Patient Education OT LTG Outcome goal ongoing       Goal: LB Dressing Goal LTG- OT  Outcome: Ongoing (interventions implemented as appropriate)    17   LB Dressing OT LTG   LB Dressing Goal OT LTG, Date Established 17   LB Dressing Goal OT LTG, Time to Achieve 1 wk   LB Dressing Goal OT LTG, [place] Level conditional independence   LB Dressing Goal OT LTG, Adaptive Equipment (AE)   LB Dressing Goal OT LTG, Outcome goal ongoing              Electronically signed by Jd Lazar OT at 2017  9:25 AM      Jd Lazar OT at 2017  9:27 AM  Version 1 of 1         Acute Care - Occupational Therapy Initial Evaluation  [facility]     Patient Name: Grant Ann  : 1955  MRN: [id number]  Today's Date:  4/22/2017  Onset of Illness/Injury or Date of Surgery Date: 04/21/17  Date of Referral to OT: 04/21/17  Referring Physician: MD Kwame    Admit Date: 4/21/2017       ICD-10-CM ICD-9-CM   1. Impaired functional mobility, balance, gait, and endurance Z74.09 V49.89   2. Lumbar herniated disc M51.26 722.10   3. Impaired mobility and ADLs Z74.09 799.89     Patient Active Problem List   Diagnosis   • Lumbar herniated disc     Past Medical History:   Diagnosis Date   • Arthritis    • Depression    • Diabetes mellitus     DX. 1992, FSBS 1 X MONTH   • Disease of thyroid gland    • Heartburn    • High cholesterol    • Low back pain    • Recurrent boils    • Wears glasses    • Wears partial dentures      Past Surgical History:   Procedure Laterality Date   • KNEE ARTHROSCOPY W/ MENISCAL REPAIR Right 11/18/2016          OT ASSESSMENT FLOWSHEET (last 72 hours)      OT Evaluation       04/22/17 0820 04/22/17 0819 04/22/17 0017 04/21/17 2200 04/21/17 2045    Rehab Evaluation    Document Type evaluation  -MB evaluation  -TA       Subjective Information agree to therapy;complains of;pain  -MB agree to therapy;complains of;pain  -TA       Patient Effort, Rehab Treatment good  -MB good  -TA       Symptoms Noted During/After Treatment  none  -TA       General Information    Patient Profile Review yes  -MB yes  -TA       Onset of Illness/Injury or Date of Surgery Date 04/21/17  -MB 04/21/17  -TA       Referring Physician MD Kwame  -MB Dr Puente  -TA       General Observations Pt. supine w/ IV R UE, on 2 L O2 NC.  Nsg consent for PT.  Pt's wife present at bedside.  -MB Pt supine, IV intact L hand, PCA, 2L O2 per NC; wife present in room.  -TA       Pertinent History Of Current Problem Pt.adm for surgical management of back and L LE pain and dysfunction w/ L3-L4 extreme lateral disc protusion.  Pt. s/p L3-L4 discectomy 4/21.  Pt. w/ h/o work-related injury 6/16 and R knee sx.  -MB Pt admitted with h/o back pain, lumbar herniated discs,  now s/p analy-lami/foramenotomy L3-L4, Left  -TA       Precautions/Limitations fall precautions;spinal precautions  -MB fall precautions;spinal precautions  -TA       Prior Level of Function independent:;all household mobility;community mobility;gait;transfer;bed mobility;wound care;driving;using stairs  -MB independent:;all household mobility;gait;transfer;bed mobility;ADL's   progressively worsening pain with ambulation, LB ADLs  -TA       Equipment Currently Used at Home cane, straight;crutches, axillary  -MB crutches;cane, straight  -TA       Plans/Goals Discussed With patient;spouse/S.O.;agreed upon  -MB patient;spouse/S.O.;agreed upon  -TA       Risks Reviewed patient:;spouse/S.O.:;LOB;dizziness;increased discomfort;change in vital signs;increased drainage  -MB patient:;spouse/S.O.:;LOB;nausea/vomiting;dizziness;increased discomfort;change in vital signs;lines disloged  -TA       Benefits Reviewed patient:;spouse/S.O.:;improve function;increase independence;increase balance;decrease pain;decrease risk of DVT  -MB patient:;spouse/S.O.:;improve function;increase independence;increase strength;increase balance;decrease pain;increase knowledge  -TA       Barriers to Rehab none identified  -MB none identified  -TA       Living Environment    Lives With spouse  -MB spouse  -TA       Living Arrangements house  -MB house  -TA       Home Accessibility stairs to enter home  -MB bed and bath on same level  -TA       Number of Stairs to Enter Home 2  -MB        Clinical Impression    Date of Referral to OT  04/21/17  -TA       OT Diagnosis  Impaired mobility and ADLs  -TA       Impairments Found (describe specific impairments)  ergonomics and body mechanics;gait, locomotion, and balance;ROM;aerobic capacity/endurance  -TA       Patient/Family Goals Statement  Return home.  -TA       Criteria for Skilled Therapeutic Interventions Met  yes;treatment indicated  -TA       Rehab Potential  good, to achieve stated therapy goals   -TA       Therapy Frequency  daily   Per priority policy  -TA       Anticipated Equipment Needs At Discharge  bedside commode;front wheeled walker   ADL kit except leg  issued, training initiated  -TA       Anticipated Discharge Disposition  home with assist  -TA       Vital Signs    Pre Systolic BP Rehab --   VSS.  Nsg cleared for tx.  -MB --   Nurse cleared pt for tx, vitals stable.  -TA       Pre Patient Position Supine  -MB Supine  -TA       Intra Patient Position Standing  -MB Standing  -TA       Post Patient Position Supine  -MB Supine  -TA       Pain Assessment    Pain Assessment 0-10  -MB 0-10  -TA       Pain Score 6  -MB 6  -TA       Post Pain Score 6  -MB 6  -TA       Pain Type Acute pain;Surgical pain  -MB Surgical pain  -TA       Pain Location Back  -MB Back  -TA       Pain Orientation  Lower  -TA       Pain Intervention(s) Medication (See MAR);Repositioned;Ambulation/increased activity   Pt. premedicated.  -MB Medication (See MAR);Repositioned;Ambulation/increased activity  -TA       Response to Interventions  tolerated  -TA       Vision Assessment/Intervention    Visual Impairment WFL  -MB WFL  -TA       Cognitive Assessment/Intervention    Current Cognitive/Communication Assessment functional  -MB functional  -TA       Orientation Status oriented x 4  -MB oriented x 4  -TA       Follows Commands/Answers Questions 100% of the time;able to follow single-step instructions  -% of the time;able to follow multi-step instructions  -TA       Personal Safety WNL/WFL  -MB WNL/WFL  -TA       Personal Safety Interventions fall prevention program maintained;gait belt;nonskid shoes/slippers when out of bed  -MB fall prevention program maintained;gait belt;nonskid shoes/slippers when out of bed;supervised activity  -TA       ROM (Range of Motion)    General ROM no range of motion deficits identified  -MB no range of motion deficits identified  -TA       General ROM Detail  BUEs  -TA       MMT (Manual  Muscle Testing)    General MMT Assessment lower extremity strength deficits identified  -MB no strength deficits identified  -TA       General MMT Assessment Detail --   B LE not formally tested d/t sx.  Appear WFL.  -MB BUE formal testing deferred 2/2 spinal px's; grossly WFLs  -TA       Muscle Tone Assessment    Muscle Tone Assessment   Bilateral Lower Extremities  -KG Bilateral Lower Extremities  -KG Bilateral Lower Extremities  -KG    Bilateral Lower Extremities Muscle Tone Assessment   mildly decreased tone  -KG mildly decreased tone  -KG mildly decreased tone  -KG    Bed Mobility, Assessment/Treatment    Bed Mobility, Assistive Device head of bed elevated  -MB        Bed Mobility, Roll Left, [place] supervision required;verbal cues required   log roll technique  -MB supervision required;verbal cues required  -TA       Bed Mobility, Roll Right, [place] independent  -MB independent  -TA       Bed Mob, Sidelying to Sit, [place] supervision required;verbal cues required  -MB supervision required;verbal cues required  -TA       Bed Mob, Sit to Sidelying, [place] supervision required;verbal cues required  -MB supervision required;verbal cues required  -TA       Bed Mobility, Impairments ROM decreased;pain  -MB ROM decreased;pain  -TA       Bed Mobility, Comment VCs for log roll technique  -MB VCs for for log roll technique   -TA       Transfer Assessment/Treatment    Transfers, Sit-Stand [place] supervision required;verbal cues required  -MB supervision required;verbal cues required  -TA       Transfers, Stand-Sit [place] supervision required;verbal cues required  -MB supervision required;verbal cues required  -TA       Transfers, Sit-Stand-Sit, Assist Device rolling walker  -MB rolling walker  -TA       Transfer, Safety Issues --   None noted.  -MB        Transfer, Impairments pain;ROM decreased  -MB pain  -TA       Transfer, Comment VCs for safe hand placement and spinal precautions.   -MB        Functional Mobility    Functional Mobility- Ind. Level  contact guard assist;verbal cues required;1 person + 1 person to manage equipment  -TA       Functional Mobility- Device  rolling walker  -TA       Functional Mobility-Distance (Feet)  400  -TA       Functional Mobility- Safety Issues  balance decreased during turns  -TA       Functional Mobility- Comment  VCs for postural corrections  -TA       Stairs Assessment/Treatment    Number of Stairs 3  -MB        Stairs, Handrail Location both sides  -MB        Stairs, [place] Level contact guard assist;verbal cues required  -MB        Stairs, Impairments ROM decreased;pain  -MB        Stairs, Comment Pt. amb 3 steps x 2 w/ B handrails, CGA, and VCs for sequencing.  -MB Please refer to PT notes  -TA       Lower Body Bathing Assessment/Training    LB Bathing Assess/Train, Comment  OT demo'd use of AE; pt verb'd good understanding  -TA       Lower Body Dressing Assessment/Training    LB Dressing Assess/Train, Clothing Type  donning:;doffing:;slipper socks  -TA       LB Dressing Assess/Train, Assist Device  reacher;sock-aid;long-handled shoe horn  -TA       LB Dressing Assess/Train, Position  sitting  -TA       LB Dressing Assess/Train, [place]  supervision required;verbal cues required  -TA       LB Dressing Assess/Train, Impairments  ROM decreased  -TA       LB Dressing Assess/Train, Comment  Pt demo'd and verb'd good understanding of AE use with LBD  -TA       Toileting Assessment/Training    Toileting Assess/Train, Comment  OT educated pt on post toilet hygiene from front instead of reaching behind to maintain spinal precautions  -TA       Motor Skills/Interventions    Additional Documentation Balance Skills Training (Group)  -MB Balance Skills Training (Group);Fine Motor Coordination Training (Group)  -TA       Balance Skills Training    Training Strategies (Balance Skills)  LBD  -TA       Sitting-Level of Assistance  Independent  -TA        Sitting-Balance Support  Feet supported  -TA       Sitting-Balance Activities  Lateral lean;Reaching for objects;Reaching across midline;Trunk control activities  -TA       Standing-Level of Assistance Contact guard  -MB Contact guard  -TA       Static Standing Balance Support assistive device  -MB assistive device  -TA       Standing-Balance Activities Weight Shift R-L;Weight Shift A-P  -MB Weight Shift A-P;Weight Shift R-L;Reaching for objects  -TA       Gait Balance Support assistive device  -MB        Gait Balance Activities side-stepping;scanning environment R/L  -MB        Therapy Exercises    Bilateral Lower Extremities LAQ;ankle pumps/circles;hip ER  -MB        Fine Motor Coordination Training    Opposition  Right:;Left:;intact;other (comment)   Curtis [id number] intact  -TA       Sensory Assessment/Intervention    Light Touch LLE;RLE  -MB LUE;RUE  -TA       LUE Light Touch  other (see comments)   Pt states intermittent tingling in Curtis hands at times.  -TA       LLE Light Touch mild impairment  -MB        RLE Light Touch WNL  -MB        General Therapy Interventions    Planned Therapy Interventions  adaptive equipment training;ADL retraining;balance training;bed mobility training;strengthening;transfer training  -TA       Positioning and Restraints    Pre-Treatment Position in bed  -MB in bed  -TA       Post Treatment Position bed  -MB bed  -TA       In Bed notified nsg;with OT  -MB supine;call light within reach;encouraged to call for assist;with family/caregiver;side rails up x2;legs elevated   IV intact L hand  -TA         04/21/17 1400 04/21/17 0600 04/20/17 7674          General Information    Equipment Currently Used at Home  cane, straight  -MT none  -MP      Living Environment    Lives With  spouse  -MT spouse  -MP      Living Arrangements  house  -MT house  -MP      Home Accessibility  bed and bath on same level  -MT bed and bath on same level;no concerns  -      Stair Railings at Home  none  -MT  none  -MP      Type of Financial/Environmental Concern  none  -MT none  -MP      Transportation Available  car  -MT car  -MP      Functional Level Prior    Ambulation 0-->independent  -CA 0-->independent  -MT 0-->independent  -MP      Transferring 0-->independent  -CA 0-->independent  -MT 0-->independent  -MP      Toileting 0-->independent  -CA 0-->independent  -MT 0-->independent  -MP      Bathing 0-->independent  -CA 0-->independent  -MT 0-->independent  -MP      Dressing 0-->independent  -CA 0-->independent  -MT 0-->independent  -MP      Eating 0-->independent  -CA 0-->independent  -MT 0-->independent  -MP      Communication 0-->understands/communicates without difficulty  -CA 0-->understands/communicates without difficulty  -MT 0-->understands/communicates without difficulty  -MP      Swallowing 0-->swallows foods/liquids without difficulty  -CA 0-->swallows foods/liquids without difficulty  -MT 0-->swallows foods/liquids without difficulty  -MP        User Key  (r) = Recorded By, (t) = Taken By, (c) = Cosigned By    Initials Name Effective Dates    CA Leonela Ward, RN 06/16/16 -     MT Jessica Gonzalez, RN 06/16/16 -     MARYBETH Fournier, RN 06/16/16 -     LINDSAY Lazar OT 03/14/16 -     TETE Perales, PT 03/14/16 -     KG Adryan Almaguer, ZAC 06/16/16 -            Occupational Therapy Education     Title: PT OT SLP Therapies (Done)     Topic: Occupational Therapy (Done)     Point: ADL training (Done)    Description: Instruct learner(s) on proper safety adaptation and remediation techniques during self care or transfers.   Instruct in proper use of assistive devices.    Learning Progress Summary    Learner Readiness Method Response Comment Documented by Status   Patient Acceptance E,TB,D,H VU,DU,NR ADL kit except leg  issued and training initiated; spinal/sitting precautions/log roll technique; body mechanics with bed mobility/fxl transfers. TA 04/22/17 0921 Done   Significant  Other Acceptance E,TB,D,H VU,DU,NR ADL kit except leg  issued and training initiated; spinal/sitting precautions/log roll technique; body mechanics with bed mobility/fxl transfers. TA 04/22/17 0921 Done               Point: Precautions (Done)    Description: Instruct learner(s) on prescribed precautions during self-care and functional transfers.    Learning Progress Summary    Learner Readiness Method Response Comment Documented by Status   Patient Acceptance E,TB,D,H VU,DU,NR ADL kit except leg  issued and training initiated; spinal/sitting precautions/log roll technique; body mechanics with bed mobility/fxl transfers. TA 04/22/17 0921 Done   Significant Other Acceptance E,TB,D,H VU,DU,NR ADL kit except leg  issued and training initiated; spinal/sitting precautions/log roll technique; body mechanics with bed mobility/fxl transfers. TA 04/22/17 0921 Done               Point: Body mechanics (Done)    Description: Instruct learner(s) on proper positioning and spine alignment during self-care, functional mobility activities and/or exercises.    Learning Progress Summary    Learner Readiness Method Response Comment Documented by Status   Patient Acceptance E,TB,D,H VU,DU,NR ADL kit except leg  issued and training initiated; spinal/sitting precautions/log roll technique; body mechanics with bed mobility/fxl transfers. TA 04/22/17 0921 Done   Significant Other Acceptance E,TB,D,H VU,DU,NR ADL kit except leg  issued and training initiated; spinal/sitting precautions/log roll technique; body mechanics with bed mobility/fxl transfers. TA 04/22/17 0921 Done                      User Key     Initials Effective Dates Name Provider Type Discipline    TA 03/14/16 -  Jd Lazar OT Occupational Therapist OT                  OT Recommendation and Plan  Anticipated Equipment Needs At Discharge: bedside commode, front wheeled walker (ADL kit except leg  issued, training  initiated)  Anticipated Discharge Disposition: home with assist  Planned Therapy Interventions: adaptive equipment training, ADL retraining, balance training, bed mobility training, strengthening, transfer training  Therapy Frequency: daily (Per priority policy)  Plan of Care Review  Plan Of Care Reviewed With: patient, spouse  Outcome Summary/Follow up Plan: Pt presents with fxl decline from PLOF, deficits in ADL performance, fxl mobility, occupational endurance; will benefit from skilled OT services to address deficits, facilitate increased fxl I, safe transition to home with assist.          OT Goals       04/22/17 0923          Bed Mobility OT LTG    Bed Mobility OT LTG, Date Established 04/22/17  -TA      Bed Mobility OT LTG, Time to Achieve 1 wk  -TA      Bed Mobility OT LTG, Activity Type all bed mobility  -TA      Bed Mobility OT LTG, Mitchell Level conditional independence  -TA      Bed Mobility OT LTG, Outcome goal ongoing  -TA      Patient Education OT LTG    Patient Education OT LTG, Date Established 04/22/17  -TA      Patient Education OT LTG, Time to Achieve 1 wk  -TA      Patient Education OT LTG, Education Type precautions per surgeon;posture/body mechanics;adaptive equipment mgmt  -TA      Patient Education OT LTG, Education Understanding verbalizes understanding;demonstrates adequately  -TA      Patient Education OT LTG Outcome goal ongoing  -TA      LB Dressing OT LTG    LB Dressing Goal OT LTG, Date Established 04/22/17  -TA      LB Dressing Goal OT LTG, Time to Achieve 1 wk  -TA      LB Dressing Goal OT LTG, Mitchell Level conditional independence  -TA      LB Dressing Goal OT LTG, Adaptive Equipment --   AE  -TA      LB Dressing Goal OT LTG, Outcome goal ongoing  -TA        User Key  (r) = Recorded By, (t) = Taken By, (c) = Cosigned By    Initials Name Provider Type    LINDSAY Lazar OT Occupational Therapist                Outcome Measures       04/22/17 0819          How much  help from another is currently needed...    Putting on and taking off regular lower body clothing? 3  -TA      Bathing (including washing, rinsing, and drying) 3  -TA      Toileting (which includes using toilet bed pan or urinal) 3  -TA      Putting on and taking off regular upper body clothing 4  -TA      Taking care of personal grooming (such as brushing teeth) 3  -TA      Eating meals 4  -TA      Score 20  -TA      Functional Assessment    Outcome Measure Options AM-PAC 6 Clicks Daily Activity (OT)  -TA        User Key  (r) = Recorded By, (t) = Taken By, (c) = Cosigned By    Initials Name Provider Type    TA Jd Lazar OT Occupational Therapist          Time Calculation:   OT Start Time: 0819 (ttc 8 minutes)    Therapy Charges for Today     Code Description Service Date Service Provider Modifiers Qty    85530304522  OT SELFCARE CURRENT 4/22/2017 DEANNE Mann, CJ 1    [id number]  OT SELFCARE PROJECTED 4/22/2017 Jd Lazar OT GO, CI 1    [id number]  OT EVAL MOD COMPLEXITY 4 4/22/2017 Jd Lazar OT GO 1    [id number]  OT THERAPEUTIC ACT EA 15 MIN 4/22/2017 Jd Lazar OT GO 1          OT G-codes  OT Professional Judgement Used?: Yes  Functional Limitation: Self care  Self Care Current Status (): At least 20 percent but less than 40 percent impaired, limited or restricted  Self Care Goal Status (): At least 1 percent but less than 20 percent impaired, limited or restricted    Jd Lazar OT  4/22/2017     Electronically signed by Jd Lazar OT at 4/22/2017  9:28 AM

## 2017-04-24 NOTE — PROGRESS NOTES
Continued Stay Note  [facility]     Patient Name: Grant Ann  MRN: [id number]  Today's Date: 4/24/2017    Admit Date: 4/21/2017          Discharge Plan       04/24/17 1433    Case Management/Social Work Plan    Plan Post discharge note: Home Health and DME    Additional Comments Faxed DME orders to [employer]. Called [employer] and spoke with Panda at [phone]. Fax was received. They will call when a determination for the home health services is obtained.       04/24/17 1311    Case Management/Social Work Plan    Plan Post discharge note: Home Health    Additional Comments Called Mauro Spaulding, insurance  with [employer] at [phone]. He advised that I fax the home health request to [employer], who would have to issue a precert. This will be handled by a nurse and MD reviewer. Faxed clinical information to [phone]. [employer] can  be reached at [phone].              Discharge Codes     None        Expected Discharge Date and Time     Expected Discharge Date Expected Discharge Time    Apr 22, 2017             Celeste Pitt RN

## 2017-04-24 NOTE — PROGRESS NOTES
Continued Stay Note  [facility]     Patient Name: Grant Ann  MRN: [id number]  Today's Date: 4/24/2017    Admit Date: 4/21/2017          Discharge Plan       04/24/17 1311    Case Management/Social Work Plan    Plan Post discharge note: Home Health    Additional Comments Called Mauro Spaulding, insurance  with [employer] at [phone]. He advised that I fax the home health request to [employer], who would have to issue a precert. This will be handled by a nurse and MD reviewer. Faxed clinical information to [phone]. [employer] can  be reached at [phone].              Discharge Codes     None        Expected Discharge Date and Time     Expected Discharge Date Expected Discharge Time    Apr 22, 2017             Celeste Pitt RN

## 2017-04-27 ENCOUNTER — TELEPHONE (OUTPATIENT)
Dept: NEUROSURGERY | Facility: CLINIC | Age: 62
End: 2017-04-27

## 2017-04-27 NOTE — PROGRESS NOTES
Continued Stay Note  [facility]     Patient Name: Grant Ann  MRN: [id number]  Today's Date: 4/27/2017    Admit Date: 4/21/2017          Discharge Plan       04/27/17 1427    Case Management/Social Work Plan    Plan Home health    Patient/Family In Agreement With Plan yes    Additional Comments [employer] ( for workers comp) has approved Home health nursing visits for incision checks. I contacted [facility] [phone] and spoke with Sammi who accepted referral. They will plan to see on 4/28. I spoke with wife by phone, she is agreeable with plan              Discharge Codes     None        Expected Discharge Date and Time     Expected Discharge Date Expected Discharge Time    Apr 22, 2017             Sonja C Kellerman, RN

## 2017-04-27 NOTE — TELEPHONE ENCOUNTER
Provider:  Kwame  Caller: Sammi adair/ Home Health  Time of call:   08:42 AM  Phone #:  [phone]  Surgery:  LEFT LUMBAR FORAMINOTOMY DISCECTOMY FAR LATERAL L3-4  Surgery Date:  04/21/17  Last visit:   02/23/17  Next visit: 06/05/17    Reason for call:       The patient's assigned home health nurse called in and left vm, said there was some mix up with the home health order so they just got it today.     She wants to verify that Dr. Puente still wants them to go see the patient for incision checks twice a week for 2 weeks.

## 2017-05-10 ENCOUNTER — TELEPHONE (OUTPATIENT)
Dept: NEUROSURGERY | Facility: CLINIC | Age: 62
End: 2017-05-10

## 2017-05-16 RX ORDER — OXYCODONE AND ACETAMINOPHEN 7.5; 325 MG/1; MG/1
1 TABLET ORAL EVERY 6 HOURS PRN
Qty: 60 TABLET | Refills: 0 | Status: SHIPPED | OUTPATIENT
Start: 2017-05-16 | End: 2017-10-02 | Stop reason: SDUPTHER

## 2017-05-16 RX ORDER — OXYCODONE AND ACETAMINOPHEN 10; 325 MG/1; MG/1
1 TABLET ORAL EVERY 6 HOURS PRN
Qty: 60 TABLET | Refills: 0 | Status: CANCELLED | OUTPATIENT
Start: 2017-05-16

## 2017-06-22 ENCOUNTER — OFFICE VISIT (OUTPATIENT)
Dept: NEUROSURGERY | Facility: CLINIC | Age: 62
End: 2017-06-22

## 2017-06-22 VITALS
RESPIRATION RATE: 20 BRPM | WEIGHT: 228 LBS | HEIGHT: 71 IN | BODY MASS INDEX: 31.92 KG/M2 | DIASTOLIC BLOOD PRESSURE: 72 MMHG | TEMPERATURE: 98.1 F | HEART RATE: 76 BPM | SYSTOLIC BLOOD PRESSURE: 138 MMHG

## 2017-06-22 DIAGNOSIS — M51.26 HERNIATED LUMBAR INTERVERTEBRAL DISC: Primary | ICD-10-CM

## 2017-06-22 DIAGNOSIS — M96.1 POST-LAMINECTOMY SYNDROME: ICD-10-CM

## 2017-06-22 PROCEDURE — 99024 POSTOP FOLLOW-UP VISIT: CPT | Performed by: NEUROLOGICAL SURGERY

## 2017-06-22 NOTE — PROGRESS NOTES
Grant Ann  1955  [id number]                       CURRENT WORKING DIAGNOSIS:  Status post discectomy L3-L4, left          MEDICAL HISTORY SINCE LAST ENCOUNTER:  He did well initially and then had a repeat recurrence of symptoms in his leg which is worse when he is upright.  When he is laying down in a recliner he has little discomfort at all.  This is concerning for possibility of recurrence.           Past Medical History:   Diagnosis Date   • Arthritis    • Depression    • Diabetes mellitus     DX. 1992, FSBS 1 X MONTH   • Disease of thyroid gland    • Heartburn    • High cholesterol    • Low back pain    • Recurrent boils    • Wears glasses    • Wears partial dentures               Past Surgical History:   Procedure Laterality Date   • KNEE ARTHROSCOPY W/ MENISCAL REPAIR Right 11/18/2016   • LUMBAR DISCECTOMY Left 4/21/2017    Procedure: LEFT LUMBAR FORAMINOTOMY DISCECTOMY FAR LATERAL L3-4;  Surgeon: Norberto Puente MD;  Location: [facility];  Service:               Family History   Problem Relation Age of Onset   • No Known Problems Mother    • No Known Problems Father               Social History     Social History   • Marital status:      Spouse name: N/A   • Number of children: N/A   • Years of education: N/A     Occupational History   • Not on file.     Social History Main Topics   • Smoking status: Never Smoker   • Smokeless tobacco: Never Used   • Alcohol use No   • Drug use: No   • Sexual activity: Defer     Other Topics Concern   • Not on file     Social History Narrative            No Known Allergies           Current Outpatient Prescriptions:   •  carisoprodol (SOMA) 350 MG tablet, Take 1 tablet by mouth 4 (Four) Times a Day As Needed for Muscle Spasms., Disp: 60 tablet, Rfl: 1  •  citalopram (CeleXA) 20 MG tablet, Take 20 mg by mouth Daily., Disp: , Rfl:   •  glyBURIDE-metFORMIN (GLUCOVANCE) 5-500 MG per tablet, Take 2 tablets by mouth 2 (Two) Times a Day With Meals., Disp: , Rfl:  "  •  levothyroxine (SYNTHROID, LEVOTHROID) 50 MCG tablet, Take 75 mcg by mouth Daily., Disp: , Rfl:   •  meloxicam (MOBIC) 15 MG tablet, Take 15 mg by mouth Daily., Disp: , Rfl:   •  oxyCODONE-acetaminophen (PERCOCET)  MG per tablet, Take 1 tablet by mouth Every 6 (Six) Hours As Needed for Moderate Pain (4-6)., Disp: 60 tablet, Rfl: 0  •  oxyCODONE-acetaminophen (PERCOCET) 7.5-325 MG per tablet, Take 1 tablet by mouth Every 6 (Six) Hours As Needed for Moderate Pain (4-6)., Disp: 60 tablet, Rfl: 0  •  pioglitazone (ACTOS) 30 MG tablet, 30 mg Daily., Disp: , Rfl:   •  simvastatin (ZOCOR) 40 MG tablet, 40 mg Every Night., Disp: , Rfl:          Review of Systems   Musculoskeletal: Positive for back pain.   All other systems reviewed and are negative.              Vitals:    06/22/17 1539   BP: 138/72   Pulse: 76   Resp: 20   Temp: 98.1 °F (36.7 °C)   TempSrc: Oral   Weight: 228 lb (103 kg)   Height: 71\" (180.3 cm)               EXAMINATION: His wound is well-healed.  I'm unable to detect weakness.  The deep tendon reflexes are generally hypoactive related to his diabetes.            MEDICAL DECISION MAKING: His symptoms should be much better; they are not.  This prompts for us to reconsider.  I've ordered a lumbar MRI with and without contrast as well as lumbar spine x-rays with flexion and extension.  My concern is the possibility of instability.  He has severe arthritis in his right knee which tends to \"give out\".  I think the twisting motion that he has me will be aggravating his surgical site.           ASSESSMENT/DISPOSITION: He will return to see me in her [place] office subsequent to the studies on the same day.  We will try to get these expedited.              I APPRECIATE THE OPPORTUNITY OF THIS REFERRAL. PLEASE CALL IF ANY       QUESTIONS [phone]    "

## 2017-08-08 DIAGNOSIS — M51.26 HERNIATED LUMBAR INTERVERTEBRAL DISC: Primary | ICD-10-CM

## 2017-08-08 DIAGNOSIS — M96.1 POST-LAMINECTOMY SYNDROME: ICD-10-CM

## 2017-09-20 DIAGNOSIS — M96.1 POST-LAMINECTOMY SYNDROME: ICD-10-CM

## 2017-09-20 DIAGNOSIS — M51.26 HERNIATED LUMBAR INTERVERTEBRAL DISC: Primary | ICD-10-CM

## 2017-09-25 ENCOUNTER — APPOINTMENT (OUTPATIENT)
Dept: MRI IMAGING | Facility: HOSPITAL | Age: 62
End: 2017-09-25
Attending: NEUROLOGICAL SURGERY

## 2017-10-02 ENCOUNTER — HOSPITAL ENCOUNTER (OUTPATIENT)
Dept: MRI IMAGING | Facility: HOSPITAL | Age: 62
Discharge: HOME | End: 2017-10-02
Attending: NEUROLOGICAL SURGERY | Admitting: NEUROLOGICAL SURGERY

## 2017-10-02 ENCOUNTER — OFFICE VISIT (OUTPATIENT)
Dept: NEUROSURGERY | Facility: CLINIC | Age: 62
End: 2017-10-02

## 2017-10-02 ENCOUNTER — HOSPITAL ENCOUNTER (OUTPATIENT)
Dept: GENERAL RADIOLOGY | Facility: HOSPITAL | Age: 62
Discharge: HOME | End: 2017-10-02
Attending: NEUROLOGICAL SURGERY | Admitting: NEUROLOGICAL SURGERY

## 2017-10-02 VITALS
SYSTOLIC BLOOD PRESSURE: 130 MMHG | DIASTOLIC BLOOD PRESSURE: 70 MMHG | HEIGHT: 71 IN | BODY MASS INDEX: 31.92 KG/M2 | WEIGHT: 228 LBS | TEMPERATURE: 97.4 F

## 2017-10-02 DIAGNOSIS — M51.26 HERNIATED LUMBAR INTERVERTEBRAL DISC: Primary | ICD-10-CM

## 2017-10-02 DIAGNOSIS — M51.26 HERNIATED LUMBAR INTERVERTEBRAL DISC: ICD-10-CM

## 2017-10-02 DIAGNOSIS — M96.1 POST-LAMINECTOMY SYNDROME: ICD-10-CM

## 2017-10-02 DIAGNOSIS — M96.1 POST LAMINECTOMY SYNDROME: ICD-10-CM

## 2017-10-02 PROCEDURE — 82565 ASSAY OF CREATININE: CPT

## 2017-10-02 PROCEDURE — 72120 X-RAY BEND ONLY L-S SPINE: CPT

## 2017-10-02 PROCEDURE — 99213 OFFICE O/P EST LOW 20 MIN: CPT | Performed by: NEUROLOGICAL SURGERY

## 2017-10-02 PROCEDURE — 0 GADOBENATE DIMEGLUMINE 529 MG/ML SOLUTION: Performed by: NEUROLOGICAL SURGERY

## 2017-10-02 PROCEDURE — 72158 MRI LUMBAR SPINE W/O & W/DYE: CPT

## 2017-10-02 PROCEDURE — A9577 INJ MULTIHANCE: HCPCS | Performed by: NEUROLOGICAL SURGERY

## 2017-10-02 RX ORDER — LEVOTHYROXINE SODIUM 0.05 MG/1
50 TABLET ORAL DAILY
COMMUNITY
Start: 2017-09-21

## 2017-10-02 RX ORDER — OXYCODONE AND ACETAMINOPHEN 7.5; 325 MG/1; MG/1
1 TABLET ORAL EVERY 6 HOURS PRN
Qty: 60 TABLET | Refills: 0 | Status: SHIPPED | OUTPATIENT
Start: 2017-10-02 | End: 2018-03-01 | Stop reason: SDUPTHER

## 2017-10-02 RX ORDER — GABAPENTIN 300 MG/1
300 CAPSULE ORAL
Qty: 30 CAPSULE | Refills: 0 | Status: SHIPPED | OUTPATIENT
Start: 2017-10-02 | End: 2017-10-31 | Stop reason: SDUPTHER

## 2017-10-02 RX ADMIN — GADOBENATE DIMEGLUMINE 20 ML: 529 INJECTION, SOLUTION INTRAVENOUS at 14:30

## 2017-10-02 NOTE — PROGRESS NOTES
Grant Ann  1955  [id number]                       CURRENT WORKING DIAGNOSIS:  [Status post discectomy L3-L4, left ]         MEDICAL HISTORY SINCE LAST ENCOUNTER:  Repeat diagnostic studies show postoperative changes with adhesions but no evidence of disc recurrence.  His symptoms remain unchanged with back and right leg pain.           Past Medical History:   Diagnosis Date   • Arthritis    • Depression    • Diabetes mellitus     DX. 1992, FSBS 1 X MONTH   • Disease of thyroid gland    • Heartburn    • High cholesterol    • Low back pain    • Recurrent boils    • Wears glasses    • Wears partial dentures               Past Surgical History:   Procedure Laterality Date   • KNEE ARTHROSCOPY W/ MENISCAL REPAIR Right 11/18/2016   • LUMBAR DISCECTOMY Left 4/21/2017    Procedure: LEFT LUMBAR FORAMINOTOMY DISCECTOMY FAR LATERAL L3-4;  Surgeon: Norberto Puente MD;  Location: [facility];  Service:               Family History   Problem Relation Age of Onset   • No Known Problems Mother    • No Known Problems Father               Social History     Social History   • Marital status:      Spouse name: N/A   • Number of children: N/A   • Years of education: N/A     Occupational History   • Not on file.     Social History Main Topics   • Smoking status: Never Smoker   • Smokeless tobacco: Never Used   • Alcohol use No   • Drug use: No   • Sexual activity: Defer     Other Topics Concern   • Not on file     Social History Narrative            No Known Allergies           Current Outpatient Prescriptions:   •  carisoprodol (SOMA) 350 MG tablet, Take 1 tablet by mouth 4 (Four) Times a Day As Needed for Muscle Spasms., Disp: 60 tablet, Rfl: 1  •  citalopram (CeleXA) 20 MG tablet, Take 20 mg by mouth Daily., Disp: , Rfl:   •  glyBURIDE-metFORMIN (GLUCOVANCE) 5-500 MG per tablet, Take 2 tablets by mouth 2 (Two) Times a Day With Meals., Disp: , Rfl:   •  levothyroxine (SYNTHROID, LEVOTHROID) 75 MCG tablet, Take 75 mcg  by mouth Daily., Disp: , Rfl:   •  meloxicam (MOBIC) 15 MG tablet, Take 15 mg by mouth Daily., Disp: , Rfl:   •  oxyCODONE-acetaminophen (PERCOCET) 7.5-325 MG per tablet, Take 1 tablet by mouth Every 6 (Six) Hours As Needed for Moderate Pain (4-6)., Disp: 60 tablet, Rfl: 0  •  pioglitazone (ACTOS) 30 MG tablet, 30 mg Daily., Disp: , Rfl:   •  simvastatin (ZOCOR) 40 MG tablet, 40 mg Every Night., Disp: , Rfl:   No current facility-administered medications for this visit.          Review of Systems   Constitutional: Negative for activity change, appetite change, chills, diaphoresis, fatigue, fever and unexpected weight change.   HENT: Negative for congestion, dental problem, drooling, ear discharge, ear pain, facial swelling, hearing loss, mouth sores, nosebleeds, postnasal drip, rhinorrhea, sinus pressure, sneezing, sore throat, tinnitus, trouble swallowing and voice change.    Eyes: Negative for photophobia, pain, discharge, redness, itching and visual disturbance.   Respiratory: Negative for apnea, cough, choking, chest tightness, shortness of breath, wheezing and stridor.    Cardiovascular: Negative for chest pain, palpitations and leg swelling.   Gastrointestinal: Negative for abdominal distention, abdominal pain, anal bleeding, blood in stool, constipation, diarrhea, nausea, rectal pain and vomiting.   Endocrine: Negative for cold intolerance, heat intolerance, polydipsia, polyphagia and polyuria.   Genitourinary: Negative for decreased urine volume, difficulty urinating, dysuria, enuresis, flank pain, frequency, genital sores, hematuria and urgency.   Musculoskeletal: Positive for back pain. Negative for arthralgias, gait problem, joint swelling, myalgias, neck pain and neck stiffness.   Skin: Negative for color change, pallor, rash and wound.   Allergic/Immunologic: Negative for environmental allergies, food allergies and immunocompromised state.   Neurological: Negative for dizziness, tremors, seizures,  "syncope, facial asymmetry, speech difficulty, weakness, light-headedness, numbness and headaches.   Hematological: Negative for adenopathy. Does not bruise/bleed easily.   Psychiatric/Behavioral: Negative for agitation, behavioral problems, confusion, decreased concentration, dysphoric mood, hallucinations, self-injury, sleep disturbance and suicidal ideas. The patient is not nervous/anxious and is not hyperactive.    All other systems reviewed and are negative.              Vitals:    10/02/17 1552   Height: 71\" (180.3 cm)               EXAMINATION: He has a diminished patellar reflex on the right side however there is no weakness or sensory loss.            MEDICAL DECISION MAKING: The MRI fails to show an abnormality that would definitively recommend surgical intervention.  There is no evidence of instability and no convincing evidence of recurrence.  Therefore conservativism is the order of the day.           ASSESSMENT/DISPOSITION: I've scheduled for an epidural steroid injection will call me subsequently.  He is unable to work at this time.  Prior to achieving maximal medical improvement he will require a functional capacity evaluation to determine his work capabilities              I APPRECIATE THE OPPORTUNITY OF THIS REFERRAL. PLEASE CALL IF ANY  QUESTIONS [phone]    Scribed for Norberto Puente MD by Pedrito Alves CMA. 10/2/2017  4:34 PM     I have read and concur with the information provided by the scribe.  Norberto Puente MD    "

## 2017-10-02 NOTE — PATIENT INSTRUCTIONS
Call Dr. Puente on a Monday or Tuesday.   Ask for Blank,  and leave a message for  Dr. Puente.  He will call you back at the end of the day as soon as he can.     [phone]

## 2017-10-03 LAB — CREAT BLDA-MCNC: 1.1 MG/DL (ref 0.6–1.3)

## 2017-10-31 DIAGNOSIS — M96.1 POST LAMINECTOMY SYNDROME: Primary | ICD-10-CM

## 2017-10-31 RX ORDER — GABAPENTIN 300 MG/1
CAPSULE ORAL
Qty: 30 CAPSULE | Refills: 0 | OUTPATIENT
Start: 2017-10-31 | End: 2018-07-05

## 2017-10-31 NOTE — TELEPHONE ENCOUNTER
Provider: Kwame  Caller: Awais  Surgery: LEFT LUMBAR FORAMINOTOMY DISCECTOMY FAR LATERAL L3-4  Surgery Date: 04/21/17  Last visit: 10/2/17  Next visit: reta SANTOSPER:         05/16/2017 Carisoprodol 350MG 1955 60 15 Norberto Puente [facility] [address] [place] [address] 1  05/24/2017 Oxycodone/Acetaminophen  325MG/7.5MG  1955 62 [address] [facility] [address] [place] [address] 45 1  08/15/2017 Oxycodone/Acetaminophen  325MG/7.5MG  1955 60 [address] Roque Navarro [facility] [address] [place] [address] 68 1    (no Gabapentin)    Reason for call:           Automated refill request

## 2017-11-29 DIAGNOSIS — M96.1 POST LAMINECTOMY SYNDROME: ICD-10-CM

## 2017-11-29 RX ORDER — GABAPENTIN 300 MG/1
CAPSULE ORAL
Qty: 30 CAPSULE | Refills: 0 | OUTPATIENT
Start: 2017-11-29

## 2017-12-11 ENCOUNTER — TELEPHONE (OUTPATIENT)
Dept: NEUROSURGERY | Facility: CLINIC | Age: 62
End: 2017-12-11

## 2017-12-11 NOTE — TELEPHONE ENCOUNTER
Provider:  Kwame  Caller: Grant Ann  Time of call:   12:17 PM  Phone #:  [phone]  Surgery:  LEFT LUMBAR FORAMINOTOMY DISCECTOMY FAR LATERAL L3-4  Surgery Date:  04/21/17  Last visit:   10/02/17  Next visit: none scheduled    Reason for call:       Pt left vm requesting a call back, no details    Called pt back to get more details. He said that Dr. Puente asked him to call after getting his injections done. ENA with Dr. Bennett last Wednesday, said that he has not noticed any relief of his symptoms at all but that he is hopeful it may get better over the next week or two.

## 2017-12-19 ENCOUNTER — TELEPHONE (OUTPATIENT)
Dept: NEUROSURGERY | Facility: CLINIC | Age: 62
End: 2017-12-19

## 2017-12-19 NOTE — TELEPHONE ENCOUNTER
Please call the patient and ask him to call the physician who gave him his steroid injection and see if he can schedule another. DC

## 2017-12-19 NOTE — TELEPHONE ENCOUNTER
MEDICAL DECISION MAKING: The MRI fails to show an abnormality that would definitively recommend surgical intervention.  There is no evidence of instability and no convincing evidence of recurrence.  Therefore conservativism is the order of the day.             ASSESSMENT/DISPOSITION: I've scheduled for an epidural steroid injection will call me subsequently.  He is unable to work at this time.  Prior to achieving maximal medical improvement he will require a functional capacity evaluation to determine his work capabilities    WB    What do you think? NON surgical/failed ENA.

## 2017-12-19 NOTE — TELEPHONE ENCOUNTER
Provider:  Kwame  Caller: Grant Ann  Time of call:   12:45  Phone #:  [phone]  Surgery:  LEFT LUMBAR FORAMINOTOMY DISCECTOMY FAR LATERAL L3-4  Surgery Date:  04/21/17  Last visit:   10/02/17  Next visit: none scheduled         Reason for call:         Pt left message stating the ENA was not helpful. What is the next option/step?

## 2017-12-19 NOTE — TELEPHONE ENCOUNTER
I discussed with Le Gregg.  Patient should continue with epidural steroid injections.  Sometimes these take more than one attempt to get an efficacious result.  Patient is a nonsurgical candidate and does not have anything else for us to offer him.

## 2017-12-20 NOTE — TELEPHONE ENCOUNTER
Pt states he had injection with . I adv him to get another injection and see if he has any releif with that. I also adv him to give us a call if there is anything else we can do, or if 's office needs clarification from us.  Pt verbalized understanding.

## 2018-01-30 RX ORDER — OXYCODONE AND ACETAMINOPHEN 7.5; 325 MG/1; MG/1
1 TABLET ORAL EVERY 6 HOURS PRN
Qty: 60 TABLET | Refills: 0 | Status: CANCELLED | OUTPATIENT
Start: 2018-01-30

## 2018-01-30 NOTE — TELEPHONE ENCOUNTER
Spoke with Le Gregg PA-C, and she said that if he is still in need of narcotic pain medication, he will need to be scheduled for a follow up appointment with Le or Dr. Puente.

## 2018-01-30 NOTE — TELEPHONE ENCOUNTER
Provider:Kwame    Caller: self  Time of call:     Phone #:  [phone]  Surgery: LEFT LUMBAR FORAMINOTOMY DISCECTOMY FAR LATERAL L3-4    Surgery Date: 4/21/17   Last visit: 10/2/17    Next visit: n/a    REMIGIO:   08/15/2017 Oxycodone/Acetaminophen  325MG/7.5MG  1955 62 10 Roque Navarro [facility] 10- [id number]  [facility] 68 1  10/03/2017 Gabapentin 300MG 1955 30 30 Norberto Puente [facility] 1  10/03/2017 Oxycodone/Acetaminophen  325MG/7.5MG  1955 12 3 [facility] Puente [facility] 45 1  10/24/2017 Oxycodone/Acetaminophen  325MG/7.5MG  1955 62 15 Kwame [facility] 45 1         Reason for call:       Refill request

## 2018-01-31 NOTE — TELEPHONE ENCOUNTER
Called pt - upon speaking to him I reviewed the telephone encounter from 12/19/17- Where we had instructed him to return to  office for another INJ-  Pt had still not done this.    I gave him the # to 's office and instructed him to call them to get in for another.      - no new complaints on his back pain.      Pt stated the W/C case may be coming to an end because they have already stopped his checks, explained to him that if that was the case  office could help him or run him under his own health insurance. - He seen W/C  yesterday 01/30/18 and is currently awaiting report from worker-       -he will give us an update after calling 's office.

## 2018-02-15 ENCOUNTER — TELEPHONE (OUTPATIENT)
Dept: NEUROSURGERY | Facility: CLINIC | Age: 63
End: 2018-02-15

## 2018-03-01 ENCOUNTER — OFFICE VISIT (OUTPATIENT)
Dept: NEUROSURGERY | Facility: CLINIC | Age: 63
End: 2018-03-01

## 2018-03-01 VITALS
BODY MASS INDEX: 32.62 KG/M2 | RESPIRATION RATE: 16 BRPM | DIASTOLIC BLOOD PRESSURE: 82 MMHG | TEMPERATURE: 98.2 F | WEIGHT: 233 LBS | HEART RATE: 68 BPM | HEIGHT: 71 IN | OXYGEN SATURATION: 98 % | SYSTOLIC BLOOD PRESSURE: 138 MMHG

## 2018-03-01 DIAGNOSIS — M96.1 POST LAMINECTOMY SYNDROME: Primary | ICD-10-CM

## 2018-03-01 PROCEDURE — 99213 OFFICE O/P EST LOW 20 MIN: CPT | Performed by: NEUROLOGICAL SURGERY

## 2018-03-01 RX ORDER — OXYCODONE AND ACETAMINOPHEN 7.5; 325 MG/1; MG/1
1 TABLET ORAL EVERY 8 HOURS PRN
Qty: 45 TABLET | Refills: 0 | Status: SHIPPED | OUTPATIENT
Start: 2018-03-01 | End: 2020-11-20

## 2018-03-01 NOTE — PROGRESS NOTES
Grant Ann  1955  [id number]                       CURRENT WORKING DIAGNOSIS:  [Post laminectomy syndrome. ]         MEDICAL HISTORY SINCE LAST ENCOUNTER:  [This is a 62-year-old male who underwent discectomy for disc herniation L3-L4 (left) from which she states he is shown absolutely no improvement.  His symptoms have persisted and remained the same since his surgery 4/21/17.  He has had a lumbar MRI that shows no evidence of recurrent disc.  He is been refractory to all efforts to improve is activities and decrease his pain to no avail. ]           Past Medical History:   Diagnosis Date   • Arthritis    • Depression    • Diabetes mellitus     DX. 1992, FSBS 1 X MONTH   • Disease of thyroid gland    • Heartburn    • High cholesterol    • Low back pain    • Recurrent boils    • Wears glasses    • Wears partial dentures               Past Surgical History:   Procedure Laterality Date   • KNEE ARTHROSCOPY W/ MENISCAL REPAIR Right 11/18/2016   • LUMBAR DISCECTOMY Left 4/21/2017    Procedure: LEFT LUMBAR FORAMINOTOMY DISCECTOMY FAR LATERAL L3-4;  Surgeon: Norberto Puente MD;  Location: [facility];  Service:               Family History   Problem Relation Age of Onset   • No Known Problems Mother    • No Known Problems Father               Social History     Social History   • Marital status:      Spouse name: N/A   • Number of children: N/A   • Years of education: N/A     Occupational History   • Not on file.     Social History Main Topics   • Smoking status: Never Smoker   • Smokeless tobacco: Never Used   • Alcohol use No   • Drug use: No   • Sexual activity: Defer     Other Topics Concern   • Not on file     Social History Narrative            No Known Allergies           Current Outpatient Prescriptions:   •  carisoprodol (SOMA) 350 MG tablet, Take 1 tablet by mouth 4 (Four) Times a Day As Needed for Muscle Spasms., Disp: 60 tablet, Rfl: 1  •  citalopram (CeleXA) 20 MG tablet, Take 40 mg by mouth  "Daily., Disp: , Rfl:   •  gabapentin (NEURONTIN) 300 MG capsule, TAKE 1 CAPSULE BY MOUTH EVERY NIGHT AT BEDTIME, Disp: 30 capsule, Rfl: 0  •  glyBURIDE-metFORMIN (GLUCOVANCE) 5-500 MG per tablet, Take 2 tablets by mouth 2 (Two) Times a Day With Meals., Disp: , Rfl:   •  levothyroxine (SYNTHROID, LEVOTHROID) 75 MCG tablet, Take 75 mcg by mouth Daily., Disp: , Rfl:   •  meloxicam (MOBIC) 15 MG tablet, Take 15 mg by mouth Daily., Disp: , Rfl:   •  oxyCODONE-acetaminophen (PERCOCET) 7.5-325 MG per tablet, Take 1 tablet by mouth Every 6 (Six) Hours As Needed for Moderate Pain ., Disp: 60 tablet, Rfl: 0  •  pioglitazone (ACTOS) 30 MG tablet, 30 mg Daily., Disp: , Rfl:   •  simvastatin (ZOCOR) 40 MG tablet, 40 mg Every Night., Disp: , Rfl:          Review of Systems   Musculoskeletal: Positive for back pain and myalgias.   Neurological: Positive for weakness and numbness.   All other systems reviewed and are negative.              Vitals:    03/01/18 1502   BP: 138/82   BP Location: Left arm   Patient Position: Sitting   Pulse: 68   Resp: 16   Temp: 98.2 °F (36.8 °C)   SpO2: 98%   Weight: 106 kg (233 lb)   Height: 180.3 cm (71\")               EXAMINATION: [ He has a brace on his right knee secondary to injury to his knee.  He is had surgery on his right knee 2 occasions and has considerable amount of pain in that knee.  Straight leg raising is negative.  The left patellar reflex is diminished yet elicitable.  Hip flexion and quadriceps function appears to be within normal limits.]            MEDICAL DECISION MAKING: I really have little else to offer him other than further diagnostic studies which would include myelography, post-myelography CT scanning and EMG and NCV.  His studies have failed to show an abnormality would lend itself to surgical intervention.  Furthermore he is refractory to all conservative efforts.  It is highly unlikely that additional surgery is going to make significant difference in his pain profile " nevertheless I have ordered the studies in order to definitively eliminate anything else that can be done from a neurosurgical perspective.           ASSESSMENT/DISPOSITION: Diagnostic studies have been scheduled.  Continuation of his pain medicine would require referral to pain management.              I APPRECIATE THE OPPORTUNITY OF THIS REFERRAL. PLEASE CALL IF ANY       QUESTIONS [phone]

## 2018-04-20 ENCOUNTER — HOSPITAL ENCOUNTER (OUTPATIENT)
Dept: CT IMAGING | Facility: HOSPITAL | Age: 63
Discharge: HOME | End: 2018-04-20
Admitting: NEUROLOGICAL SURGERY

## 2018-04-20 ENCOUNTER — HOSPITAL ENCOUNTER (OUTPATIENT)
Dept: GENERAL RADIOLOGY | Facility: HOSPITAL | Age: 63
Discharge: HOME | End: 2018-04-20
Attending: NEUROLOGICAL SURGERY | Admitting: NEUROLOGICAL SURGERY

## 2018-04-20 ENCOUNTER — HOSPITAL ENCOUNTER (OUTPATIENT)
Dept: NEUROLOGY | Facility: HOSPITAL | Age: 63
Discharge: HOME | End: 2018-04-20
Attending: NEUROLOGICAL SURGERY

## 2018-04-20 VITALS
WEIGHT: 229 LBS | RESPIRATION RATE: 18 BRPM | OXYGEN SATURATION: 97 % | SYSTOLIC BLOOD PRESSURE: 139 MMHG | BODY MASS INDEX: 31.94 KG/M2 | DIASTOLIC BLOOD PRESSURE: 74 MMHG | TEMPERATURE: 97.6 F | HEART RATE: 62 BPM

## 2018-04-20 DIAGNOSIS — M51.26 LUMBAR HERNIATED DISC: Primary | ICD-10-CM

## 2018-04-20 LAB — GLUCOSE BLDC GLUCOMTR-MCNC: 69 MG/DL (ref 70–130)

## 2018-04-20 PROCEDURE — 0 IOPAMIDOL 41 % SOLUTION

## 2018-04-20 PROCEDURE — 72132 CT LUMBAR SPINE W/DYE: CPT

## 2018-04-20 PROCEDURE — 0 IOPAMIDOL 41 % SOLUTION: Performed by: NEUROLOGICAL SURGERY

## 2018-04-20 PROCEDURE — 72265 MYELOGRAPHY L-S SPINE: CPT

## 2018-04-20 PROCEDURE — 63710000001 DIPHENHYDRAMINE PER 50 MG

## 2018-04-20 PROCEDURE — 82962 GLUCOSE BLOOD TEST: CPT

## 2018-04-20 PROCEDURE — 63710000001 DIPHENHYDRAMINE PER 50 MG: Performed by: NEUROLOGICAL SURGERY

## 2018-04-20 PROCEDURE — 95910 NRV CNDJ TEST 7-8 STUDIES: CPT

## 2018-04-20 PROCEDURE — 95886 MUSC TEST DONE W/N TEST COMP: CPT

## 2018-04-20 RX ORDER — DIAZEPAM 5 MG/1
10 TABLET ORAL
Status: DISCONTINUED | OUTPATIENT
Start: 2018-04-20 | End: 2018-04-21 | Stop reason: HOSPADM

## 2018-04-20 RX ORDER — OXYCODONE AND ACETAMINOPHEN 7.5; 325 MG/1; MG/1
1 TABLET ORAL
Status: COMPLETED | OUTPATIENT
Start: 2018-04-20 | End: 2018-04-20

## 2018-04-20 RX ORDER — DIPHENHYDRAMINE HCL 50 MG
50 CAPSULE ORAL ONCE
Status: COMPLETED | OUTPATIENT
Start: 2018-04-20 | End: 2018-04-20

## 2018-04-20 RX ORDER — DICLOFENAC SODIUM 75 MG/1
75 TABLET, DELAYED RELEASE ORAL 2 TIMES DAILY
Qty: 60 TABLET | Refills: 1 | Status: SHIPPED | OUTPATIENT
Start: 2018-04-20 | End: 2018-05-17 | Stop reason: SDUPTHER

## 2018-04-20 RX ORDER — LIDOCAINE HYDROCHLORIDE 10 MG/ML
5 INJECTION, SOLUTION INFILTRATION; PERINEURAL ONCE
Status: DISCONTINUED | OUTPATIENT
Start: 2018-04-20 | End: 2018-04-20

## 2018-04-20 RX ADMIN — OXYCODONE HYDROCHLORIDE AND ACETAMINOPHEN 1 TABLET: 7.5; 325 TABLET ORAL at 13:00

## 2018-04-20 RX ADMIN — DIPHENHYDRAMINE HYDROCHLORIDE 50 MG: 50 CAPSULE ORAL at 07:45

## 2018-04-20 RX ADMIN — IOPAMIDOL 20 ML: 408 INJECTION, SOLUTION INTRATHECAL at 10:35

## 2018-04-20 RX ADMIN — DIAZEPAM 10 MG: 5 TABLET ORAL at 07:45

## 2018-04-20 RX ADMIN — LIDOCAINE HYDROCHLORIDE 5 ML: 10 INJECTION, SOLUTION INFILTRATION; PERINEURAL at 10:35

## 2018-04-20 NOTE — DISCHARGE INSTR - APPOINTMENTS
You will be called with a date and time to see Dr Farrar. After you have seen him please call Dr Puente and let him know how you are doing.

## 2018-04-20 NOTE — POST-PROCEDURE NOTE
Radiology Procedure    Pre-procedure: procedure, risks discussed with patient. Patient indicated understanding and consented to procedure     Procedure Performed: lumbar myelogram     IV Sedation and/or Anesthesia:  No    Complications: none    Preliminary Findings: pending    Specimen Removed: none    Estimated Blood Loss:  0ml    Post-Procedure Diagnosis: pending    Post-Procedure Plan: ct L spine, encourage fluids, bed rest x 2 hours    Standard Discharge Instructions Given:yes     SAMMY Ayers  04/20/18  10:26 AM

## 2018-04-20 NOTE — POST-PROCEDURE NOTE
Post myelogram note:     This is a 62-year-old male who underwent discectomy for disc herniation L3-L4 (left) from which she states he is shown absolutely no improvement.  His symptoms have persisted and remained the same since his surgery 4/21/17.  He has had a lumbar MRI that shows no evidence of recurrent disc.  He is been refractory to all efforts to improve is activities and decrease his pain to no avail    EMG/NCV IMPRESSION:     Peripheral neuropathy, mild.  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 This has progressed from 2017 exam in regards to nerve root  sleeve effacements at this level    RECOMMEDATION:    The pain is primarily across the low back into his hips.  He does not present with an isolated peripheral nerve root entrapment syndrome.  Furthermore the study shows the degenerative change and a worsening his progressed on the right side where his symptoms are minimal.  He had impression on the left.    Therefore I do not think that surgical intervention is going to be meaningful in help.  I have recommended alteration of NSAIDs.  I will ask him to see Dr. del real for a facet block or perhaps dorsal column stimulator.  Call me subsequently.

## 2018-04-23 ENCOUNTER — TELEPHONE (OUTPATIENT)
Dept: INTERVENTIONAL RADIOLOGY/VASCULAR | Facility: HOSPITAL | Age: 63
End: 2018-04-23

## 2018-04-23 NOTE — TELEPHONE ENCOUNTER
@[id number])@    Other Comments:

## 2018-05-17 RX ORDER — DICLOFENAC SODIUM 75 MG/1
TABLET, DELAYED RELEASE ORAL
Qty: 60 TABLET | Refills: 0 | Status: SHIPPED | OUTPATIENT
Start: 2018-05-17 | End: 2018-06-21 | Stop reason: SDUPTHER

## 2018-05-17 NOTE — TELEPHONE ENCOUNTER
Provider:  Kwame  Pharmacy: [facility]  Surgery:  LEFT LUMBAR FORAMINOTOMY DISCECTOMY FAR LATERAL L3-4  Surgery Date:  04/21/1  Last visit:   03/01/18  Next visit: none scheduled    Reason for call:       Automated refill request from patient's pharmacy

## 2018-06-11 ENCOUNTER — TELEPHONE (OUTPATIENT)
Dept: PAIN MEDICINE | Facility: CLINIC | Age: 63
End: 2018-06-11

## 2018-06-21 DIAGNOSIS — M51.36 DDD (DEGENERATIVE DISC DISEASE), LUMBAR: Primary | ICD-10-CM

## 2018-06-21 RX ORDER — DICLOFENAC SODIUM 75 MG/1
TABLET, DELAYED RELEASE ORAL
Qty: 60 TABLET | Refills: 0 | Status: SHIPPED | OUTPATIENT
Start: 2018-06-21 | End: 2018-07-05

## 2018-06-21 NOTE — TELEPHONE ENCOUNTER
Provider:  Kwame  Pharm[facility]: [facility]  Surgery:  LEFT LUMBAR FORAMINOTOMY DISCECTOMY FAR LATERAL L3-4  Surgery Date:  04/21/1  Last visit:   03/01/18  Next visit: none scheduled- REF TO [facility]AS[facility]O- appt on 07/05/18      -Reason: AUTO RF request on diclofenac

## 2018-07-03 PROBLEM — E66.9 MILD OBESITY: Status: ACTIVE | Noted: 2018-07-03

## 2018-07-03 PROBLEM — E66.9 DIABETES MELLITUS TYPE 2 IN OBESE: Status: ACTIVE | Noted: 2018-07-03

## 2018-07-03 PROBLEM — M96.1 POSTLAMINECTOMY SYNDROME OF LUMBAR REGION: Status: ACTIVE | Noted: 2018-07-03

## 2018-07-03 PROBLEM — E11.69 DIABETES MELLITUS TYPE 2 IN OBESE (HCC): Status: ACTIVE | Noted: 2018-07-03

## 2018-07-04 NOTE — PROGRESS NOTES
"Chief Complaint: \"Pain in my lower back and legs LT>RT.\"    History of Present Illness:   Patient: Mr. Grant Ann, 62 y.o. male   Referring physician: Dr. Norberto Puente   Reason for referral: Consultation for intractable chronic lower back and right knee pain.   Pain history: Patient reports a 2-year history of pain, which began after a work related incident. Pain has progressed in intensity over the past 2 years.   Pain description: constant pain with intermittent exacerbation, described as dull, numbing and sharp sensation.   Radiation of pain: The pain radiates into the posterior aspect of the hip, posterior aspect of the legs, posterior aspect of the knees and posterior aspect of the ankles (LT>RT).  The lower back pain is more severe than the leg pain.  Pain intensity today: 8/10  Average pain intensity last week: 8/10  Pain intensity ranges from: 7/10 to 10/10  Aggravating factors: Pain increases with standing for 5 minutes or ambulating more than 150 feet.  Alleviating factors: Pain decreases with sitting in a recliner.   Associated symptoms:   Patient reports pain, numbness and weakness in the lower extremities.   Patient denies any new bladder or bowel problems.   Patient denies difficulties with his balance or recent falls.      Review of previous therapies and additional medical records:  Grant Ann has already failed the following measures, including:   Conservative measures: oral analgesics, opioids, physical therapy and supervised exercise program   Interventional measures: Lumbar epidural steroid injection 12/06/2017 by Dr. Bennett  Surgical measures: Left Lumbar Foraminotomy discectomy far lateral L3-L4, by Dr. Puente 04/22/2017  Grant Ann underwent neurosurgical consultation with Dr. Puente on 03/01/2018, and was found not to be a surgical candidate.  Grant Ann presents with significant comorbidities including anxiety and depression, engaged in treatment, obesity, hypertension, " non-insulin dependent diabetes, high cholesterol, thyroid disease, osteoarthritis engaged in treatment.  In terms of current analgesics, Grant Ann takes: Percocet, metaxalone, and Celexa  I have reviewed Vladimir Report #[id number] consistent to medication reconciliation.    Global Pain Scale 07-05-18          Pain 20          Feelings 20          Clinical outcomes 25          Activities 25          GPS Total: 90             Review of Diagnostic Studies:    CT Lumbar Spine, 04/20/2018: Sagittal datasets evaluation for bony findings demonstrate persistent minimal retrolisthesis of L5 on S1 along with minimal levoscoliotic curvature of the lower lumbar segments similar to prior seen on coronal datasets evaluation. Vertebral body heights are preserved with intervertebral disc height space loss greatest at the L5-S1 level from degenerative disc disease and desiccation. Anterior osteophyte formation at essentially all levels. Facets are well aligned. Distal spinal cord, conus and cauda equina without contour irregularity. Axial datasets evaluation for soft tissue findings without paraspinal hematoma or soft tissue abnormality of concern. Atherosclerotic nonaneurysmal abdominal aorta is partially visualized. Axial datasets evaluation for degenerative changes with level by level findings as follows:  L1-L2: Moderate circumferential disc bulge along with mild to moderate ligamentum flavum thickening and facet hypertrophy producing trace anterior thecal sac effacement without significant nerve root sleeve effacement.  L2-L3: Moderate to large circumferential disc bulge along with moderate ligamentum flavum thickening and facet hypertrophy producing mild anterior thecal sac effacement with moderate left nerve root sleeve effacement.  L3-L4: Moderate to large circumferential disc bulge along with mild to  moderate ligamentum flavum thickening and facet hypertrophy producing mild anterior thecal sac effacement with moderate  bilateral nerve root sleeve effacement.  L4-L5: Large circumferential disc bulge along with moderate to severe  ligamentum flavum thickening and facet hypertrophy producing moderate thecal sac effacement with moderate to severe bilateral nerve root sleeve effacement.  L5-S1: Large circumferential disc bulge along with mild ligamentum flavum thickening and moderate facet hypertrophy with a right far lateral predominance of the disc bulge involving the right lateral recess. Overall findings produce moderate thecal sac effacement with severe right nerve root sleeve effacement.  IR Myelogram Lumbar Spine, 04/20/2018: There was good filling of the thecal sac. No intrathecal mass was identified. The nerve root sleeves appear bilaterally symmetric. There was no significant indentation, or extrinsic impression on the Thecal sac. Please correlate with CT imaging.   EMG/NCV, 04/20/2018: Peripheral neuropathy, mild. This is an interval development since the previous study of 03/17. Peroneal neuropathy at knee on left, mild-moderate. No electrophysiologic evidence for radiculopathy is seen in either leg.   Xray, Lumbar Spine, 10/02/2017: Images of the lumbar spine are displayed in the lateral projection and in the flexed and extended positions. There are degenerative changes throughout the lumbar distribution. In the lateral projection the alignment is normal and is stable in the flexed and extended positions.     MRI Lumbar Spine, 10/02/2017: vertebral body height is preserved. Disc desiccation seen at the L3/L4 disc space. Postsurgical changes identified in the left paraspinal muscles from left discectomy at the L3/L4 level. There is axial imaging at this level revealing some narrowing of the left neural foramina. There is enhancement identified of the soft tissues suggesting surrounding scar tissue. No definite recurrent disc protrusion present. There is some contact seen of the left nerve root from the scar tissue.  Degenerative changes seen within the posterior facets. Mild-to-moderate narrowing of the central spinal canal. Degenerative changes as well at the L4/L5 and L5/S1 levels. Mild neuroforamina narrowing identified with no central spinal canal stenosis.     Review of Systems   Respiratory: Positive for choking.    Musculoskeletal: Positive for arthralgias, back pain and joint swelling.   Neurological: Positive for weakness and numbness.   Psychiatric/Behavioral: Positive for sleep disturbance. The patient is nervous/anxious (depression).    All other systems reviewed and are negative.     Patient Active Problem List   Diagnosis   • Lumbar herniated disc   • Postlaminectomy syndrome of lumbar region   • Diabetes mellitus type 2 in obese (CMS/HCC)   • Mild obesity   • Anxiety and depression   • Osteoarthritis of right hip       Past Medical History:   Diagnosis Date   • Arthritis    • Depression    • Diabetes mellitus (CMS/HCC)     DX. 1992, FSBS 1 X MONTH   • Disease of thyroid gland    • Heartburn    • High cholesterol    • Low back pain    • Recurrent boils    • Wears glasses    • Wears partial dentures          Past Surgical History:   Procedure Laterality Date   • KNEE ARTHROSCOPY W/ MENISCAL REPAIR Right 11/18/2016   • LUMBAR DISCECTOMY Left 4/21/2017    Procedure: LEFT LUMBAR FORAMINOTOMY DISCECTOMY FAR LATERAL L3-4;  Surgeon: Norberto Puente MD;  Location: [facility];  Service:          Family History   Problem Relation Age of Onset   • No Known Problems Mother    • No Known Problems Father          Social History     Social History   • Marital status:      Social History Main Topics   • Smoking status: Never Smoker   • Smokeless tobacco: Never Used   • Alcohol use No   • Drug use: No   • Sexual activity: Defer     Other Topics Concern   • Not on file        Current Outpatient Prescriptions:   •  citalopram (CeleXA) 20 MG tablet, Take 40 mg by mouth Daily., Disp: , Rfl:   •  glyBURIDE-metFORMIN  "(GLUCOVANCE) 5-500 MG per tablet, Take 2 tablets by mouth 2 (Two) Times a Day With Meals., Disp: , Rfl:   •  levothyroxine (SYNTHROID, LEVOTHROID) 75 MCG tablet, Take 75 mcg by mouth Daily., Disp: , Rfl:   •  METAXALONE PO, Take 800 mg by mouth., Disp: , Rfl:   •  oxyCODONE-acetaminophen (PERCOCET) 7.5-325 MG per tablet, Take 1 tablet by mouth Every 8 (Eight) Hours As Needed for Moderate Pain ., Disp: 45 tablet, Rfl: 0  •  pioglitazone (ACTOS) 30 MG tablet, 30 mg Daily., Disp: , Rfl:   •  simvastatin (ZOCOR) 40 MG tablet, 40 mg Every Night., Disp: , Rfl:       No Known Allergies      /70   Pulse 55   Temp 97.1 °F (36.2 °C) (Temporal Artery )   Resp 18   Ht 180.3 cm (71\")   Wt 103 kg (228 lb)   SpO2 98%   BMI 31.80 kg/m²       Physical Exam:  Constitutional: Patient is oriented to person, place, and time. Vital signs are normal. Patient appears well-developed and well-nourished.   HEENT: Head: Normocephalic and atraumatic. Eyes: Conjunctivae and lids are normal. Pupils: Equal, round, reactive to light.   Neck: Trachea normal. Neck supple. No JVD present.   Pulmonary Respiratory effort: No increased work of breathing or signs of respiratory distress. Auscultation of lungs: Clear to auscultation.   Cardiovascular Auscultation of heart: Normal rate and rhythm, normal S1 and S2, no murmurs.   Abdomen: The abdomen was soft and nontender. Bowel sounds were normal.   Musculoskeletal   Gait and station: Gait evaluation demonstrated an antalgic gait. Patient uses a cane  Lumbar spine: The range of motion of the lumbar spine is limited secondary to pain. Extension, flexion, lateral flexion, rotation of the lumbar spine increased and reproduced pain. Lumbar facet joint loading maneuvers are positive.  Cedric and Gaenslen's tests are negative   Piriformis maneuvers are negative   Palpation of the bilateral ischial tuberosities, unrevealing   Palpation of the bilateral greater trochanter, unrevealing   Examination " of the Iliotibial band: unrevealing   Hip joints: The range of motion of the left hip joint is full and without pain, limited to flexion and internal rotation on the right secondary to pain. Neurological: Patient is alert and oriented to person, place, and time. Speech: speech is normal. Cortical function: Normal mental status.   Cranial nerves: Cranial nerves 2-12 intact.   Reflex Scores:  Right patellar: 1+  Left patellar: 1+  Right achilles: 1+  Left achilles: 1+  Motor strength: 5/5  Motor Tone: normal tone.   Involuntary movements: none.   Superficial/Primitive Reflexes: primitive reflexes were absent.   Right Michelle: absent  Left Michelle: absent  Right ankle clonus: absent  Left ankle clonus: absent   Negative long tract signs. Straight leg raising test is negative. Femoral stretch sign is negative.   Sensation: No sensory loss. Sensory exam: intact to light touch, intact pain and temperature sensation, intact vibration sensation and normal proprioception.   Coordination: Normal finger to nose and heel to shin. Normal balance and negative. Romberg's sign negative.   Skin and subcutaneous tissue: Skin is warm and intact. No rash noted. No cyanosis.   Psychiatric: Judgment and insight: Normal. Orientation to person, place and time: Normal. Recent and remote memory: Intact. Mood and affect: Normal.     ASSESSMENT:   1. Postlaminectomy syndrome of lumbar region    2. Lumbar herniated disc    3. Osteoarthritis of right hip, unspecified osteoarthritis type    4. Diabetes mellitus type 2 in obese (CMS/HCC)    5. Mild obesity    6. Anxiety and depression        PLAN/MEDICAL DECISION MAKING:  I had a lengthy conversation with Mr. Grant Ann regarding his chronic pain condition and potential therapeutic options including risks, benefits, alternative therapies, to name a few. Patient has failed to obtain pain relief with conservative measures and previous surgical intervention, as referenced above. Patient has a  history of left L3-L4 lumbar discectomy for disc herniation without improvement. Symptoms have persisted the same since his surgery on 04/21/2017. Pain has been refractory to all efforts. MRI Lumbar Spine, 10/02/2017: Disc desiccation at the L3/L4 disc space. Axial imaging at this level revealing some narrowing of the left neural foramina and some surrounding scar tissue. No evidence of recurrent disc herniation. There is some contact seen of the left nerve root from the scar tissue. Degenerative changes seen within the posterior facets. Mild-to-moderate narrowing of the central spinal canal. Degenerative changes as well at the L4/L5 and L5/S1 levels. Mild neuroforamina narrowing identified with no central spinal canal stenosis. EMG/NCV: Peripheral neuropathy, mild.  Peroneal neuropathy at knee on left, mild-moderate. No electrophysiologic evidence for radiculopathy is seen in either leg. MYELOGRAM/CT SCAN: Multilevel spondylitic changes greatest at the L5-S1 level with the right lateralizing large disc bulge producing right subarticular recess narrowing as well as moderate thecal sac effacement and severe right and moderate to severe left nerve root sleeve effacement. Patient has been found not to be a surgical candidate. I have reviewed all available patient's medical records as well as previous therapies as referenced above.  Therefore, I have proposed the following plan:  1. Diagnostic studies: CBC, PT, PTT  2. Pharmacological measures: Reviewed. Discussed.   A. Patient takes Percocet, metaxalone, and Celexa. Patient will stop Percocet prior to his SCS trial. He takes Percocet only 3-4 times per week  B. Trial with Rheumate one tablet twice daily  C. Trial with lidocaine 10%, prilocaine 2%, mannitol 20%, capsaicin 0.001%, imipramine 3% cream, apply cream to the affected areas.  3.  Interventional pain management measures: Patient will be scheduled for a spinal cord stimulator trial with [employer]. Patient has  received an educational DVD on spinal cord stimulation therapies.  4. Long-term rehabilitation efforts:  A. The patient does not have a history of falls. I did complete a risk assessment for falls.   B. Patient will start a comprehensive physical therapy program for water therapy, therapeutic exercise, core strengthening, gait and balance training and neurodynamics once pain is under control  C. Start an exercise program such as water therapy  D. Contrast therapy: Apply ice-packs for 15-20 minutes, followed by heating pads for 15-20 minutes to affected area   E. Referral to Dr. Bhavin Arevalo for psychological screening for spinal cord stimulation therapies.  F. Patient has been screened for tobacco use: Current tobacco non-user  5. The patient and his family have been instructed to contact my office with any questions or difficulties. The patient understands the plan and agrees to proceed accordingly.       Patient Care Team:  Kenn Crowder DO as PCP - General (Family Medicine)  Kenn Crowder DO as Referring Physician (Family Medicine)  Norberto Puente MD as Referring Physician (Neurosurgery)  Osmani Farrar MD as Consulting Physician (Pain Medicine)     No orders of the defined types were placed in this encounter.        No future appointments.      Osmani Farrar MD     EMR Dragon/Transcription disclaimer:  Much of this encounter note is an electronic transcription of spoken language to printed text. Electronic transcription of spoken language may permit erroneous, or at times, nonsensical words or phrases to be inadvertently transcribed. Although I have reviewed the note for such errors, some may still exist.

## 2018-07-05 ENCOUNTER — OFFICE VISIT (OUTPATIENT)
Dept: PAIN MEDICINE | Facility: CLINIC | Age: 63
End: 2018-07-05

## 2018-07-05 VITALS
WEIGHT: 228 LBS | HEIGHT: 71 IN | RESPIRATION RATE: 18 BRPM | HEART RATE: 55 BPM | SYSTOLIC BLOOD PRESSURE: 118 MMHG | BODY MASS INDEX: 31.92 KG/M2 | TEMPERATURE: 97.1 F | OXYGEN SATURATION: 98 % | DIASTOLIC BLOOD PRESSURE: 70 MMHG

## 2018-07-05 DIAGNOSIS — M96.1 POSTLAMINECTOMY SYNDROME OF LUMBAR REGION: Primary | ICD-10-CM

## 2018-07-05 DIAGNOSIS — E66.9 DIABETES MELLITUS TYPE 2 IN OBESE (HCC): ICD-10-CM

## 2018-07-05 DIAGNOSIS — F32.A ANXIETY AND DEPRESSION: ICD-10-CM

## 2018-07-05 DIAGNOSIS — F41.9 ANXIETY AND DEPRESSION: ICD-10-CM

## 2018-07-05 DIAGNOSIS — M96.1 POSTLAMINECTOMY SYNDROME OF LUMBAR REGION: ICD-10-CM

## 2018-07-05 DIAGNOSIS — Z01.812 PRE-PROCEDURE LAB EXAM: ICD-10-CM

## 2018-07-05 DIAGNOSIS — M16.11 OSTEOARTHRITIS OF RIGHT HIP, UNSPECIFIED OSTEOARTHRITIS TYPE: ICD-10-CM

## 2018-07-05 DIAGNOSIS — E66.9 MILD OBESITY: ICD-10-CM

## 2018-07-05 DIAGNOSIS — E11.69 DIABETES MELLITUS TYPE 2 IN OBESE (HCC): ICD-10-CM

## 2018-07-05 DIAGNOSIS — M51.26 LUMBAR HERNIATED DISC: ICD-10-CM

## 2018-07-05 PROCEDURE — 99204 OFFICE O/P NEW MOD 45 MIN: CPT | Performed by: ANESTHESIOLOGY

## 2018-07-05 RX ORDER — ME-TETRAHYDROFOLATE/B12/HRB236 1-1-500 MG
1 CAPSULE ORAL 2 TIMES DAILY
Qty: 60 CAPSULE | Refills: 5 | Status: SHIPPED | OUTPATIENT
Start: 2018-07-05 | End: 2020-11-20

## 2018-07-19 DIAGNOSIS — M51.36 DDD (DEGENERATIVE DISC DISEASE), LUMBAR: ICD-10-CM

## 2018-07-19 RX ORDER — DICLOFENAC SODIUM 75 MG/1
TABLET, DELAYED RELEASE ORAL
Qty: 60 TABLET | Refills: 0 | OUTPATIENT
Start: 2018-07-19

## 2018-07-19 NOTE — TELEPHONE ENCOUNTER
Provider:  Kwame  Pharmacy: [facility]  Surgery:  LEFT LUMBAR FORAMINOTOMY DISCECTOMY FAR LATERAL L3-4  Surgery Date:  04/21/17  Last visit:   03/01/18  Next visit: none scheduled    Reason for call:       Automated refill request from patient's pharmacy

## 2018-07-23 DIAGNOSIS — M51.36 DDD (DEGENERATIVE DISC DISEASE), LUMBAR: ICD-10-CM

## 2018-07-24 RX ORDER — DICLOFENAC SODIUM 75 MG/1
TABLET, DELAYED RELEASE ORAL
Qty: 60 TABLET | Refills: 0 | Status: SHIPPED | OUTPATIENT
Start: 2018-07-24 | End: 2018-08-23 | Stop reason: SDUPTHER

## 2018-07-24 NOTE — TELEPHONE ENCOUNTER
Provider:  Kwame  Caller:pharmacy   Time of call:     Phone #:  [phone]  Surgery: LEFT LUMBAR FORAMINOTOMY DISCECTOMY FAR LATERAL L3-4    Surgery Date:  4/21/17  Last visit:  3/1/18   Next visit: JUAN PABLO FLOOD:         Reason for call:         Requested Prescriptions     Pending Prescriptions Disp Refills   • diclofenac (VOLTAREN) 75 MG EC tablet [Pharmacy Med Name: DICLOFENAC SODIUM 75MG DR TABLETS] 60 tablet 0     Sig: TAKE 1 TABLET BY MOUTH TWICE DAILY

## 2018-07-24 NOTE — TELEPHONE ENCOUNTER
Please note that this was discontinued the last time it was requested (7/19).  Was this request coming from the patient?

## 2018-08-23 DIAGNOSIS — M51.36 DDD (DEGENERATIVE DISC DISEASE), LUMBAR: ICD-10-CM

## 2018-08-23 RX ORDER — DICLOFENAC SODIUM 75 MG/1
TABLET, DELAYED RELEASE ORAL
Qty: 60 TABLET | Refills: 0 | Status: SHIPPED | OUTPATIENT
Start: 2018-08-23 | End: 2018-09-19 | Stop reason: SDUPTHER

## 2018-08-23 NOTE — TELEPHONE ENCOUNTER
Provider:  Kwame  Caller: Pharmacy  Time of call:  5879k   Phone #:  [phone]  Surgery:  LEFT LUMBAR FORAMINOTOMY DISCECTOMY FAR LATERAL L3-4    Surgery Date: 4/21/17   Last visit:  3/1/18  Next visit: prn      Reason for call:       Automated rf request for Diclofenac

## 2018-09-19 DIAGNOSIS — M51.36 DDD (DEGENERATIVE DISC DISEASE), LUMBAR: ICD-10-CM

## 2018-09-19 RX ORDER — DICLOFENAC SODIUM 75 MG/1
TABLET, DELAYED RELEASE ORAL
Qty: 60 TABLET | Refills: 0 | Status: SHIPPED | OUTPATIENT
Start: 2018-09-19 | End: 2018-10-19 | Stop reason: SDUPTHER

## 2018-09-19 NOTE — TELEPHONE ENCOUNTER
Provider:    Pharmacy:   Surgery:    Surgery Date:    Last visit:     Next visit:     REMIGIO:     Reason for call:       Automated refill request from patient's pharmacy

## 2018-10-17 DIAGNOSIS — M51.36 DDD (DEGENERATIVE DISC DISEASE), LUMBAR: ICD-10-CM

## 2018-10-17 RX ORDER — DICLOFENAC SODIUM 75 MG/1
TABLET, DELAYED RELEASE ORAL
Qty: 60 TABLET | Refills: 0 | OUTPATIENT
Start: 2018-10-17

## 2018-10-17 NOTE — TELEPHONE ENCOUNTER
Provider: Kwame    Caller: pharmacy    Phone #: [phone]   Surgery:LEFT LUMBAR FORAMINOTOMY DISCECTOMY FAR LATERAL L3-4     Surgery Date:  4/21/17  Last visit:  3/1/18   Next visit: JUAN PABLO FLOOD:         Reason for call:       Requested Prescriptions     Pending Prescriptions Disp Refills   • diclofenac (VOLTAREN) 75 MG EC tablet [Pharmacy Med Name: DICLOFENAC SODIUM 75MG DR TABLETS] 60 tablet 0     Sig: TAKE 1 TABLET BY MOUTH TWICE DAILY

## 2018-10-19 DIAGNOSIS — M51.36 DDD (DEGENERATIVE DISC DISEASE), LUMBAR: ICD-10-CM

## 2018-10-19 RX ORDER — DICLOFENAC SODIUM 75 MG/1
TABLET, DELAYED RELEASE ORAL
Qty: 60 TABLET | Refills: 0 | Status: SHIPPED | OUTPATIENT
Start: 2018-10-19 | End: 2018-11-21 | Stop reason: SDUPTHER

## 2018-10-19 NOTE — TELEPHONE ENCOUNTER
Provider:  Kwame  [facility]: [facility]    Last visit:   3/1/18  Next visit: n/a    REMIGIO:     Reason for call:       Automated refill request from patient's pharmacy

## 2018-11-21 DIAGNOSIS — M96.1 POST LAMINECTOMY SYNDROME: Primary | ICD-10-CM

## 2018-11-21 DIAGNOSIS — M51.36 DDD (DEGENERATIVE DISC DISEASE), LUMBAR: ICD-10-CM

## 2018-11-21 RX ORDER — DICLOFENAC SODIUM 75 MG/1
TABLET, DELAYED RELEASE ORAL
Qty: 60 TABLET | Refills: 0 | Status: SHIPPED | OUTPATIENT
Start: 2018-11-21 | End: 2018-12-17 | Stop reason: SDUPTHER

## 2018-11-21 NOTE — TELEPHONE ENCOUNTER
Provider:  Kwame  Pharmacy: [facility]   Last visit:   3/1/18  Next visit: n/a    Reason for call:         Automated refill request.

## 2018-12-17 DIAGNOSIS — M96.1 POST LAMINECTOMY SYNDROME: ICD-10-CM

## 2018-12-17 RX ORDER — DICLOFENAC SODIUM 75 MG/1
75 TABLET, DELAYED RELEASE ORAL 2 TIMES DAILY
Qty: 60 TABLET | Refills: 0 | Status: SHIPPED | OUTPATIENT
Start: 2018-12-17 | End: 2020-11-20

## 2018-12-17 NOTE — TELEPHONE ENCOUNTER
Provider:  Kwame  Pharmacy: [facility]   Last visit:   3/1/18  Next visit: n/a    Reason for call:         Refill fax request.

## 2019-02-05 RX ORDER — ASPIRIN 81 MG/1
TABLET, COATED ORAL
Qty: 30 TABLET | Refills: 0 | Status: SHIPPED | OUTPATIENT
Start: 2019-02-05 | End: 2019-03-05 | Stop reason: SDUPTHER

## 2019-02-05 NOTE — TELEPHONE ENCOUNTER
Provider:  Kwame  [facility]: [facility]   Last visit:   3/1/18  Next visit: n/a      Auto RF REQ ASA [id number]

## 2019-03-05 RX ORDER — TICAGRELOR 90 MG/1
TABLET ORAL
Qty: 60 TABLET | Refills: 0 | OUTPATIENT
Start: 2019-03-05

## 2019-03-05 RX ORDER — ASPIRIN 81 MG/1
TABLET, COATED ORAL
Qty: 30 TABLET | Refills: 0 | Status: SHIPPED | OUTPATIENT
Start: 2019-03-05

## 2019-03-05 NOTE — TELEPHONE ENCOUNTER
We have asked if he would get this from PCP or cardiologist several times. Could someone check and see if he has done this? Thanks.

## 2019-03-05 NOTE — TELEPHONE ENCOUNTER
Provider: Kwame    Caller: Pharmacy  Phone #:  [phone]  Surgery:  L3-4 Disc   Surgery Date:  4/21/17  Last visit:   3/1/18  Next visit: PRN    Reason for call:         Requested Prescriptions     Pending Prescriptions Disp Refills   • BRILINTA 90 MG tablet tablet [Pharmacy Med Name: BRILINTA 90MG TABLETS] 60 tablet 0     Sig: TAKE 1 TABLET BY MOUTH TWICE DAILY       I don't see that we have prescribed this medication to the pt?

## 2019-03-05 NOTE — TELEPHONE ENCOUNTER
Provider: Kwame   Caller: pharmacy  Time of call:     Phone #:  [phone]  Surgery:LEFT LUMBAR FORAMINOTOMY DISCECTOMY FAR LATERAL L3-4     Surgery Date:  4/17  Last visit:  3/1/18   Next visit:     REMIGIO:         Reason for call:         Requested Prescriptions     Pending Prescriptions Disp Refills   • ASPIRIN LOW DOSE 81 MG EC tablet [Pharmacy Med Name: ASPIRIN 81MG EC LOW DOSE TABLETS] 30 tablet 0     Sig: TAKE 1 TABLET BY MOUTH EVERY DAY

## 2019-03-11 RX ORDER — TICAGRELOR 90 MG/1
TABLET ORAL
Qty: 60 TABLET | Refills: 0 | Status: SHIPPED | OUTPATIENT
Start: 2019-03-11 | End: 2020-11-20

## 2019-03-11 NOTE — TELEPHONE ENCOUNTER
Provider: Kwame   Caller: Pharmacy  Time of call:  1034a  Phone #:  [phone]  Surgery:  Left L3-4 Disc  Surgery Date:  4/21/17  Last visit: 3/1/18    Next visit: PRN    Reason for call:       Requested Prescriptions     Pending Prescriptions Disp Refills   • BRILINTA 90 MG tablet tablet [Pharmacy Med Name: BRILINTA 90MG TABLETS] 60 tablet 0     Sig: TAKE 1 TABLET BY MOUTH TWICE DAILY

## 2019-04-08 RX ORDER — ASPIRIN 81 MG/1
TABLET, COATED ORAL
Qty: 30 TABLET | Refills: 0 | OUTPATIENT
Start: 2019-04-08

## 2019-04-08 RX ORDER — TICAGRELOR 90 MG/1
TABLET ORAL
Qty: 60 TABLET | Refills: 0 | OUTPATIENT
Start: 2019-04-08

## 2019-04-08 NOTE — TELEPHONE ENCOUNTER
Provider: Kwame   Caller: pharmacy  Time of call:     Phone #: [phone]   Surgery: left L3/4  discectomy  Surgery Date: 4/21/17   Last visit:  3/1/18   Next visit: JUAN PABLO FLOOD:         Reason for call:       Requested Prescriptions     Pending Prescriptions Disp Refills   • ASPIRIN LOW DOSE 81 MG EC tablet [Pharmacy Med Name: ASPIRIN 81MG EC LOW DOSE TABLETS] 30 tablet 0     Sig: TAKE 1 TABLET BY MOUTH EVERY DAY   • BRILINTA 90 MG tablet tablet [Pharmacy Med Name: BRILINTA 90MG TABLETS] 60 tablet 0     Sig: TAKE 1 TABLET BY MOUTH TWICE DAILY      Do you want to refill the Brilinta, on 3/5/19 was told to get from cardiologist, them on 3/11 it was approved.

## 2019-05-08 DIAGNOSIS — M96.1 POST LAMINECTOMY SYNDROME: Primary | ICD-10-CM

## 2019-05-08 RX ORDER — TICAGRELOR 90 MG/1
TABLET ORAL
Qty: 60 TABLET | Refills: 0 | OUTPATIENT
Start: 2019-05-08

## 2019-05-08 NOTE — TELEPHONE ENCOUNTER
Provider: Kwame   Caller: pharmacy  Time of call: n/a    Phone #: n/a   Surgery: left L3/4  discectomy  Surgery Date: 4/21/17   Last visit:  3/1/18   Next visit: PRN        Reason for call:         Automated refill request.

## 2019-05-11 DIAGNOSIS — M96.1 POST LAMINECTOMY SYNDROME: Primary | ICD-10-CM

## 2019-05-13 RX ORDER — ASPIRIN 81 MG/1
TABLET, COATED ORAL
Qty: 30 TABLET | Refills: 0 | OUTPATIENT
Start: 2019-05-13

## 2019-05-13 RX ORDER — TICAGRELOR 90 MG/1
TABLET ORAL
Qty: 60 TABLET | Refills: 0 | OUTPATIENT
Start: 2019-05-13

## 2019-05-14 NOTE — TELEPHONE ENCOUNTER
Patient called again requesting we refill his blood thinners.  Patient notified to contact his PCP.

## 2020-10-06 ENCOUNTER — HOSPITAL ENCOUNTER (OUTPATIENT)
Dept: CT IMAGING | Facility: HOSPITAL | Age: 65
Discharge: HOME | End: 2020-10-06
Admitting: INTERNAL MEDICINE

## 2020-10-06 VITALS
HEART RATE: 54 BPM | SYSTOLIC BLOOD PRESSURE: 135 MMHG | HEIGHT: 71 IN | OXYGEN SATURATION: 98 % | WEIGHT: 205 LBS | DIASTOLIC BLOOD PRESSURE: 69 MMHG | BODY MASS INDEX: 28.7 KG/M2 | RESPIRATION RATE: 16 BRPM

## 2020-10-06 DIAGNOSIS — R07.9 CHEST PAIN AT REST: ICD-10-CM

## 2020-10-06 PROCEDURE — 75574 CT ANGIO HRT W/3D IMAGE: CPT

## 2020-10-06 PROCEDURE — 82565 ASSAY OF CREATININE: CPT

## 2020-10-06 PROCEDURE — 0 IOVERSOL 74 % SOLUTION: Performed by: INTERNAL MEDICINE

## 2020-10-06 PROCEDURE — 75574 CT ANGIO HRT W/3D IMAGE: CPT | Performed by: RADIOLOGY

## 2020-10-06 RX ORDER — LIDOCAINE HYDROCHLORIDE 10 MG/ML
5 INJECTION, SOLUTION EPIDURAL; INFILTRATION; INTRACAUDAL; PERINEURAL AS NEEDED
Status: CANCELLED | OUTPATIENT
Start: 2020-10-06

## 2020-10-06 RX ORDER — SODIUM CHLORIDE 0.9 % (FLUSH) 0.9 %
3 SYRINGE (ML) INJECTION EVERY 12 HOURS SCHEDULED
Status: CANCELLED | OUTPATIENT
Start: 2020-10-06

## 2020-10-06 RX ORDER — AMLODIPINE BESYLATE 2.5 MG/1
2.5 TABLET ORAL DAILY
COMMUNITY
End: 2021-01-26 | Stop reason: SDUPTHER

## 2020-10-06 RX ORDER — RANOLAZINE 500 MG/1
500 TABLET, EXTENDED RELEASE ORAL 2 TIMES DAILY
COMMUNITY
End: 2020-11-20

## 2020-10-06 RX ORDER — METOPROLOL TARTRATE 100 MG/1
100 TABLET ORAL ONCE AS NEEDED
Status: CANCELLED | OUTPATIENT
Start: 2020-10-06

## 2020-10-06 RX ORDER — CLOPIDOGREL BISULFATE 75 MG/1
75 TABLET ORAL DAILY
COMMUNITY
End: 2021-01-26 | Stop reason: SDUPTHER

## 2020-10-06 RX ORDER — SODIUM CHLORIDE 0.9 % (FLUSH) 0.9 %
10 SYRINGE (ML) INJECTION AS NEEDED
Status: CANCELLED | OUTPATIENT
Start: 2020-10-06

## 2020-10-06 RX ORDER — METOPROLOL TARTRATE 50 MG/1
50 TABLET, FILM COATED ORAL ONCE AS NEEDED
Status: CANCELLED | OUTPATIENT
Start: 2020-10-06

## 2020-10-06 RX ORDER — NITROGLYCERIN 0.4 MG/1
0.4 TABLET SUBLINGUAL
Status: CANCELLED | OUTPATIENT
Start: 2020-10-06 | End: 2020-10-06

## 2020-10-06 RX ORDER — GLIPIZIDE 5 MG/1
10 TABLET ORAL
COMMUNITY

## 2020-10-06 RX ORDER — ISOSORBIDE MONONITRATE 30 MG/1
30 TABLET, EXTENDED RELEASE ORAL DAILY
COMMUNITY
End: 2021-01-26 | Stop reason: SDUPTHER

## 2020-10-06 RX ORDER — INSULIN GLARGINE 100 [IU]/ML
34 INJECTION, SOLUTION SUBCUTANEOUS DAILY
COMMUNITY

## 2020-10-06 RX ADMIN — IOVERSOL 100 ML: 741 INJECTION INTRA-ARTERIAL; INTRAVENOUS at 10:18

## 2020-10-07 LAB — CREAT BLDA-MCNC: 1.7 MG/DL (ref 0.6–1.3)

## 2020-11-19 PROBLEM — K21.9 GERD (GASTROESOPHAGEAL REFLUX DISEASE): Status: ACTIVE | Noted: 2020-11-19

## 2020-11-19 PROBLEM — E03.9 HYPOTHYROID: Status: ACTIVE | Noted: 2020-11-19

## 2020-11-19 PROBLEM — I25.10 CAD (CORONARY ARTERY DISEASE): Status: ACTIVE | Noted: 2020-11-19

## 2020-11-19 PROBLEM — I10 ESSENTIAL HYPERTENSION: Status: ACTIVE | Noted: 2020-11-19

## 2020-11-19 PROBLEM — I25.118 CORONARY ARTERY DISEASE OF NATIVE ARTERY OF NATIVE HEART WITH STABLE ANGINA PECTORIS (HCC): Status: ACTIVE | Noted: 2020-11-19

## 2020-11-19 PROBLEM — E78.2 MIXED HYPERLIPIDEMIA: Status: ACTIVE | Noted: 2020-11-19

## 2020-11-20 ENCOUNTER — CONSULT (OUTPATIENT)
Dept: CARDIOLOGY | Facility: CLINIC | Age: 65
End: 2020-11-20

## 2020-11-20 VITALS
DIASTOLIC BLOOD PRESSURE: 60 MMHG | HEIGHT: 71 IN | WEIGHT: 211 LBS | SYSTOLIC BLOOD PRESSURE: 122 MMHG | HEART RATE: 59 BPM | BODY MASS INDEX: 29.54 KG/M2

## 2020-11-20 DIAGNOSIS — E78.2 MIXED HYPERLIPIDEMIA: ICD-10-CM

## 2020-11-20 DIAGNOSIS — I25.110 CORONARY ARTERY DISEASE INVOLVING NATIVE CORONARY ARTERY OF NATIVE HEART WITH UNSTABLE ANGINA PECTORIS (HCC): Primary | ICD-10-CM

## 2020-11-20 DIAGNOSIS — I10 ESSENTIAL HYPERTENSION: ICD-10-CM

## 2020-11-20 PROCEDURE — 93000 ELECTROCARDIOGRAM COMPLETE: CPT | Performed by: INTERNAL MEDICINE

## 2020-11-20 PROCEDURE — 99204 OFFICE O/P NEW MOD 45 MIN: CPT | Performed by: INTERNAL MEDICINE

## 2020-11-20 RX ORDER — RANOLAZINE 1000 MG/1
1000 TABLET, EXTENDED RELEASE ORAL EVERY 12 HOURS SCHEDULED
Qty: 60 TABLET | Refills: 3 | Status: SHIPPED | OUTPATIENT
Start: 2020-11-20 | End: 2021-01-26 | Stop reason: SDUPTHER

## 2020-11-20 RX ORDER — NITROGLYCERIN 0.4 MG/1
0.4 TABLET SUBLINGUAL
COMMUNITY

## 2020-11-20 RX ORDER — ATORVASTATIN CALCIUM 40 MG/1
40 TABLET, FILM COATED ORAL DAILY
Status: ON HOLD | COMMUNITY
End: 2020-12-02 | Stop reason: SDUPTHER

## 2020-11-20 NOTE — PROGRESS NOTES
Subjective   Grant nAn is a 65 y.o. male.  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 Extensive plaque involving the LAD with probable long segment high-grade stenosis although stenosis measurement limited by presence of extensive calcified plaque. LVEF: 69%.   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  He underwent cardiac catheterization at [facility] in 2018 and received a stent to the LAD.  This past June he had a myocardial perfusion study which was abnormal showing small inferior ischemia.  This was followed by a CTA  coronary study which shows extensive plaque involving the LAD.  He was evaluated by his cardiologist in [place] advised to have cardiac catheterization at which time he asked to be referred to our service for further evaluation.  He is having chest pain on a daily basis with or without exertion which may last 10 to 15 minutes but requires a single sublingual nitroglycerin at least 3 times per week.  He has associated shortness of breath but denies nausea, he has diaphoresis and radiating pain.  He states his blood pressures at home typically run about 120 mmHg systolic.  His last lipid panel was approximately 6 months ago and reported to show a total cholesterol of 120.  He denies orthopnea, PND, claudication, lower extremity edema.  He has no awareness of tachyarrhythmias, denies dizziness or syncope.  He has known stage III kidney disease and sees .     Past Surgical History:   Procedure Laterality Date   • CARDIAC CATHETERIZATION     • KNEE ARTHROSCOPY     • KNEE ARTHROSCOPY W/ MENISCAL REPAIR Right 11/18/2016   • LUMBAR DISCECTOMY Left 4/21/2017    Procedure: LEFT LUMBAR FORAMINOTOMY DISCECTOMY FAR LATERAL L3-4;  Surgeon: Norberto Puente MD;  Location: [facility];  Service:        The following portions of the patient's history were reviewed and updated as appropriate: allergies, current medications, past family history, past medical history, past social history, past surgical history and problem list.    No Known Allergies      Current Outpatient Medications   Medication Instructions   • amLODIPine (NORVASC) 2.5 mg, Oral, Daily   • ASPIRIN LOW DOSE 81 MG EC tablet TAKE 1 TABLET BY MOUTH EVERY DAY   • atorvastatin (LIPITOR) 40 mg, Oral, Daily   • citalopram (CELEXA) 40 mg, Oral, Daily   • clopidogrel (PLAVIX) 75 mg, Oral, Daily   • glipizide (GLUCOTROL) 10 mg, Oral, 2 Times Daily Before Meals   • insulin glargine (LANTUS) 27 Units, Subcutaneous, Daily   • isosorbide mononitrate (IMDUR) 30 mg, Oral, Daily  "  • levothyroxine (SYNTHROID, LEVOTHROID) 50 mcg, Oral, Daily   • nitroglycerin (NITROSTAT) 0.4 mg, Sublingual, Every 5 Minutes PRN, Take no more than 3 doses in 15 minutes.    • ranolazine (RANEXA) 500 mg, Oral, 2 Times Daily         Review of Systems   Constitution: Positive for malaise/fatigue.   HENT: Positive for hearing loss.    Eyes: Negative.    Cardiovascular: Positive for chest pain.   Respiratory: Negative.    Endocrine: Negative.    Hematologic/Lymphatic: Negative.    Skin: Negative.    Musculoskeletal: Positive for arthritis and muscle weakness.   Gastrointestinal: Negative.    Genitourinary: Negative.    Neurological: Negative.    Psychiatric/Behavioral: The patient is nervous/anxious.    Allergic/Immunologic: Negative.    All other systems reviewed and are negative.      Social History     Socioeconomic History   • Marital status:      Spouse name: Not on file   • Number of children: Not on file   • Years of education: Not on file   • Highest education level: Not on file   Tobacco Use   • Smoking status: Never Smoker   • Smokeless tobacco: Never Used   Substance and Sexual Activity   • Alcohol use: No   • Drug use: No   • Sexual activity: Defer       Family History   Problem Relation Age of Onset   • No Known Problems Mother    • Heart disease Father    • Heart failure Father        Objective      /60 (BP Location: Left arm, Patient Position: Sitting)   Pulse 59   Ht 180.3 cm (71\")   Wt 95.7 kg (211 lb)   BMI 29.43 kg/m²     Vitals signs reviewed.   Constitutional:       Appearance: Healthy appearance. Well-developed and not in distress.   Eyes:      General: No scleral icterus.     Conjunctiva/sclera: Conjunctivae normal.      Pupils: Pupils are equal, round, and reactive to light.   HENT:      Head: Normocephalic and atraumatic.   Neck:      Musculoskeletal: Neck supple.      Thyroid: No thyromegaly.      Vascular: No carotid bruit or JVD.      Lymphadenopathy: No cervical " adenopathy.   Pulmonary:      Effort: Pulmonary effort is normal.      Breath sounds: Normal breath sounds and air entry. No stridor. No wheezing. No rales.   Cardiovascular:      PMI at left midclavicular line. Normal rate. Regular rhythm. Normal S1. Normal S2.      Murmurs: There is no murmur.      No gallop. No click. No rub.   Pulses:     Intact distal pulses.   Edema:     Peripheral edema absent.   Abdominal:      General: Bowel sounds are normal. There is no distension.      Palpations: Abdomen is soft. There is no abdominal mass.      Tenderness: There is no abdominal tenderness.   Musculoskeletal: Normal range of motion.         General: No tenderness or deformity.   Skin:     General: Skin is warm and dry.      Findings: No erythema or rash.   Neurological:      Mental Status: Alert, oriented to person, place, and time and oriented to person, place and time.      Cranial Nerves: Cranial nerves are intact. No cranial nerve deficit.      Motor: Motor function is intact.      Coordination: Coordination normal.   Psychiatric:         Mood and Affect: Mood and affect normal.           ECG 12 Lead    Date/Time: 11/19/2020 7:05 PM  Performed by: Casper Stoll MD  Authorized by: Casper Stoll MD   Previous ECG: no previous ECG available  Rhythm: sinus bradycardia  Rate: bradycardic  BPM: 59  Conduction: conduction normal  ST Segments: ST segments normal  T Waves: T waves normal  QRS axis: normal  Other: no other findings    Clinical impression: normal ECG          Lab Review:     [facility] 1.7 10-6-2020    Assessment:   Diagnosis Plan   1. Coronary artery disease involving native coronary artery of native heart with unstable angina pectoris (CMS/HCC)  ECG 12 Lead  Increase Ranexa to 1000 mg twice daily    Case Request Cath Lab: Left Heart Cath   2. Essential hypertension  Well managed,  continue current therapy.     3. Mixed hyperlipidemia   on statin therapy.  This will be rechecked at the time of his cardiac  catheterization.      Plan:  Patient with known CAD and previous stent.  He has been experiencing frequent angina and had an abnormal myocardial perfusion study as well as's coronary CTA suggesting progression of coronary disease.  He continues to experience progressive angina despite optimal medical therapy.  He is not a suitable candidate for beta-blockers due to resting bradycardia.    At this time we have recommended increasing the dose of Ranexa to 1000 mg twice daily.  We also recommended cardiac catheterization study for further evaluation to be followed by catheter-based intervention depending on findings.    The procedure was explained to the patient/family extensively. Indications, benefits, risks and alternatives were discussed. The patient understands well, and wishes to proceed.  This will be scheduled in near future.  Continue all other current medications for now.  Follow-up after the procedure.   Thank you for allowing us to participate in the care of your patient.     Scribed for Casper Stoll MD by Electronically signed by Electronically signed by SAMMY Kaur, 11/20/20, 12:14 PM EST.    ICasper MD, personally performed the services described in this documentation as scribed by the above named individual in my presence, and it is both accurate and complete.  11/20/2020  17:02 EST

## 2020-11-25 ENCOUNTER — PREP FOR SURGERY (OUTPATIENT)
Dept: OTHER | Facility: HOSPITAL | Age: 65
End: 2020-11-25

## 2020-11-25 DIAGNOSIS — I25.110 CORONARY ARTERY DISEASE INVOLVING NATIVE CORONARY ARTERY OF NATIVE HEART WITH UNSTABLE ANGINA PECTORIS (HCC): Primary | ICD-10-CM

## 2020-11-25 RX ORDER — ASPIRIN 325 MG
325 TABLET, DELAYED RELEASE (ENTERIC COATED) ORAL DAILY
Status: CANCELLED | OUTPATIENT
Start: 2020-11-26

## 2020-11-25 RX ORDER — ASPIRIN 325 MG
325 TABLET ORAL ONCE
Status: CANCELLED | OUTPATIENT
Start: 2020-11-25 | End: 2020-11-25

## 2020-11-25 RX ORDER — SODIUM CHLORIDE 0.9 % (FLUSH) 0.9 %
10 SYRINGE (ML) INJECTION AS NEEDED
Status: CANCELLED | OUTPATIENT
Start: 2020-11-25

## 2020-11-25 RX ORDER — ONDANSETRON 2 MG/ML
4 INJECTION INTRAMUSCULAR; INTRAVENOUS EVERY 8 HOURS PRN
Status: CANCELLED | OUTPATIENT
Start: 2020-11-25

## 2020-11-25 RX ORDER — SODIUM CHLORIDE 0.9 % (FLUSH) 0.9 %
3 SYRINGE (ML) INJECTION EVERY 12 HOURS SCHEDULED
Status: CANCELLED | OUTPATIENT
Start: 2020-11-25

## 2020-11-25 RX ORDER — NITROGLYCERIN 0.4 MG/1
0.4 TABLET SUBLINGUAL
Status: CANCELLED | OUTPATIENT
Start: 2020-11-25

## 2020-11-27 ENCOUNTER — TRANSCRIBE ORDERS (OUTPATIENT)
Dept: ADMINISTRATIVE | Facility: HOSPITAL | Age: 65
End: 2020-11-27

## 2020-11-27 DIAGNOSIS — Z01.818 PRE-OPERATIVE CLEARANCE: Primary | ICD-10-CM

## 2020-11-30 ENCOUNTER — LAB (OUTPATIENT)
Dept: LAB | Facility: HOSPITAL | Age: 65
End: 2020-11-30

## 2020-11-30 DIAGNOSIS — Z01.818 PRE-OPERATIVE CLEARANCE: ICD-10-CM

## 2020-11-30 PROCEDURE — U0004 COV-19 TEST NON-CDC HGH THRU: HCPCS | Performed by: INTERNAL MEDICINE

## 2020-11-30 PROCEDURE — C9803 HOPD COVID-19 SPEC COLLECT: HCPCS

## 2020-12-01 LAB — SARS-COV-2 RNA RESP QL NAA+PROBE: NOT DETECTED

## 2020-12-02 ENCOUNTER — HOSPITAL ENCOUNTER (OUTPATIENT)
Facility: HOSPITAL | Age: 65
Discharge: HOME | End: 2020-12-02
Attending: INTERNAL MEDICINE | Admitting: INTERNAL MEDICINE

## 2020-12-02 VITALS
RESPIRATION RATE: 16 BRPM | SYSTOLIC BLOOD PRESSURE: 117 MMHG | HEART RATE: 50 BPM | DIASTOLIC BLOOD PRESSURE: 79 MMHG | HEIGHT: 71 IN | BODY MASS INDEX: 29.9 KG/M2 | OXYGEN SATURATION: 95 % | TEMPERATURE: 97.1 F | WEIGHT: 213.6 LBS

## 2020-12-02 DIAGNOSIS — I25.110 CORONARY ARTERY DISEASE INVOLVING NATIVE CORONARY ARTERY OF NATIVE HEART WITH UNSTABLE ANGINA PECTORIS (HCC): ICD-10-CM

## 2020-12-02 LAB
ALBUMIN SERPL-MCNC: 4.2 G/DL (ref 3.5–5.2)
ALBUMIN/GLOB SERPL: 1.9 G/DL
ALP SERPL-CCNC: 80 U/L (ref 39–117)
ALT SERPL W P-5'-P-CCNC: 24 U/L (ref 1–41)
ANION GAP SERPL CALCULATED.3IONS-SCNC: 10 MMOL/L (ref 5–15)
AST SERPL-CCNC: 21 U/L (ref 1–40)
BASE EXCESS BLDA CALC-SCNC: 1 MMOL/L (ref -5–5)
BASOPHILS # BLD AUTO: 0.04 10*3/MM3 (ref 0–0.2)
BASOPHILS NFR BLD AUTO: 0.6 % (ref 0–1.5)
BILIRUB SERPL-MCNC: 0.4 MG/DL (ref 0–1.2)
BUN SERPL-MCNC: 20 MG/DL (ref 8–23)
BUN/CREAT SERPL: 12.7 (ref 7–25)
CA-I BLDA-SCNC: 1.23 MMOL/L (ref 1.2–1.32)
CALCIUM SPEC-SCNC: 9.3 MG/DL (ref 8.6–10.5)
CHLORIDE SERPL-SCNC: 102 MMOL/L (ref 98–107)
CHOLEST SERPL-MCNC: 155 MG/DL (ref 0–200)
CO2 BLDA-SCNC: 28 MMOL/L (ref 24–29)
CO2 SERPL-SCNC: 26 MMOL/L (ref 22–29)
CREAT BLDA-MCNC: 1.6 MG/DL (ref 0.6–1.3)
CREAT SERPL-MCNC: 1.57 MG/DL (ref 0.76–1.27)
DEPRECATED RDW RBC AUTO: 42.5 FL (ref 37–54)
EOSINOPHIL # BLD AUTO: 0.22 10*3/MM3 (ref 0–0.4)
EOSINOPHIL NFR BLD AUTO: 3.2 % (ref 0.3–6.2)
ERYTHROCYTE [DISTWIDTH] IN BLOOD BY AUTOMATED COUNT: 11.9 % (ref 12.3–15.4)
GFR SERPL CREATININE-BSD FRML MDRD: 45 ML/MIN/1.73
GLOBULIN UR ELPH-MCNC: 2.2 GM/DL
GLUCOSE BLDC GLUCOMTR-MCNC: 179 MG/DL (ref 70–130)
GLUCOSE SERPL-MCNC: 181 MG/DL (ref 65–99)
HBA1C MFR BLD: 9.1 % (ref 4.8–5.6)
HCO3 BLDA-SCNC: 26.3 MMOL/L (ref 22–26)
HCT VFR BLD AUTO: 39.7 % (ref 37.5–51)
HCT VFR BLDA CALC: 39 % (ref 38–51)
HDLC SERPL-MCNC: 46 MG/DL (ref 40–60)
HGB BLD-MCNC: 13.5 G/DL (ref 13–17.7)
HGB BLDA-MCNC: 13.3 G/DL (ref 12–17)
IMM GRANULOCYTES # BLD AUTO: 0.03 10*3/MM3 (ref 0–0.05)
IMM GRANULOCYTES NFR BLD AUTO: 0.4 % (ref 0–0.5)
LDLC SERPL CALC-MCNC: 92 MG/DL (ref 0–100)
LDLC/HDLC SERPL: 1.98 {RATIO}
LYMPHOCYTES # BLD AUTO: 1.7 10*3/MM3 (ref 0.7–3.1)
LYMPHOCYTES NFR BLD AUTO: 24.8 % (ref 19.6–45.3)
MCH RBC QN AUTO: 32.8 PG (ref 26.6–33)
MCHC RBC AUTO-ENTMCNC: 34 G/DL (ref 31.5–35.7)
MCV RBC AUTO: 96.6 FL (ref 79–97)
MONOCYTES # BLD AUTO: 0.71 10*3/MM3 (ref 0.1–0.9)
MONOCYTES NFR BLD AUTO: 10.4 % (ref 5–12)
NEUTROPHILS NFR BLD AUTO: 4.15 10*3/MM3 (ref 1.7–7)
NEUTROPHILS NFR BLD AUTO: 60.6 % (ref 42.7–76)
NRBC BLD AUTO-RTO: 0 /100 WBC (ref 0–0.2)
PCO2 BLDA: 47.3 MM HG (ref 35–45)
PH BLDA: 7.35 PH UNITS (ref 7.35–7.6)
PLATELET # BLD AUTO: 194 10*3/MM3 (ref 140–450)
PMV BLD AUTO: 10.7 FL (ref 6–12)
PO2 BLDA: 46 MMHG (ref 80–105)
POTASSIUM BLDA-SCNC: 4.3 MMOL/L (ref 3.5–4.9)
POTASSIUM SERPL-SCNC: 4.3 MMOL/L (ref 3.5–5.2)
PROT SERPL-MCNC: 6.4 G/DL (ref 6–8.5)
RBC # BLD AUTO: 4.11 10*6/MM3 (ref 4.14–5.8)
SAO2 % BLDA: 79 % (ref 95–98)
SODIUM BLD-SCNC: 139 MMOL/L (ref 138–146)
SODIUM SERPL-SCNC: 138 MMOL/L (ref 136–145)
TRIGL SERPL-MCNC: 89 MG/DL (ref 0–150)
VLDLC SERPL-MCNC: 17 MG/DL (ref 5–40)
WBC # BLD AUTO: 6.85 10*3/MM3 (ref 3.4–10.8)

## 2020-12-02 PROCEDURE — 25010000002 HEPARIN (PORCINE) PER 1000 UNITS: Performed by: INTERNAL MEDICINE

## 2020-12-02 PROCEDURE — 80053 COMPREHEN METABOLIC PANEL: CPT | Performed by: INTERNAL MEDICINE

## 2020-12-02 PROCEDURE — 93458 L HRT ARTERY/VENTRICLE ANGIO: CPT | Performed by: INTERNAL MEDICINE

## 2020-12-02 PROCEDURE — 82803 BLOOD GASES ANY COMBINATION: CPT

## 2020-12-02 PROCEDURE — 82947 ASSAY GLUCOSE BLOOD QUANT: CPT

## 2020-12-02 PROCEDURE — 25010000002 MIDAZOLAM PER 1 MG: Performed by: INTERNAL MEDICINE

## 2020-12-02 PROCEDURE — 83036 HEMOGLOBIN GLYCOSYLATED A1C: CPT | Performed by: PHYSICIAN ASSISTANT

## 2020-12-02 PROCEDURE — 80061 LIPID PANEL: CPT | Performed by: PHYSICIAN ASSISTANT

## 2020-12-02 PROCEDURE — C1894 INTRO/SHEATH, NON-LASER: HCPCS | Performed by: INTERNAL MEDICINE

## 2020-12-02 PROCEDURE — 82565 ASSAY OF CREATININE: CPT

## 2020-12-02 PROCEDURE — 82330 ASSAY OF CALCIUM: CPT

## 2020-12-02 PROCEDURE — 85014 HEMATOCRIT: CPT

## 2020-12-02 PROCEDURE — 0 IOPAMIDOL PER 1 ML: Performed by: INTERNAL MEDICINE

## 2020-12-02 PROCEDURE — 85025 COMPLETE CBC W/AUTO DIFF WBC: CPT | Performed by: INTERNAL MEDICINE

## 2020-12-02 PROCEDURE — 84132 ASSAY OF SERUM POTASSIUM: CPT

## 2020-12-02 PROCEDURE — C1769 GUIDE WIRE: HCPCS | Performed by: INTERNAL MEDICINE

## 2020-12-02 PROCEDURE — 84295 ASSAY OF SERUM SODIUM: CPT

## 2020-12-02 RX ORDER — LIDOCAINE HYDROCHLORIDE 10 MG/ML
INJECTION, SOLUTION EPIDURAL; INFILTRATION; INTRACAUDAL; PERINEURAL AS NEEDED
Status: DISCONTINUED | OUTPATIENT
Start: 2020-12-02 | End: 2020-12-02 | Stop reason: HOSPADM

## 2020-12-02 RX ORDER — NITROGLYCERIN 0.4 MG/1
0.4 TABLET SUBLINGUAL
Status: DISCONTINUED | OUTPATIENT
Start: 2020-12-02 | End: 2020-12-02 | Stop reason: HOSPADM

## 2020-12-02 RX ORDER — SODIUM CHLORIDE 9 MG/ML
125 INJECTION, SOLUTION INTRAVENOUS CONTINUOUS
Status: DISCONTINUED | OUTPATIENT
Start: 2020-12-02 | End: 2020-12-02 | Stop reason: HOSPADM

## 2020-12-02 RX ORDER — SODIUM CHLORIDE 0.9 % (FLUSH) 0.9 %
3 SYRINGE (ML) INJECTION EVERY 12 HOURS SCHEDULED
Status: DISCONTINUED | OUTPATIENT
Start: 2020-12-02 | End: 2020-12-02 | Stop reason: HOSPADM

## 2020-12-02 RX ORDER — ONDANSETRON 2 MG/ML
4 INJECTION INTRAMUSCULAR; INTRAVENOUS EVERY 8 HOURS PRN
Status: DISCONTINUED | OUTPATIENT
Start: 2020-12-02 | End: 2020-12-02 | Stop reason: HOSPADM

## 2020-12-02 RX ORDER — ASPIRIN 325 MG
325 TABLET ORAL ONCE
Status: COMPLETED | OUTPATIENT
Start: 2020-12-02 | End: 2020-12-02

## 2020-12-02 RX ORDER — MIDAZOLAM HYDROCHLORIDE 1 MG/ML
INJECTION INTRAMUSCULAR; INTRAVENOUS AS NEEDED
Status: DISCONTINUED | OUTPATIENT
Start: 2020-12-02 | End: 2020-12-02 | Stop reason: HOSPADM

## 2020-12-02 RX ORDER — ACETAMINOPHEN 325 MG/1
650 TABLET ORAL EVERY 4 HOURS PRN
Status: DISCONTINUED | OUTPATIENT
Start: 2020-12-02 | End: 2020-12-02 | Stop reason: HOSPADM

## 2020-12-02 RX ORDER — SODIUM CHLORIDE 9 MG/ML
3 INJECTION, SOLUTION INTRAVENOUS CONTINUOUS
Status: ACTIVE | OUTPATIENT
Start: 2020-12-02 | End: 2020-12-02

## 2020-12-02 RX ORDER — ASPIRIN 325 MG
325 TABLET, DELAYED RELEASE (ENTERIC COATED) ORAL DAILY
Status: DISCONTINUED | OUTPATIENT
Start: 2020-12-03 | End: 2020-12-02 | Stop reason: HOSPADM

## 2020-12-02 RX ORDER — SODIUM CHLORIDE 0.9 % (FLUSH) 0.9 %
10 SYRINGE (ML) INJECTION AS NEEDED
Status: DISCONTINUED | OUTPATIENT
Start: 2020-12-02 | End: 2020-12-02 | Stop reason: HOSPADM

## 2020-12-02 RX ORDER — ATORVASTATIN CALCIUM 80 MG/1
80 TABLET, FILM COATED ORAL DAILY
Qty: 90 TABLET | Refills: 3 | Status: SHIPPED | OUTPATIENT
Start: 2020-12-02

## 2020-12-02 RX ADMIN — ASPIRIN 325 MG ORAL TABLET 325 MG: 325 PILL ORAL at 08:21

## 2020-12-02 RX ADMIN — SODIUM CHLORIDE 3 ML/KG/HR: 9 INJECTION, SOLUTION INTRAVENOUS at 08:03

## 2020-12-02 NOTE — INTERVAL H&P NOTE
H&P reviewed. The patient was examined and there are no changes to the H&P.      Pre-cardiac Catheterization Report  Cardiovascular Laboratory  [facility]    Patient:  Grant Ann  :  1955  PCP:  Kenn Crowder DO  PHONE:  [phone]    DATE: 2020      MEDICATIONS:  Prior to Admission medications    Medication Sig Start Date End Date Taking? Authorizing Provider   amLODIPine (NORVASC) 2.5 MG tablet Take 2.5 mg by mouth Daily.    Franchesca Mittal MD   ASPIRIN LOW DOSE 81 MG EC tablet TAKE 1 TABLET BY MOUTH EVERY DAY 3/5/19   Kell Mclaughlin PA-C   atorvastatin (LIPITOR) 40 MG tablet Take 40 mg by mouth Daily.    Franchesca Mittal MD   citalopram (CeleXA) 40 MG tablet Take 40 mg by mouth Daily.    Franchesca Mittal MD   clopidogrel (PLAVIX) 75 MG tablet Take 75 mg by mouth Daily.    Franchesca Mittal MD   glipizide (GLUCOTROL) 5 MG tablet Take 10 mg by mouth 2 (Two) Times a Day Before Meals.    Franchesca Mittal MD   insulin glargine (LANTUS) 100 UNIT/ML injection Inject 27 Units under the skin into the appropriate area as directed Daily.    Franchesca Mittal MD   isosorbide mononitrate (IMDUR) 30 MG 24 hr tablet Take 30 mg by mouth Daily.    Franchesca Mittal MD   levothyroxine (SYNTHROID, LEVOTHROID) 50 MCG tablet Take 50 mcg by mouth Daily. 17   Franchesca Mittal MD   nitroglycerin (NITROSTAT) 0.4 MG SL tablet Place 0.4 mg under the tongue Every 5 (Five) Minutes As Needed for Chest Pain. Take no more than 3 doses in 15 minutes.    Franchesca Mittal MD   ranolazine (RANEXA) 1000 MG 12 hr tablet Take 1 tablet by mouth Every 12 (Twelve) Hours. 20   Norberto Bender PA       Past medical & surgical history, social and family history reviewed in the electronic medical record.    Physical Exam:    Vitals: There were no vitals filed for this visit. There were no vitals filed for this visit.There is no height or weight on file to  calculate BMI.    GENERAL: No apparent distress.  No significant changes since last exam.  CHEST: Clear to auscultation bilaterally no stridor no wheeze.  CV: S1, S2, Regular without Murmurs, Rubs or Gallops  EXTREMITIES: No edema.    Barbaeu Test:  Left: Normal  (oxymetric Allens) Right: Not Assessed               No results found for: CHOL, CHLPL  No results found for: TRIG  No results found for: HDL  No results found for: LDL, LDLDIRECT      CrCl cannot be calculated (Patient's most recent lab result is older than the maximum 30 days allowed.).    POC scr 1.6      IMPRESSION:  Anginal class in last 2 weeks:  CCS class IV    PLAN:  · Procedure to perform: LHC  · Planned access:  L wrist      Electronically signed by SAMMY Kaur, 12/02/20, 7:55 AM EST.

## 2021-01-26 ENCOUNTER — OFFICE VISIT (OUTPATIENT)
Dept: CARDIOLOGY | Facility: CLINIC | Age: 66
End: 2021-01-26

## 2021-01-26 VITALS
WEIGHT: 219 LBS | BODY MASS INDEX: 30.66 KG/M2 | HEART RATE: 57 BPM | SYSTOLIC BLOOD PRESSURE: 118 MMHG | DIASTOLIC BLOOD PRESSURE: 70 MMHG | HEIGHT: 71 IN

## 2021-01-26 DIAGNOSIS — I10 ESSENTIAL HYPERTENSION: ICD-10-CM

## 2021-01-26 DIAGNOSIS — I25.118 CORONARY ARTERY DISEASE OF NATIVE ARTERY OF NATIVE HEART WITH STABLE ANGINA PECTORIS (HCC): Primary | ICD-10-CM

## 2021-01-26 DIAGNOSIS — E78.2 MIXED HYPERLIPIDEMIA: ICD-10-CM

## 2021-01-26 PROCEDURE — 99214 OFFICE O/P EST MOD 30 MIN: CPT | Performed by: INTERNAL MEDICINE

## 2021-01-26 RX ORDER — ISOSORBIDE MONONITRATE 30 MG/1
30 TABLET, EXTENDED RELEASE ORAL DAILY
Qty: 903 TABLET | Refills: 3 | Status: SHIPPED | OUTPATIENT
Start: 2021-01-26

## 2021-01-26 RX ORDER — CLOPIDOGREL BISULFATE 75 MG/1
75 TABLET ORAL DAILY
Qty: 90 TABLET | Refills: 3 | Status: SHIPPED | OUTPATIENT
Start: 2021-01-26

## 2021-01-26 RX ORDER — AMLODIPINE BESYLATE 2.5 MG/1
2.5 TABLET ORAL DAILY
Qty: 90 TABLET | Refills: 3 | Status: SHIPPED | OUTPATIENT
Start: 2021-01-26

## 2021-01-26 RX ORDER — RANOLAZINE 1000 MG/1
1000 TABLET, EXTENDED RELEASE ORAL EVERY 12 HOURS SCHEDULED
Qty: 180 TABLET | Refills: 3 | Status: SHIPPED | OUTPATIENT
Start: 2021-01-26 | End: 2022-03-25 | Stop reason: SDUPTHER

## 2021-01-26 NOTE — PROGRESS NOTES
[facility]iology    Encounter Date: 2021    Patient ID: Grant Ann is a 65 y.o. male.  : 1955     PCP: Kenn Crowder DO       Chief Complaint: Hyperlipidemia and Hypertension      PROBLEM LIST:  1. Coronary artery disease  a. 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  The LAD had a 99% stenotic lesion right after the takeoff of the first large diagonal treated with a 2.25 mm stent  b. 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 Extensive plaque involving the LAD with probable long segment high-grade stenosis although stenosis measurement limited by presence of extensive calcified plaque. LVEF: 69%.  c. [facility], 2020: EF 65%. Nonobstructive CAD involving multiple vessels with patent stent of the LAD.  2. Essential hypertension  3. Mixed hyperlipidemia  4. Stage 3 chronic kidney disease  5. Diabetes mellitus type 2 in obese  6. GERD  7. Hypothyroid  8. Anxiety and depression  9. Osteoarthritis of right hip  10. Postlaminectomy syndrome of lumbar region  11. Mild obesity  12. Lumbar herniated disc    History of Present Illness  Patient presents today for a hospital follow-up with a history of coronary artery disease and cardiac risk factors. The patient had a left heart catheterization on 2020. Since last visit, he underwent cardiac catheterization which revealed nonobstructive disease.  His Ranexa was increased from 500 to 1000 mg twice daily.  He returns today for follow-up.  States he continues to have occasional, mild chest discomfort.  This resolves rapidly and does not require the use of sublingual nitroglycerin.  He denies exertional dyspnea, denies orthopnea, PND, claudication, lower extreme edema.   "He said no awareness of tachyarrhythmias, no dizziness or syncope.  States his blood pressures at home have been running about 120 mmHg systolic.  His heart rate typically runs in the 50s.  He is tolerating high-dose statin therapy and it is due to be rechecked soon through primary care.  He request refills of his cardiac medications.    No Known Allergies      Current Outpatient Medications:   •  amLODIPine (NORVASC) 2.5 MG tablet, Take 2.5 mg by mouth Daily., Disp: , Rfl:   •  ASPIRIN LOW DOSE 81 MG EC tablet, TAKE 1 TABLET BY MOUTH EVERY DAY, Disp: 30 tablet, Rfl: 0  •  atorvastatin (LIPITOR) 80 MG tablet, Take 1 tablet by mouth Daily., Disp: 90 tablet, Rfl: 3  •  citalopram (CeleXA) 40 MG tablet, Take 40 mg by mouth Daily., Disp: , Rfl:   •  clopidogrel (PLAVIX) 75 MG tablet, Take 75 mg by mouth Daily., Disp: , Rfl:   •  glipizide (GLUCOTROL) 5 MG tablet, Take 10 mg by mouth 2 (Two) Times a Day Before Meals., Disp: , Rfl:   •  insulin glargine (LANTUS) 100 UNIT/ML injection, Inject 27 Units under the skin into the appropriate area as directed Daily., Disp: , Rfl:   •  isosorbide mononitrate (IMDUR) 30 MG 24 hr tablet, Take 30 mg by mouth Daily., Disp: , Rfl:   •  levothyroxine (SYNTHROID, LEVOTHROID) 50 MCG tablet, Take 50 mcg by mouth Daily., Disp: , Rfl:   •  nitroglycerin (NITROSTAT) 0.4 MG SL tablet, Place 0.4 mg under the tongue Every 5 (Five) Minutes As Needed for Chest Pain. Take no more than 3 doses in 15 minutes., Disp: , Rfl:   •  ranolazine (RANEXA) 1000 MG 12 hr tablet, Take 1 tablet by mouth Every 12 (Twelve) Hours., Disp: 60 tablet, Rfl: 3    The following portions of the patient's history were reviewed and updated as appropriate: allergies, current medications, past family history, past medical history, past social history, past surgical history and problem list.        Objective:     /70 (BP Location: Left arm, Patient Position: Sitting)   Pulse 57   Ht 180.3 cm (71\")   Wt 99.3 kg (219 " lb)   BMI 30.54 kg/m²      Constitutional:       Appearance: Well-developed.   Pulmonary:      Effort: Pulmonary effort is normal. No respiratory distress.      Breath sounds: Normal breath sounds. No wheezing. No rales.      Comments: Bases clear  Chest:      Chest wall: Not tender to palpatation.   Cardiovascular:      Normal rate. Regular rhythm.      No gallop.   Pulses:     Intact distal pulses.   Musculoskeletal: Normal range of motion.       Constitutional: Patient appears well-developed and well-nourished.   HENT: HEENT exam unremarkable.   Neck: Neck supple. No JVD present. No carotid bruits.   Cardiovascular: Normal rate, regular rhythm and normal heart sounds. No murmur heard.   2+ symmetric pulses.   Pulmonary/Chest: Breath sounds normal. Does not exhibit tenderness.   Abdominal: Abdomen benign.   Musculoskeletal: Does not exhibit edema.   Neurological: Neurological exam unremarkable.   Vitals reviewed.    Data Review:   Lab Results   Component Value Date    GLUCOSE 181 (H) 12/02/2020    BUN 20 12/02/2020    CREATININE 1.57 (H) 12/02/2020    EGFRIFNONA 45 (L) 12/02/2020    BCR 12.7 12/02/2020    K 4.3 12/02/2020    CO2 26.0 12/02/2020    CALCIUM 9.3 12/02/2020    ALBUMIN 4.20 12/02/2020    AST 21 12/02/2020    ALT 24 12/02/2020     Lab Results   Component Value Date    CHOL 155 12/02/2020    TRIG 89 12/02/2020    HDL 46 12/02/2020    LDL 92 12/02/2020      Lab Results   Component Value Date    WBC 6.85 12/02/2020    RBC 4.11 (L) 12/02/2020    HGB 13.5 12/02/2020    HCT 39.7 12/02/2020    MCV 96.6 12/02/2020     12/02/2020     Lab Results   Component Value Date    HGBA1C 9.10 (H) 12/02/2020        Procedures       Assessment:      Diagnosis Plan   1. Coronary artery disease involving native coronary artery of native heart with stable angina pectoris (CMS/HCC)   stable angina, continue current medical regimen   2. Essential hypertension   well managed on current medical regimen   3. Mixed  hyperlipidemia   tolerating high-dose statin and scheduled to be rechecked within the next month through primary care     Plan:   Refilled all cardiac medications at the current dose  Continue current medications.   FU in 6 MO, sooner as needed.  Thank you for allowing us to participate in the care of your patient.         Electronically signed by SAMMY Kaur, 01/26/21, 9:52 AM EST.      Please note that portions of this note may have been completed with a voice recognition program. Efforts were made to edit the dictations, but occasionally words are mistranscribed.

## 2021-02-22 DIAGNOSIS — Z23 IMMUNIZATION DUE: ICD-10-CM

## 2021-08-02 NOTE — PROGRESS NOTES
[facility] Cardiology    Encounter Date: 2021    Patient ID: Grant Ann is a 65 y.o. male.  : 1955     PCP: Kenn Crowder DO       Chief Complaint: Coronary Artery Disease      PROBLEM LIST:  1. coronary artery disease  a. 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  The LAD had a 99% stenotic lesion right after the takeoff of the first large diagonal treated with a 2.25 mm stent  b. 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 Extensive plaque involving the LAD with probable long segment high-grade stenosis although stenosis measurement limited by presence of extensive calcified plaque. LVEF: 69%.  c. [facility], 2020: EF 65%. Nonobstructive CAD involving multiple vessels with patent stent of the LAD.  2. Essential hypertension  3. Mixed hyperlipidemia  4. Stage 3 chronic kidney disease  5. Diabetes mellitus type 2 in obese  6. GERD  7. Hypothyroid  8. Anxiety and depression  9. Osteoarthritis of right hip  10. Postlaminectomy syndrome of lumbar region  11. Mild obesity  12. Lumbar herniated disc     History of Present Illness  Patient presents today for a 6 month follow-up with a history of coronary artery disease and cardiac risk factors. Since last visit, he is overall done well.  He states he has had to use a single sublingual nitroglycerin about 3 times in the past 6 months at which time he got rapid relief.  He otherwise has no consistent exertional chest discomfort.  He denies exertional dyspnea.  Further denies orthopnea, PND, claudication.  He has had some mild lower extremity edema which is self-limiting and typically resolves overnight.  Had no awareness of tachyarrhythmias, he denies dizziness or syncope.   "States his blood pressure at home typically runs about 120 mmHg systolic.  His lipids were checked about a month ago by primary care and reported to be favorable.  He is compliant with current medical regimen reports no significant adverse side effects.    No Known Allergies      Current Outpatient Medications:   •  amLODIPine (NORVASC) 2.5 MG tablet, Take 1 tablet by mouth Daily., Disp: 90 tablet, Rfl: 3  •  ASPIRIN LOW DOSE 81 MG EC tablet, TAKE 1 TABLET BY MOUTH EVERY DAY, Disp: 30 tablet, Rfl: 0  •  atorvastatin (LIPITOR) 80 MG tablet, Take 1 tablet by mouth Daily., Disp: 90 tablet, Rfl: 3  •  citalopram (CeleXA) 40 MG tablet, Take 40 mg by mouth Daily., Disp: , Rfl:   •  clopidogrel (PLAVIX) 75 MG tablet, Take 1 tablet by mouth Daily., Disp: 90 tablet, Rfl: 3  •  glipizide (GLUCOTROL) 5 MG tablet, Take 5 mg by mouth 2 (Two) Times a Day Before Meals. 2 tablets twice daily, Disp: , Rfl:   •  insulin glargine (LANTUS) 100 UNIT/ML injection, Inject 27 Units under the skin into the appropriate area as directed Daily., Disp: , Rfl:   •  isosorbide mononitrate (IMDUR) 30 MG 24 hr tablet, Take 1 tablet by mouth Daily., Disp: 903 tablet, Rfl: 3  •  levothyroxine (SYNTHROID, LEVOTHROID) 50 MCG tablet, Take 50 mcg by mouth Daily., Disp: , Rfl:   •  nitroglycerin (NITROSTAT) 0.4 MG SL tablet, Place 0.4 mg under the tongue Every 5 (Five) Minutes As Needed for Chest Pain. Take no more than 3 doses in 15 minutes., Disp: , Rfl:   •  ranolazine (RANEXA) 1000 MG 12 hr tablet, Take 1 tablet by mouth Every 12 (Twelve) Hours., Disp: 180 tablet, Rfl: 3      Objective:     /62 (BP Location: Left arm, Patient Position: Sitting)   Pulse 69   Ht 180.3 cm (71\")   Wt 94.8 kg (209 lb)   SpO2 96%   BMI 29.15 kg/m²      Constitutional:       Appearance: Well-developed.   Pulmonary:      Effort: Pulmonary effort is normal. No respiratory distress.      Breath sounds: Normal breath sounds. No wheezing. No rales.      Comments: Bases " clear  Chest:      Chest wall: Not tender to palpatation.   Cardiovascular:      Normal rate. Regular rhythm.      Murmurs: There is no murmur.      No gallop. No click. No rub.   Pulses:     Intact distal pulses.   Edema:     Peripheral edema absent.   Musculoskeletal: Normal range of motion.         Data Review:   Lab Results   Component Value Date    GLUCOSE 181 (H) 12/02/2020    BUN 20 12/02/2020    CREATININE 1.57 (H) 12/02/2020    EGFRIFNONA 45 (L) 12/02/2020    BCR 12.7 12/02/2020    K 4.3 12/02/2020    CO2 26.0 12/02/2020    CALCIUM 9.3 12/02/2020    ALBUMIN 4.20 12/02/2020    AST 21 12/02/2020    ALT 24 12/02/2020     Lab Results   Component Value Date    CHOL 155 12/02/2020    TRIG 89 12/02/2020    HDL 46 12/02/2020    LDL 92 12/02/2020      Lab Results   Component Value Date    WBC 6.85 12/02/2020    RBC 4.11 (L) 12/02/2020    HGB 13.5 12/02/2020    HCT 39.7 12/02/2020    MCV 96.6 12/02/2020     12/02/2020     Lab Results   Component Value Date    HGBA1C 9.10 (H) 12/02/2020        Procedures       Assessment:      Diagnosis Plan   1. Coronary artery disease of native artery of native heart with stable angina pectoris (CMS/LTAC, located within [facility])   stable angina, continue current medical regimen   2. Essential hypertension   well managed, continue current medical regimen   3. Mixed hyperlipidemia   tolerating high-dose statin and monitored by primary care.  Reported to be favorable on current medical regimen     Plan:     Continue current medications.   FU in 6 MO, sooner as needed.  Thank you for allowing us to participate in the care of your patient.         Electronically signed by SAMMY Kaur, 08/03/21, 9:31 AM EDT.      Please note that portions of this note may have been completed with a voice recognition program. Efforts were made to edit the dictations, but occasionally words are mistranscribed.

## 2021-08-03 ENCOUNTER — OFFICE VISIT (OUTPATIENT)
Dept: CARDIOLOGY | Facility: CLINIC | Age: 66
End: 2021-08-03

## 2021-08-03 VITALS
BODY MASS INDEX: 29.26 KG/M2 | WEIGHT: 209 LBS | HEIGHT: 71 IN | SYSTOLIC BLOOD PRESSURE: 125 MMHG | DIASTOLIC BLOOD PRESSURE: 62 MMHG | HEART RATE: 69 BPM | OXYGEN SATURATION: 96 %

## 2021-08-03 DIAGNOSIS — I10 ESSENTIAL HYPERTENSION: ICD-10-CM

## 2021-08-03 DIAGNOSIS — E78.2 MIXED HYPERLIPIDEMIA: ICD-10-CM

## 2021-08-03 DIAGNOSIS — I25.118 CORONARY ARTERY DISEASE OF NATIVE ARTERY OF NATIVE HEART WITH STABLE ANGINA PECTORIS (HCC): Primary | ICD-10-CM

## 2021-08-03 PROCEDURE — 99214 OFFICE O/P EST MOD 30 MIN: CPT | Performed by: INTERNAL MEDICINE

## 2022-03-11 NOTE — PROGRESS NOTES
[facility] Cardiology    Encounter Date: 03/15/2022    Patient ID: Grant Ann is a 66 y.o. male.  : 1955     PCP: Kenn Crowder DO       Chief Complaint: Coronary artery disease of native artery of native heart wi      PROBLEM LIST:  1. Coronary artery disease  a. 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  The LAD had a 99% stenotic lesion right after the takeoff of the first large diagonal treated with a 2.25 mm stent  b. 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 Extensive plaque involving the LAD with probable long segment high-grade stenosis although stenosis measurement limited by presence of extensive calcified plaque. LVEF: 69%.  c. [facility], 2020: EF 65%. Nonobstructive CAD involving multiple vessels with patent stent of the LAD.  2. Essential hypertension  3. Mixed hyperlipidemia  4. Stage 3 chronic kidney disease  5. Diabetes mellitus type 2 in obese  6. GERD  7. Hypothyroid  8. Anxiety and depression  9. Osteoarthritis of right hip  10. Postlaminectomy syndrome of lumbar region  11. Mild obesity  12. COVID-- 2022  13. Lumbar herniated disc     History of Present Illness  Patient presents today for a 6 month follow-up with a history of coronary artery disease and cardiac risk factors. Since last visit, he has done fairly well from a cardiac standpoint.  His blood pressure has been well -controlled at home.  He had COVID in February.  He states he notes chest pain every 3-4 days that he describes as a pressure with radiation down his left arm.   He happens most often when he is sitting down.  It is usually relieved with 1-2 SL NTG tablets.   He had his lipid panel performed 4 months ago with his PCP and  was told that it was OK.    No Known Allergies      Current Outpatient Medications:   •  amLODIPine (NORVASC) 2.5 MG tablet, Take 1 tablet by mouth Daily., Disp: 90 tablet, Rfl: 3  •  ASPIRIN LOW DOSE 81 MG EC tablet, TAKE 1 TABLET BY MOUTH EVERY DAY, Disp: 30 tablet, Rfl: 0  •  atorvastatin (LIPITOR) 80 MG tablet, Take 1 tablet by mouth Daily., Disp: 90 tablet, Rfl: 3  •  citalopram (CeleXA) 40 MG tablet, Take 40 mg by mouth Daily., Disp: , Rfl:   •  clopidogrel (PLAVIX) 75 MG tablet, Take 1 tablet by mouth Daily., Disp: 90 tablet, Rfl: 3  •  glipizide (GLUCOTROL) 5 MG tablet, Take 10 mg by mouth 2 (Two) Times a Day Before Meals., Disp: , Rfl:   •  insulin glargine (LANTUS) 100 UNIT/ML injection, Inject 34 Units under the skin into the appropriate area as directed Daily., Disp: , Rfl:   •  isosorbide mononitrate (IMDUR) 30 MG 24 hr tablet, Take 1 tablet by mouth Daily. (Patient taking differently: Take 60 mg by mouth Daily.), Disp: 903 tablet, Rfl: 3  •  levothyroxine (SYNTHROID, LEVOTHROID) 50 MCG tablet, Take 50 mcg by mouth Daily., Disp: , Rfl:   •  nitroglycerin (NITROSTAT) 0.4 MG SL tablet, Place 0.4 mg under the tongue Every 5 (Five) Minutes As Needed for Chest Pain. Take no more than 3 doses in 15 minutes., Disp: , Rfl:   •  ranolazine (RANEXA) 1000 MG 12 hr tablet, Take 1 tablet by mouth Every 12 (Twelve) Hours., Disp: 180 tablet, Rfl: 3  •  omeprazole (priLOSEC) 40 MG capsule, Take 40 mg by mouth Daily As Needed., Disp: , Rfl:     The following portions of the patient's history were reviewed and updated as appropriate: allergies, current medications, past family history, past medical history, past social history, past surgical history and problem list.    ROS  Review of Systems   Constitution: Negative for chills, fever, fatigue, generalized weakness.   Cardiovascular: positive for chest pain,denies dyspnea on exertion, leg swelling, palpitations, orthopnea, and syncope.   Respiratory: Negative for cough,  "shortness of breath, and wheezing.  HENT: Negative for ear pain, nosebleeds, and tinnitus.  Gastrointestinal: Negative for abdominal pain, constipation, diarrhea, nausea and vomiting.   Genitourinary: No urinary symptoms.  Musculoskeletal: Negative for muscle cramps.  Neurological: Negative for dizziness, headaches, loss of balance, numbness, and symptoms of stroke.  Psychiatric: Normal mental status.     All other systems reviewed and are negative.        Objective:     /66 (BP Location: Left arm, Patient Position: Sitting, Cuff Size: Adult)   Pulse 67   Ht 180.3 cm (71\")   Wt 98 kg (216 lb)   SpO2 97%   BMI 30.13 kg/m²      Physical Exam  Constitutional: Patient appears well-developed and well-nourished.   HENT: HEENT exam unremarkable.   Neck: Neck supple. No JVD present. No carotid bruits.   Cardiovascular: Normal rate, regular rhythm and normal heart sounds. No murmur heard.   2+ symmetric pulses.   Pulmonary/Chest: Breath sounds normal. Does not exhibit tenderness.   Abdominal: Abdomen benign.   Musculoskeletal: Does not exhibit edema.   Neurological: Neurological exam unremarkable.   Vitals reviewed.    Data Review:     Lab Results   Component Value Date    CHOL 155 12/02/2020    TRIG 89 12/02/2020    HDL 46 12/02/2020    LDL 92 12/02/2020           ECG 12 Lead    Date/Time: 3/15/2022 9:30 AM  Performed by: Casper Stoll MD  Authorized by: Casper Stoll MD   Comparison: compared with previous ECG from 11/20/2020  Similar to previous ECG  Rhythm: sinus rhythm  Rate: bradycardic  BPM: 54  Conduction: non-specific intraventricular conduction delay  Other findings: left ventricular hypertrophy    Clinical impression: abnormal EKG               Assessment:      Diagnosis Plan   1. Angina at rest (HCC)  Stress Test With Myocardial Perfusion (1 Day)   2. Coronary artery disease of native artery of native heart with stable angina pectoris (HCC)  Continue Ranexa, Imdur, Plavix and ASA   3. Essential " hypertension  Controlled- continue Amlodipine   4. Mixed hyperlipidemia  Continue Atorvastatin   5. Diabetes mellitus type 2 in obese (HCC)       Plan:   Intermittent episodes of chest pain at rest- will evaluate with Lexiscan Stress in [place]- Dr. Romero to read.  Continue current medications.   FU in 6 MO, sooner as needed.  Thank you for allowing us to participate in the care of your patient.     Scribed for Casper Stoll MD by Sania Heredia RN. 3/15/2022  09:29 EDT      I, Casper Stoll MD, personally performed the services described in this documentation as scribed by the above named individual in my presence, and it is both accurate and complete.  3/15/2022  09:54 EDT        Part of this note may be an electronic transcription/translation of spoken language to printed text using the Dragon Dictation System.

## 2022-03-15 ENCOUNTER — OFFICE VISIT (OUTPATIENT)
Dept: CARDIOLOGY | Facility: CLINIC | Age: 67
End: 2022-03-15

## 2022-03-15 VITALS
HEART RATE: 67 BPM | BODY MASS INDEX: 30.24 KG/M2 | SYSTOLIC BLOOD PRESSURE: 129 MMHG | HEIGHT: 71 IN | WEIGHT: 216 LBS | OXYGEN SATURATION: 97 % | DIASTOLIC BLOOD PRESSURE: 66 MMHG

## 2022-03-15 DIAGNOSIS — E11.69 DIABETES MELLITUS TYPE 2 IN OBESE: ICD-10-CM

## 2022-03-15 DIAGNOSIS — I20.8 ANGINA AT REST: Primary | ICD-10-CM

## 2022-03-15 DIAGNOSIS — I25.118 CORONARY ARTERY DISEASE OF NATIVE ARTERY OF NATIVE HEART WITH STABLE ANGINA PECTORIS: ICD-10-CM

## 2022-03-15 DIAGNOSIS — I10 ESSENTIAL HYPERTENSION: ICD-10-CM

## 2022-03-15 DIAGNOSIS — E78.2 MIXED HYPERLIPIDEMIA: ICD-10-CM

## 2022-03-15 DIAGNOSIS — E66.9 DIABETES MELLITUS TYPE 2 IN OBESE: ICD-10-CM

## 2022-03-15 PROCEDURE — 93000 ELECTROCARDIOGRAM COMPLETE: CPT | Performed by: INTERNAL MEDICINE

## 2022-03-15 PROCEDURE — 99214 OFFICE O/P EST MOD 30 MIN: CPT | Performed by: INTERNAL MEDICINE

## 2022-03-15 RX ORDER — OMEPRAZOLE 40 MG/1
40 CAPSULE, DELAYED RELEASE ORAL DAILY PRN
COMMUNITY
Start: 2022-02-11

## 2022-03-25 RX ORDER — RANOLAZINE 1000 MG/1
1000 TABLET, EXTENDED RELEASE ORAL EVERY 12 HOURS SCHEDULED
Qty: 180 TABLET | Refills: 3 | Status: SHIPPED | OUTPATIENT
Start: 2022-03-25 | End: 2022-11-23 | Stop reason: SDUPTHER

## 2022-04-15 ENCOUNTER — HOSPITAL ENCOUNTER (OUTPATIENT)
Dept: NUCLEAR MEDICINE | Facility: HOSPITAL | Age: 67
Discharge: HOME | End: 2022-04-15

## 2022-04-15 ENCOUNTER — HOSPITAL ENCOUNTER (OUTPATIENT)
Dept: CARDIOLOGY | Facility: HOSPITAL | Age: 67
Discharge: HOME | End: 2022-04-15

## 2022-04-15 DIAGNOSIS — I20.8 ANGINA AT REST: ICD-10-CM

## 2022-04-15 LAB
BH CV NUCLEAR PRIOR STUDY: 3
BH CV REST NUCLEAR ISOTOPE DOSE: 10.1 MCI
BH CV STRESS BP STAGE 1: NORMAL
BH CV STRESS BP STAGE 2: NORMAL
BH CV STRESS COMMENTS STAGE 1: NORMAL
BH CV STRESS COMMENTS STAGE 2: NORMAL
BH CV STRESS DOSE REGADENOSON STAGE 1: 0.4
BH CV STRESS DURATION MIN STAGE 1: 0
BH CV STRESS DURATION MIN STAGE 2: 4
BH CV STRESS DURATION SEC STAGE 1: 10
BH CV STRESS DURATION SEC STAGE 2: 0
BH CV STRESS HR STAGE 1: 66
BH CV STRESS HR STAGE 2: 78
BH CV STRESS NUCLEAR ISOTOPE DOSE: 29.7 MCI
BH CV STRESS PROTOCOL 1: NORMAL
BH CV STRESS RECOVERY BP: NORMAL MMHG
BH CV STRESS RECOVERY HR: 67 BPM
BH CV STRESS STAGE 1: 1
BH CV STRESS STAGE 2: 2
LV EF NUC BP: 64 %
MAXIMAL PREDICTED HEART RATE: 156 BPM
PERCENT MAX PREDICTED HR: 50 %
STRESS BASELINE BP: NORMAL MMHG
STRESS BASELINE HR: 55 BPM
STRESS PERCENT HR: 59 %
STRESS POST PEAK BP: NORMAL MMHG
STRESS POST PEAK HR: 78 BPM
STRESS TARGET HR: 133 BPM

## 2022-04-15 PROCEDURE — 78452 HT MUSCLE IMAGE SPECT MULT: CPT | Performed by: INTERNAL MEDICINE

## 2022-04-15 PROCEDURE — 0 TECHNETIUM SESTAMIBI: Performed by: INTERNAL MEDICINE

## 2022-04-15 PROCEDURE — 93018 CV STRESS TEST I&R ONLY: CPT | Performed by: INTERNAL MEDICINE

## 2022-04-15 PROCEDURE — A9500 TC99M SESTAMIBI: HCPCS | Performed by: INTERNAL MEDICINE

## 2022-04-15 PROCEDURE — 25010000002 REGADENOSON 0.4 MG/5ML SOLUTION: Performed by: INTERNAL MEDICINE

## 2022-04-15 PROCEDURE — 78452 HT MUSCLE IMAGE SPECT MULT: CPT

## 2022-04-15 PROCEDURE — 93017 CV STRESS TEST TRACING ONLY: CPT

## 2022-04-15 RX ADMIN — TECHNETIUM TC 99M SESTAMIBI 1 DOSE: 1 INJECTION INTRAVENOUS at 11:12

## 2022-04-15 RX ADMIN — TECHNETIUM TC 99M SESTAMIBI 1 DOSE: 1 INJECTION INTRAVENOUS at 09:35

## 2022-04-15 RX ADMIN — REGADENOSON 0.4 MG: 0.08 INJECTION, SOLUTION INTRAVENOUS at 11:12

## 2022-05-10 ENCOUNTER — TELEPHONE (OUTPATIENT)
Dept: CARDIOLOGY | Facility: CLINIC | Age: 67
End: 2022-05-10

## 2022-07-10 ENCOUNTER — HOSPITAL ENCOUNTER (EMERGENCY)
Dept: HOSPITAL 79 - ER1 | Age: 67
Discharge: HOME | End: 2022-07-10
Payer: MEDICARE

## 2022-07-10 DIAGNOSIS — T18.128A: Primary | ICD-10-CM

## 2022-07-10 DIAGNOSIS — I25.10: ICD-10-CM
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
## 2022-07-10 DIAGNOSIS — Z95.5: ICD-10-CM

## 2022-07-10 DIAGNOSIS — K22.2: ICD-10-CM

## 2022-07-10 DIAGNOSIS — I25.2: ICD-10-CM

## 2022-07-27 ENCOUNTER — TRANSCRIBE ORDERS (OUTPATIENT)
Dept: LAB | Facility: HOSPITAL | Age: 67
End: 2022-07-27

## 2022-07-27 DIAGNOSIS — Z01.818 OTHER SPECIFIED PRE-OPERATIVE EXAMINATION: Primary | ICD-10-CM

## 2022-07-27 PROCEDURE — U0004 COV-19 TEST NON-CDC HGH THRU: HCPCS | Performed by: INTERNAL MEDICINE

## 2022-07-28 LAB — SARS-COV-2 RNA PNL SPEC NAA+PROBE: NOT DETECTED

## 2022-09-19 ENCOUNTER — HOSPITAL ENCOUNTER (OUTPATIENT)
Dept: HOSPITAL 79 - RAD | Age: 67
End: 2022-09-19
Attending: INTERNAL MEDICINE
Payer: MEDICARE

## 2022-09-19 DIAGNOSIS — K22.2: ICD-10-CM

## 2022-09-19 DIAGNOSIS — K44.9: ICD-10-CM

## 2022-09-19 DIAGNOSIS — R13.19: Primary | ICD-10-CM

## 2022-09-20 ENCOUNTER — OFFICE VISIT (OUTPATIENT)
Dept: CARDIOLOGY | Facility: CLINIC | Age: 67
End: 2022-09-20

## 2022-09-20 VITALS
HEART RATE: 61 BPM | BODY MASS INDEX: 28.7 KG/M2 | WEIGHT: 205 LBS | SYSTOLIC BLOOD PRESSURE: 107 MMHG | OXYGEN SATURATION: 97 % | DIASTOLIC BLOOD PRESSURE: 60 MMHG | HEIGHT: 71 IN

## 2022-09-20 DIAGNOSIS — I10 ESSENTIAL HYPERTENSION: ICD-10-CM

## 2022-09-20 DIAGNOSIS — I25.10 CORONARY ARTERY DISEASE INVOLVING NATIVE CORONARY ARTERY OF NATIVE HEART WITHOUT ANGINA PECTORIS: Primary | ICD-10-CM

## 2022-09-20 DIAGNOSIS — E78.2 MIXED HYPERLIPIDEMIA: ICD-10-CM

## 2022-09-20 DIAGNOSIS — E11.69 DIABETES MELLITUS TYPE 2 IN OBESE: ICD-10-CM

## 2022-09-20 DIAGNOSIS — E66.9 DIABETES MELLITUS TYPE 2 IN OBESE: ICD-10-CM

## 2022-09-20 PROCEDURE — 99214 OFFICE O/P EST MOD 30 MIN: CPT

## 2022-09-20 RX ORDER — AMLODIPINE BESYLATE 5 MG/1
5 TABLET ORAL DAILY
COMMUNITY
Start: 2022-08-02

## 2022-09-20 RX ORDER — SEMAGLUTIDE 1.34 MG/ML
INJECTION, SOLUTION SUBCUTANEOUS WEEKLY
COMMUNITY
Start: 2022-06-16

## 2022-09-20 NOTE — PROGRESS NOTES
[facility] Cardiology    Encounter Date: 2022    Patient ID: Grnat Ann is a 66 y.o. male.  : 1955     PCP: Kenn Crowder DO       Chief Complaint: angina at rest       PROBLEM LIST:  1. Coronary artery disease  a. 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  The LAD had a 99% stenotic lesion right after the takeoff of the first large diagonal treated with a 2.25 mm stent  b. 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 Extensive plaque involving the LAD with probable long segment high-grade stenosis although stenosis measurement limited by presence of extensive calcified plaque. LVEF: 69%.  c. [facility], 2020: EF 65%. Nonobstructive CAD involving multiple vessels with patent stent of the LAD.  d. Stress test, 04/15/2022; EF 64%. GI artifact is present. No evidence of ischemia. Low risk study.    2. Essential hypertension  3. Mixed hyperlipidemia  4. Stage 3 chronic kidney disease  5. Diabetes mellitus type 2 in obese  6. GERD  7. Hypothyroid  8. Anxiety and depression  9. Osteoarthritis of right hip  10. Postlaminectomy syndrome of lumbar region  11. Mild obesity  12. COVID-- 2022  13. Lumbar herniated disc        History of Present Illness  Patient presents today for a 6 month follow-up with a history of coronary artery disease and cardiac risk factors. Since last visit, patient notes that he has continued to have some chest pain which he describes as squeezing. This pain has not been exacerbated by activity and mostly occurs at rest after eating. He notes he had difficulty swallowing. He denies chest pain with exertion, shortness of breath, palpitations, orthopnea, and edema.     No Known  Allergies      Current Outpatient Medications:   •  amLODIPine (NORVASC) 2.5 MG tablet, Take 1 tablet by mouth Daily., Disp: 90 tablet, Rfl: 3  •  amLODIPine (NORVASC) 5 MG tablet, Take 5 mg by mouth Daily., Disp: , Rfl:   •  ASPIRIN LOW DOSE 81 MG EC tablet, TAKE 1 TABLET BY MOUTH EVERY DAY, Disp: 30 tablet, Rfl: 0  •  atorvastatin (LIPITOR) 80 MG tablet, Take 1 tablet by mouth Daily., Disp: 90 tablet, Rfl: 3  •  citalopram (CeleXA) 40 MG tablet, Take 40 mg by mouth Daily., Disp: , Rfl:   •  clopidogrel (PLAVIX) 75 MG tablet, Take 1 tablet by mouth Daily., Disp: 90 tablet, Rfl: 3  •  glipizide (GLUCOTROL) 5 MG tablet, Take 10 mg by mouth 2 (Two) Times a Day Before Meals., Disp: , Rfl:   •  insulin glargine (LANTUS) 100 UNIT/ML injection, Inject 34 Units under the skin into the appropriate area as directed Daily., Disp: , Rfl:   •  isosorbide mononitrate (IMDUR) 30 MG 24 hr tablet, Take 1 tablet by mouth Daily. (Patient taking differently: Take 60 mg by mouth Daily.), Disp: 903 tablet, Rfl: 3  •  levothyroxine (SYNTHROID, LEVOTHROID) 50 MCG tablet, Take 50 mcg by mouth Daily., Disp: , Rfl:   •  nitroglycerin (NITROSTAT) 0.4 MG SL tablet, Place 0.4 mg under the tongue Every 5 (Five) Minutes As Needed for Chest Pain. Take no more than 3 doses in 15 minutes., Disp: , Rfl:   •  omeprazole (priLOSEC) 40 MG capsule, Take 40 mg by mouth Daily As Needed., Disp: , Rfl:   •  Ozempic, 0.25 or 0.5 MG/DOSE, 2 MG/1.5ML solution pen-injector, 1 (One) Time Per Week., Disp: , Rfl:   •  ranolazine (RANEXA) 1000 MG 12 hr tablet, Take 1 tablet by mouth Every 12 (Twelve) Hours., Disp: 180 tablet, Rfl: 3    The following portions of the patient's history were reviewed and updated as appropriate: allergies, current medications, past family history, past medical history, past social history, past surgical history and problem list.    ROS  systems reviewed and are negative except for that listed in the HPI.         Objective:     /60  "(BP Location: Left arm, Patient Position: Sitting)   Pulse 61   Ht 180.3 cm (71\")   Wt 93 kg (205 lb)   SpO2 97%   BMI 28.59 kg/m²      Physical Exam  Constitutional: Patient appears well-developed and well-nourished.   HENT: HEENT exam unremarkable.   Neck: Neck supple. No JVD present. No carotid bruits.   Cardiovascular: Normal rate, regular rhythm and normal heart sounds. No murmur heard.   2+ symmetric pulses.   Pulmonary/Chest: Breath sounds normal. Does not exhibit tenderness.   Abdominal: Abdomen benign.   Musculoskeletal: Does not exhibit edema.   Neurological: Neurological exam unremarkable.   Vitals reviewed.    Data Review:     Lab date: 06/13/2022  • FLP: , , HDL 43, LDL 88  • CMP: Glu 68, BUN 19, Creat 1.64, eGFR 46, Na 142, K 4.3, Cl 103, CO2 23, Ca 9.5, Alk Phos 84, AST 22, ALT 25  • HbA1c: 9.2           Procedures             Assessment:      Diagnosis Plan   1. Coronary artery disease involving native coronary artery of native heart without angina pectoris  Patient with atypical chest pain that is not worse with exertion. Seems to be correlated with meals and notes that he does have difficulty swallowing. Stress test from 4-2022 with no evidence of ischemia.    2. Essential hypertension  Low normal. Continue current medications   3. Mixed hyperlipidemia  Acceptable. LDL above target.    4. Diabetes mellitus type 2 in obese (HCC)  Managed per PCP     Plan:   Patient with atypical chest pain related to meals with associated dysphagia and recent normal stress test. We recommend follow up with GI for further evaluation. If there is no cleat etiology and chest pain persists, we will consider evaluation with left heart catheterization.  Continue current medications.   FU in 6 MO, sooner as needed.  Thank you for allowing us to participate in the care of your patient.         Charmaine Ramos PA-C      Please note that portions of this note may have been completed with a voice " recognition program. Efforts were made to edit the dictations, but occasionally words are mistranscribed.

## 2022-11-18 ENCOUNTER — TRANSCRIBE ORDERS (OUTPATIENT)
Dept: ADMINISTRATIVE | Facility: HOSPITAL | Age: 67
End: 2022-11-18

## 2022-11-18 DIAGNOSIS — N18.30 STAGE 3 CHRONIC KIDNEY DISEASE, UNSPECIFIED WHETHER STAGE 3A OR 3B CKD: Primary | ICD-10-CM

## 2022-11-23 ENCOUNTER — TELEPHONE (OUTPATIENT)
Dept: CARDIOLOGY | Facility: CLINIC | Age: 67
End: 2022-11-23

## 2022-11-23 RX ORDER — RANOLAZINE 1000 MG/1
1000 TABLET, EXTENDED RELEASE ORAL EVERY 12 HOURS SCHEDULED
Qty: 180 TABLET | Refills: 1 | Status: SHIPPED | OUTPATIENT
Start: 2022-11-23

## 2022-11-28 ENCOUNTER — HOSPITAL ENCOUNTER (OUTPATIENT)
Dept: ULTRASOUND IMAGING | Facility: HOSPITAL | Age: 67
Discharge: HOME | End: 2022-11-28
Admitting: INTERNAL MEDICINE

## 2022-11-28 DIAGNOSIS — N18.30 STAGE 3 CHRONIC KIDNEY DISEASE, UNSPECIFIED WHETHER STAGE 3A OR 3B CKD: ICD-10-CM

## 2022-11-28 PROCEDURE — 76775 US EXAM ABDO BACK WALL LIM: CPT | Performed by: RADIOLOGY

## 2022-11-28 PROCEDURE — 76775 US EXAM ABDO BACK WALL LIM: CPT

## 2023-04-11 ENCOUNTER — OFFICE VISIT (OUTPATIENT)
Dept: CARDIOLOGY | Facility: CLINIC | Age: 68
End: 2023-04-11
Payer: MEDICARE

## 2023-04-11 VITALS
OXYGEN SATURATION: 98 % | BODY MASS INDEX: 29.01 KG/M2 | HEIGHT: 71 IN | DIASTOLIC BLOOD PRESSURE: 63 MMHG | HEART RATE: 53 BPM | SYSTOLIC BLOOD PRESSURE: 106 MMHG | WEIGHT: 207.2 LBS

## 2023-04-11 DIAGNOSIS — E78.2 MIXED HYPERLIPIDEMIA: ICD-10-CM

## 2023-04-11 DIAGNOSIS — I25.118 CORONARY ARTERY DISEASE OF NATIVE ARTERY OF NATIVE HEART WITH STABLE ANGINA PECTORIS: Primary | ICD-10-CM

## 2023-04-11 DIAGNOSIS — R06.02 SHORTNESS OF BREATH: ICD-10-CM

## 2023-04-11 DIAGNOSIS — I10 ESSENTIAL HYPERTENSION: ICD-10-CM

## 2023-04-11 DIAGNOSIS — I25.709 CORONARY ARTERY DISEASE INVOLVING CORONARY BYPASS GRAFT OF NATIVE HEART WITH ANGINA PECTORIS: ICD-10-CM

## 2023-04-11 RX ORDER — LANCETS
EACH MISCELLANEOUS
COMMUNITY
Start: 2023-03-27

## 2023-04-11 RX ORDER — PEN NEEDLE, DIABETIC 32GX 5/32"
NEEDLE, DISPOSABLE MISCELLANEOUS
COMMUNITY
Start: 2023-03-27

## 2023-04-11 RX ORDER — DULAGLUTIDE 0.75 MG/.5ML
INJECTION, SOLUTION SUBCUTANEOUS
COMMUNITY
Start: 2023-03-14

## 2023-04-11 RX ORDER — LORATADINE 10 MG/1
10 CAPSULE, LIQUID FILLED ORAL DAILY
COMMUNITY

## 2023-04-11 NOTE — H&P (VIEW-ONLY)
[facility] Cardiology    Encounter Date: 2023    Patient ID: Grant Ann is a 67 y.o. male.  : 1955     PCP: Kenn Crowder DO       Chief Complaint: Coronary artery disease involving native coronary artery of      PROBLEM LIST:  1. Coronary artery disease  a. 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  The LAD had a 99% stenotic lesion right after the takeoff of the first large diagonal treated with a 2.25 mm stent  b. 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 Extensive plaque involving the LAD with probable long segment high-grade stenosis although stenosis measurement limited by presence of extensive calcified plaque. LVEF: 69%.  c. [facility], 2020: EF 65%. Nonobstructive CAD involving multiple vessels with patent stent of the LAD.  d. Stress test, 04/15/2022; EF 64%. GI artifact is present. No evidence of ischemia. Low risk study.    2. Essential hypertension  3. Mixed hyperlipidemia  4. Stage 3 chronic kidney disease  5. Diabetes mellitus type 2 in obese  6. GERD  7. Hypothyroid  8. Anxiety and depression  9. Osteoarthritis of right hip  10. Postlaminectomy syndrome of lumbar region  11. Mild obesity  12. COVID-- 2022  13. Lumbar herniated disc     History of Present Illness  Patient presents today for a 6 month follow-up with a history of coronary artery disease and cardiac risk factors. Since last visit, patient complains of a decline in his overall exercise capacity with shortness of breath upon on exertion. He has also been experiencing frequent chest pressure and associated feeling of fatigue and sometimes nausea.Patient denies palpitations, edema, dizziness, and syncope.      No Known  Allergies      Current Outpatient Medications:   •  Accu-Chek Softclix Lancets lancets, USE AS DIRECTED TWICE DAILY AND AS NEEDED, Disp: , Rfl:   •  amLODIPine (NORVASC) 2.5 MG tablet, Take 1 tablet by mouth Daily., Disp: 90 tablet, Rfl: 3  •  ASPIRIN LOW DOSE 81 MG EC tablet, TAKE 1 TABLET BY MOUTH EVERY DAY, Disp: 30 tablet, Rfl: 0  •  atorvastatin (LIPITOR) 80 MG tablet, Take 1 tablet by mouth Daily., Disp: 90 tablet, Rfl: 3  •  BD Pen Needle Joyce 2nd Gen 32G X 4 MM misc, USE AS DIRECTED EVERY DAY, Disp: , Rfl:   •  citalopram (CeleXA) 40 MG tablet, Take 1 tablet by mouth Daily., Disp: , Rfl:   •  clopidogrel (PLAVIX) 75 MG tablet, Take 1 tablet by mouth Daily., Disp: 90 tablet, Rfl: 3  •  glipizide (GLUCOTROL) 5 MG tablet, Take 2 tablets by mouth 2 (Two) Times a Day Before Meals., Disp: , Rfl:   •  insulin glargine (LANTUS) 100 UNIT/ML injection, Inject 34 Units under the skin into the appropriate area as directed Daily., Disp: , Rfl:   •  isosorbide mononitrate (IMDUR) 30 MG 24 hr tablet, Take 1 tablet by mouth Daily. (Patient taking differently: Take 2 tablets by mouth Daily.), Disp: 903 tablet, Rfl: 3  •  levothyroxine (SYNTHROID, LEVOTHROID) 50 MCG tablet, Take 1 tablet by mouth Daily., Disp: , Rfl:   •  Loratadine 10 MG capsule, Take  by mouth Daily., Disp: , Rfl:   •  nitroglycerin (NITROSTAT) 0.4 MG SL tablet, Place 1 tablet under the tongue Every 5 (Five) Minutes As Needed for Chest Pain. Take no more than 3 doses in 15 minutes., Disp: , Rfl:   •  omeprazole (priLOSEC) 40 MG capsule, Take 1 capsule by mouth Daily As Needed., Disp: , Rfl:   •  ranolazine (RANEXA) 1000 MG 12 hr tablet, Take 1 tablet by mouth Every 12 (Twelve) Hours., Disp: 180 tablet, Rfl: 1  •  Trulicity 0.75 MG/0.5ML solution pen-injector, ADMINISTER 0.75 MG UNDER THE SKIN 1 TIME WEEKLY, Disp: , Rfl:     The following portions of the patient's history were reviewed and updated as appropriate: allergies, current medications, past family  "history, past medical history, past social history, past surgical history and problem list.    ROS  Review of Systems   14 point ROS negative except for that listed in the HPI.          Objective:     /63 (BP Location: Left arm, Patient Position: Sitting)   Pulse 53   Ht 180.3 cm (71\")   Wt 94 kg (207 lb 3.2 oz)   SpO2 98%   BMI 28.90 kg/m²      Physical Exam  Constitutional: Patient appears well-developed and well-nourished.   HENT: HEENT exam unremarkable.   Neck: Neck supple. No JVD present. No carotid bruits.   Cardiovascular: Normal rate, regular rhythm and normal heart sounds. No murmur heard.   2+ symmetric pulses.   Pulmonary/Chest: Breath sounds normal. Does not exhibit tenderness.   Abdominal: Abdomen benign.   Musculoskeletal: Does not exhibit edema.   Neurological: Neurological exam unremarkable.   Vitals reviewed.    Data Review:     Lab date: 06/13/2022  • FLP: , , HDL 43, LDL 88  • CMP: Glu 68, BUN 19, Creat 1.64, eGFR 46, Na 142, K 4.3, Cl 103, CO2 23, Ca 9.5, Alk Phos 84, AST 22, ALT 25  • HbA1c: 9.2     Procedures     Assessment:      Diagnosis Plan   1. Coronary artery disease of native artery of native heart with progressive angina pectoris  [facility] to be scheduled to further evaluate progressive angina despite optimal medical therapy and triple antianginals.  His previous stress test had shown an artifact and repeat noninvasive testing would be of low yield.  For now continue aspirin 81 mg and Plavix 75 mg daily for DAPT, Imdur 60 mg daily and ranexa 1000 mg BID as well as nitroglycerin 0.4 mg as needed for angina.     2. Essential hypertension  Well controlled. Continue on amlodipine and isosorbide   3. Mixed hyperlipidemia  Continue on atorvastatin 80 mg daily for hyperlipidemia.       Plan:   [facility] to be scheduled to further evaluate progressive/unstable angina type symptoms despite multiple antianginals.  Previous stress test was equivocal in short artifact and repeat " noninvasive testing would be of low yield.    The procedure was explained to the patient/family extensively. Indications, benefits, risks and alternatives were discussed. The patient understands well, and wishes to proceed.   Continue current medications.   FU after procedure and further recommendation will be made accordingly.  Thank you for allowing us to participate in the care of your patient.       Scribed for Casper Stoll MD by Thalia Red. 4/11/2023 10:21 EDT         I, Casper Stoll MD, personally performed the services described in this documentation as scribed by the above named individual in my presence, and it is both accurate and complete.  4/13/2023  06:50 EDT      Please note that portions of this note may have been completed with a voice recognition program. Efforts were made to edit the dictations, but occasionally words are mistranscribed.

## 2023-04-11 NOTE — PROGRESS NOTES
[facility] Cardiology    Encounter Date: 2023    Patient ID: Grant Ann is a 67 y.o. male.  : 1955     PCP: Kenn Crowder DO       Chief Complaint: Coronary artery disease involving native coronary artery of      PROBLEM LIST:  1. Coronary artery disease  a. 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  The LAD had a 99% stenotic lesion right after the takeoff of the first large diagonal treated with a 2.25 mm stent  b. 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 Extensive plaque involving the LAD with probable long segment high-grade stenosis although stenosis measurement limited by presence of extensive calcified plaque. LVEF: 69%.  c. [facility], 2020: EF 65%. Nonobstructive CAD involving multiple vessels with patent stent of the LAD.  d. Stress test, 04/15/2022; EF 64%. GI artifact is present. No evidence of ischemia. Low risk study.    2. Essential hypertension  3. Mixed hyperlipidemia  4. Stage 3 chronic kidney disease  5. Diabetes mellitus type 2 in obese  6. GERD  7. Hypothyroid  8. Anxiety and depression  9. Osteoarthritis of right hip  10. Postlaminectomy syndrome of lumbar region  11. Mild obesity  12. COVID-- 2022  13. Lumbar herniated disc     History of Present Illness  Patient presents today for a 6 month follow-up with a history of coronary artery disease and cardiac risk factors. Since last visit, patient complains of a decline in his overall exercise capacity with shortness of breath upon on exertion. He has also been experiencing frequent chest pressure and associated feeling of fatigue and sometimes nausea.Patient denies palpitations, edema, dizziness, and syncope.      No Known  Allergies      Current Outpatient Medications:   •  Accu-Chek Softclix Lancets lancets, USE AS DIRECTED TWICE DAILY AND AS NEEDED, Disp: , Rfl:   •  amLODIPine (NORVASC) 2.5 MG tablet, Take 1 tablet by mouth Daily., Disp: 90 tablet, Rfl: 3  •  ASPIRIN LOW DOSE 81 MG EC tablet, TAKE 1 TABLET BY MOUTH EVERY DAY, Disp: 30 tablet, Rfl: 0  •  atorvastatin (LIPITOR) 80 MG tablet, Take 1 tablet by mouth Daily., Disp: 90 tablet, Rfl: 3  •  BD Pen Needle Joyce 2nd Gen 32G X 4 MM misc, USE AS DIRECTED EVERY DAY, Disp: , Rfl:   •  citalopram (CeleXA) 40 MG tablet, Take 1 tablet by mouth Daily., Disp: , Rfl:   •  clopidogrel (PLAVIX) 75 MG tablet, Take 1 tablet by mouth Daily., Disp: 90 tablet, Rfl: 3  •  glipizide (GLUCOTROL) 5 MG tablet, Take 2 tablets by mouth 2 (Two) Times a Day Before Meals., Disp: , Rfl:   •  insulin glargine (LANTUS) 100 UNIT/ML injection, Inject 34 Units under the skin into the appropriate area as directed Daily., Disp: , Rfl:   •  isosorbide mononitrate (IMDUR) 30 MG 24 hr tablet, Take 1 tablet by mouth Daily. (Patient taking differently: Take 2 tablets by mouth Daily.), Disp: 903 tablet, Rfl: 3  •  levothyroxine (SYNTHROID, LEVOTHROID) 50 MCG tablet, Take 1 tablet by mouth Daily., Disp: , Rfl:   •  Loratadine 10 MG capsule, Take  by mouth Daily., Disp: , Rfl:   •  nitroglycerin (NITROSTAT) 0.4 MG SL tablet, Place 1 tablet under the tongue Every 5 (Five) Minutes As Needed for Chest Pain. Take no more than 3 doses in 15 minutes., Disp: , Rfl:   •  omeprazole (priLOSEC) 40 MG capsule, Take 1 capsule by mouth Daily As Needed., Disp: , Rfl:   •  ranolazine (RANEXA) 1000 MG 12 hr tablet, Take 1 tablet by mouth Every 12 (Twelve) Hours., Disp: 180 tablet, Rfl: 1  •  Trulicity 0.75 MG/0.5ML solution pen-injector, ADMINISTER 0.75 MG UNDER THE SKIN 1 TIME WEEKLY, Disp: , Rfl:     The following portions of the patient's history were reviewed and updated as appropriate: allergies, current medications, past family  "history, past medical history, past social history, past surgical history and problem list.    ROS  Review of Systems   14 point ROS negative except for that listed in the HPI.          Objective:     /63 (BP Location: Left arm, Patient Position: Sitting)   Pulse 53   Ht 180.3 cm (71\")   Wt 94 kg (207 lb 3.2 oz)   SpO2 98%   BMI 28.90 kg/m²      Physical Exam  Constitutional: Patient appears well-developed and well-nourished.   HENT: HEENT exam unremarkable.   Neck: Neck supple. No JVD present. No carotid bruits.   Cardiovascular: Normal rate, regular rhythm and normal heart sounds. No murmur heard.   2+ symmetric pulses.   Pulmonary/Chest: Breath sounds normal. Does not exhibit tenderness.   Abdominal: Abdomen benign.   Musculoskeletal: Does not exhibit edema.   Neurological: Neurological exam unremarkable.   Vitals reviewed.    Data Review:     Lab date: 06/13/2022  • FLP: , , HDL 43, LDL 88  • CMP: Glu 68, BUN 19, Creat 1.64, eGFR 46, Na 142, K 4.3, Cl 103, CO2 23, Ca 9.5, Alk Phos 84, AST 22, ALT 25  • HbA1c: 9.2     Procedures     Assessment:      Diagnosis Plan   1. Coronary artery disease of native artery of native heart with progressive angina pectoris  [facility] to be scheduled to further evaluate progressive angina despite optimal medical therapy and triple antianginals.  His previous stress test had shown an artifact and repeat noninvasive testing would be of low yield.  For now continue aspirin 81 mg and Plavix 75 mg daily for DAPT, Imdur 60 mg daily and ranexa 1000 mg BID as well as nitroglycerin 0.4 mg as needed for angina.     2. Essential hypertension  Well controlled. Continue on amlodipine and isosorbide   3. Mixed hyperlipidemia  Continue on atorvastatin 80 mg daily for hyperlipidemia.       Plan:   [facility] to be scheduled to further evaluate progressive/unstable angina type symptoms despite multiple antianginals.  Previous stress test was equivocal in short artifact and repeat " noninvasive testing would be of low yield.    The procedure was explained to the patient/family extensively. Indications, benefits, risks and alternatives were discussed. The patient understands well, and wishes to proceed.   Continue current medications.   FU after procedure and further recommendation will be made accordingly.  Thank you for allowing us to participate in the care of your patient.       Scribed for Casper Stoll MD by Thalia Red. 4/11/2023 10:21 EDT         I, Casper Stoll MD, personally performed the services described in this documentation as scribed by the above named individual in my presence, and it is both accurate and complete.  4/13/2023  06:50 EDT      Please note that portions of this note may have been completed with a voice recognition program. Efforts were made to edit the dictations, but occasionally words are mistranscribed.

## 2023-04-12 ENCOUNTER — PREP FOR SURGERY (OUTPATIENT)
Dept: OTHER | Facility: HOSPITAL | Age: 68
End: 2023-04-12
Payer: MEDICARE

## 2023-04-12 RX ORDER — ASPIRIN 81 MG/1
81 TABLET ORAL DAILY
Status: CANCELLED | OUTPATIENT
Start: 2023-04-13

## 2023-04-12 RX ORDER — SODIUM CHLORIDE 0.9 % (FLUSH) 0.9 %
10 SYRINGE (ML) INJECTION AS NEEDED
Status: CANCELLED | OUTPATIENT
Start: 2023-04-12

## 2023-04-12 RX ORDER — SODIUM CHLORIDE 0.9 % (FLUSH) 0.9 %
10 SYRINGE (ML) INJECTION EVERY 12 HOURS SCHEDULED
Status: CANCELLED | OUTPATIENT
Start: 2023-04-12

## 2023-04-12 RX ORDER — ASPIRIN 81 MG/1
324 TABLET, CHEWABLE ORAL ONCE
Status: CANCELLED | OUTPATIENT
Start: 2023-04-12 | End: 2023-04-12

## 2023-04-12 RX ORDER — SODIUM CHLORIDE 9 MG/ML
40 INJECTION, SOLUTION INTRAVENOUS AS NEEDED
Status: CANCELLED | OUTPATIENT
Start: 2023-04-12

## 2023-04-13 ENCOUNTER — HOSPITAL ENCOUNTER (OUTPATIENT)
Facility: HOSPITAL | Age: 68
Setting detail: HOSPITAL OUTPATIENT SURGERY
Discharge: HOME | End: 2023-04-13
Attending: INTERNAL MEDICINE | Admitting: INTERNAL MEDICINE
Payer: MEDICARE

## 2023-04-13 VITALS
HEART RATE: 56 BPM | WEIGHT: 206.2 LBS | SYSTOLIC BLOOD PRESSURE: 137 MMHG | RESPIRATION RATE: 16 BRPM | TEMPERATURE: 97.5 F | DIASTOLIC BLOOD PRESSURE: 73 MMHG | OXYGEN SATURATION: 97 % | BODY MASS INDEX: 28.87 KG/M2 | HEIGHT: 71 IN

## 2023-04-13 DIAGNOSIS — I25.10 CORONARY ARTERY DISEASE INVOLVING NATIVE CORONARY ARTERY OF NATIVE HEART WITHOUT ANGINA PECTORIS: ICD-10-CM

## 2023-04-13 DIAGNOSIS — I10 ESSENTIAL HYPERTENSION: ICD-10-CM

## 2023-04-13 DIAGNOSIS — I25.118 CORONARY ARTERY DISEASE OF NATIVE ARTERY OF NATIVE HEART WITH STABLE ANGINA PECTORIS: Primary | ICD-10-CM

## 2023-04-13 DIAGNOSIS — N18.31 STAGE 3A CHRONIC KIDNEY DISEASE: ICD-10-CM

## 2023-04-13 DIAGNOSIS — E78.2 MIXED HYPERLIPIDEMIA: ICD-10-CM

## 2023-04-13 LAB
ANION GAP SERPL CALCULATED.3IONS-SCNC: 11 MMOL/L (ref 5–15)
BUN BLDA-MCNC: 28 MG/DL (ref 8–26)
BUN SERPL-MCNC: 27 MG/DL (ref 8–23)
BUN/CREAT SERPL: 17.1 (ref 7–25)
CA-I BLDA-SCNC: 1.29 MMOL/L (ref 1.2–1.32)
CALCIUM SPEC-SCNC: 9.3 MG/DL (ref 8.6–10.5)
CHLORIDE BLDA-SCNC: 106 MMOL/L (ref 98–109)
CHLORIDE SERPL-SCNC: 110 MMOL/L (ref 98–107)
CHOLEST SERPL-MCNC: 161 MG/DL (ref 0–200)
CO2 BLDA-SCNC: 28 MMOL/L (ref 24–29)
CO2 SERPL-SCNC: 25 MMOL/L (ref 22–29)
CREAT BLDA-MCNC: 1.8 MG/DL (ref 0.6–1.3)
CREAT SERPL-MCNC: 1.58 MG/DL (ref 0.76–1.27)
DEPRECATED RDW RBC AUTO: 46.7 FL (ref 37–54)
EGFRCR SERPLBLD CKD-EPI 2021: 41.3 ML/MIN/1.73
EGFRCR SERPLBLD CKD-EPI 2021: 48.2 ML/MIN/1.73
ERYTHROCYTE [DISTWIDTH] IN BLOOD BY AUTOMATED COUNT: 12.6 % (ref 12.3–15.4)
GLUCOSE BLDC GLUCOMTR-MCNC: 85 MG/DL (ref 70–130)
GLUCOSE SERPL-MCNC: 90 MG/DL (ref 65–99)
HBA1C MFR BLD: 7.2 % (ref 4.8–5.6)
HCT VFR BLD AUTO: 35.8 % (ref 37.5–51)
HCT VFR BLDA CALC: 34 % (ref 38–51)
HDLC SERPL-MCNC: 43 MG/DL (ref 40–60)
HGB BLD-MCNC: 11.8 G/DL (ref 13–17.7)
HGB BLDA-MCNC: 11.6 G/DL (ref 12–17)
LDLC SERPL CALC-MCNC: 95 MG/DL (ref 0–100)
LDLC/HDLC SERPL: 2.13 {RATIO}
MCH RBC QN AUTO: 33 PG (ref 26.6–33)
MCHC RBC AUTO-ENTMCNC: 33 G/DL (ref 31.5–35.7)
MCV RBC AUTO: 100 FL (ref 79–97)
PLATELET # BLD AUTO: 200 10*3/MM3 (ref 140–450)
PMV BLD AUTO: 10.3 FL (ref 6–12)
POTASSIUM BLDA-SCNC: 4.3 MMOL/L (ref 3.5–4.9)
POTASSIUM SERPL-SCNC: 4.6 MMOL/L (ref 3.5–5.2)
RBC # BLD AUTO: 3.58 10*6/MM3 (ref 4.14–5.8)
SODIUM BLD-SCNC: 141 MMOL/L (ref 138–146)
SODIUM SERPL-SCNC: 146 MMOL/L (ref 136–145)
TRIGL SERPL-MCNC: 131 MG/DL (ref 0–150)
VLDLC SERPL-MCNC: 23 MG/DL (ref 5–40)
WBC NRBC COR # BLD: 7.82 10*3/MM3 (ref 3.4–10.8)

## 2023-04-13 PROCEDURE — 25010000002 MIDAZOLAM PER 1 MG: Performed by: INTERNAL MEDICINE

## 2023-04-13 PROCEDURE — 80061 LIPID PANEL: CPT

## 2023-04-13 PROCEDURE — 83036 HEMOGLOBIN GLYCOSYLATED A1C: CPT

## 2023-04-13 PROCEDURE — 85014 HEMATOCRIT: CPT

## 2023-04-13 PROCEDURE — 93458 L HRT ARTERY/VENTRICLE ANGIO: CPT | Performed by: INTERNAL MEDICINE

## 2023-04-13 PROCEDURE — C1894 INTRO/SHEATH, NON-LASER: HCPCS | Performed by: INTERNAL MEDICINE

## 2023-04-13 PROCEDURE — 80047 BASIC METABLC PNL IONIZED CA: CPT

## 2023-04-13 PROCEDURE — C1769 GUIDE WIRE: HCPCS | Performed by: INTERNAL MEDICINE

## 2023-04-13 PROCEDURE — 85027 COMPLETE CBC AUTOMATED: CPT | Performed by: INTERNAL MEDICINE

## 2023-04-13 PROCEDURE — 25010000002 HEPARIN (PORCINE) PER 1000 UNITS: Performed by: INTERNAL MEDICINE

## 2023-04-13 PROCEDURE — 25510000001 IOPAMIDOL PER 1 ML: Performed by: INTERNAL MEDICINE

## 2023-04-13 PROCEDURE — 80048 BASIC METABOLIC PNL TOTAL CA: CPT | Performed by: INTERNAL MEDICINE

## 2023-04-13 RX ORDER — SODIUM CHLORIDE 0.9 % (FLUSH) 0.9 %
10 SYRINGE (ML) INJECTION AS NEEDED
Status: DISCONTINUED | OUTPATIENT
Start: 2023-04-13 | End: 2023-04-13 | Stop reason: HOSPADM

## 2023-04-13 RX ORDER — NICARDIPINE HCL-0.9% SOD CHLOR 1 MG/10 ML
SYRINGE (ML) INTRAVENOUS
Status: DISCONTINUED | OUTPATIENT
Start: 2023-04-13 | End: 2023-04-13 | Stop reason: HOSPADM

## 2023-04-13 RX ORDER — SODIUM CHLORIDE 9 MG/ML
150 INJECTION, SOLUTION INTRAVENOUS CONTINUOUS
Status: ACTIVE | OUTPATIENT
Start: 2023-04-13 | End: 2023-04-13

## 2023-04-13 RX ORDER — ACETAMINOPHEN 325 MG/1
650 TABLET ORAL EVERY 4 HOURS PRN
Status: DISCONTINUED | OUTPATIENT
Start: 2023-04-13 | End: 2023-04-13 | Stop reason: HOSPADM

## 2023-04-13 RX ORDER — SODIUM CHLORIDE 0.9 % (FLUSH) 0.9 %
10 SYRINGE (ML) INJECTION EVERY 12 HOURS SCHEDULED
Status: DISCONTINUED | OUTPATIENT
Start: 2023-04-13 | End: 2023-04-13 | Stop reason: HOSPADM

## 2023-04-13 RX ORDER — SODIUM CHLORIDE 9 MG/ML
3 INJECTION, SOLUTION INTRAVENOUS CONTINUOUS
Status: ACTIVE | OUTPATIENT
Start: 2023-04-13 | End: 2023-04-13

## 2023-04-13 RX ORDER — HEPARIN SODIUM 1000 [USP'U]/ML
INJECTION, SOLUTION INTRAVENOUS; SUBCUTANEOUS
Status: DISCONTINUED | OUTPATIENT
Start: 2023-04-13 | End: 2023-04-13 | Stop reason: HOSPADM

## 2023-04-13 RX ORDER — MIDAZOLAM HYDROCHLORIDE 1 MG/ML
INJECTION INTRAMUSCULAR; INTRAVENOUS
Status: DISCONTINUED | OUTPATIENT
Start: 2023-04-13 | End: 2023-04-13 | Stop reason: HOSPADM

## 2023-04-13 RX ORDER — ASPIRIN 81 MG/1
81 TABLET ORAL DAILY
Status: DISCONTINUED | OUTPATIENT
Start: 2023-04-14 | End: 2023-04-13 | Stop reason: HOSPADM

## 2023-04-13 RX ORDER — ASPIRIN 81 MG/1
324 TABLET, CHEWABLE ORAL ONCE
Status: DISCONTINUED | OUTPATIENT
Start: 2023-04-13 | End: 2023-04-13 | Stop reason: HOSPADM

## 2023-04-13 RX ORDER — SODIUM CHLORIDE 9 MG/ML
40 INJECTION, SOLUTION INTRAVENOUS AS NEEDED
Status: DISCONTINUED | OUTPATIENT
Start: 2023-04-13 | End: 2023-04-13 | Stop reason: HOSPADM

## 2023-04-13 RX ORDER — LIDOCAINE HYDROCHLORIDE 10 MG/ML
INJECTION, SOLUTION EPIDURAL; INFILTRATION; INTRACAUDAL; PERINEURAL
Status: DISCONTINUED | OUTPATIENT
Start: 2023-04-13 | End: 2023-04-13 | Stop reason: HOSPADM

## 2023-04-13 RX ADMIN — SODIUM CHLORIDE 3 ML/KG/HR: 9 INJECTION, SOLUTION INTRAVENOUS at 06:45

## 2023-04-13 NOTE — PROGRESS NOTES
Referral received for Phase II Cardiac Rehab.  Staff has reviewed chart and patient does not have a qualifying diagnosis for Phase II Cardiac Rehab at this time.  Staff available if further consultation is needed.  
Stable

## 2023-05-18 RX ORDER — RANOLAZINE 1000 MG/1
1000 TABLET, EXTENDED RELEASE ORAL EVERY 12 HOURS SCHEDULED
Qty: 180 TABLET | Refills: 1 | Status: SHIPPED | OUTPATIENT
Start: 2023-05-18

## 2023-09-25 ENCOUNTER — TELEPHONE (OUTPATIENT)
Dept: CARDIOLOGY | Facility: CLINIC | Age: 68
End: 2023-09-25

## 2023-09-25 NOTE — TELEPHONE ENCOUNTER
Patient is having a skin cancer removal.    Called patient to let him know we usually hold Plavix longer than than but if the Doctor is okay with out holding it or only holding it that long then he just needs to continue his Aspirin.     Patient verbalized understanding and will reach out to his Doctor.

## 2023-09-25 NOTE — TELEPHONE ENCOUNTER
Caller: Grant Ann    Relationship: Self    Best call back number: [phone]    What is the best time to reach you: ANY    Who are you requesting to speak with (clinical staff, provider,  specific staff member): CLINICAL    What was the call regarding: PATIENT IS HAVING A PROCEDURE ON WEDNESDAY 9-27-23 AND WOULD LIKE APPROVAL TO DISCONTINUE HIS PLAVIX UNTIL AFTER THE PROCEDURE. PLEASE ADVISE PATIENT ON THIS ISSUE.    Is it okay if the provider responds through [employer] [facility] [employer]: PHONE CALL PLEASE.

## 2024-05-12 ENCOUNTER — APPOINTMENT (OUTPATIENT)
Dept: GENERAL RADIOLOGY | Facility: HOSPITAL | Age: 69
End: 2024-05-12
Payer: MEDICARE

## 2024-05-12 LAB
ALBUMIN SERPL-MCNC: 4.5 G/DL (ref 3.5–5.2)
ALBUMIN/GLOB SERPL: 1.9 G/DL
ALP SERPL-CCNC: 101 U/L (ref 39–117)
ALT SERPL W P-5'-P-CCNC: 42 U/L (ref 1–41)
ANION GAP SERPL CALCULATED.3IONS-SCNC: 12.3 MMOL/L (ref 5–15)
AST SERPL-CCNC: 35 U/L (ref 1–40)
BASOPHILS # BLD AUTO: 0.02 10*3/MM3 (ref 0–0.2)
BASOPHILS NFR BLD AUTO: 0.2 % (ref 0–1.5)
BILIRUB SERPL-MCNC: 0.4 MG/DL (ref 0–1.2)
BUN SERPL-MCNC: 20 MG/DL (ref 8–23)
BUN/CREAT SERPL: 10.6 (ref 7–25)
CALCIUM SPEC-SCNC: 9.4 MG/DL (ref 8.6–10.5)
CHLORIDE SERPL-SCNC: 105 MMOL/L (ref 98–107)
CO2 SERPL-SCNC: 22.7 MMOL/L (ref 22–29)
CREAT SERPL-MCNC: 1.88 MG/DL (ref 0.76–1.27)
DEPRECATED RDW RBC AUTO: 45.5 FL (ref 37–54)
EGFRCR SERPLBLD CKD-EPI 2021: 38.9 ML/MIN/1.73
EOSINOPHIL # BLD AUTO: 0.15 10*3/MM3 (ref 0–0.4)
EOSINOPHIL NFR BLD AUTO: 1.8 % (ref 0.3–6.2)
ERYTHROCYTE [DISTWIDTH] IN BLOOD BY AUTOMATED COUNT: 12.7 % (ref 12.3–15.4)
GLOBULIN UR ELPH-MCNC: 2.4 GM/DL
GLUCOSE SERPL-MCNC: 73 MG/DL (ref 65–99)
HCT VFR BLD AUTO: 40.5 % (ref 37.5–51)
HGB BLD-MCNC: 13.8 G/DL (ref 13–17.7)
HOLD SPECIMEN: NORMAL
HOLD SPECIMEN: NORMAL
IMM GRANULOCYTES # BLD AUTO: 0.03 10*3/MM3 (ref 0–0.05)
IMM GRANULOCYTES NFR BLD AUTO: 0.4 % (ref 0–0.5)
LYMPHOCYTES # BLD AUTO: 2.36 10*3/MM3 (ref 0.7–3.1)
LYMPHOCYTES NFR BLD AUTO: 28.5 % (ref 19.6–45.3)
MCH RBC QN AUTO: 33.2 PG (ref 26.6–33)
MCHC RBC AUTO-ENTMCNC: 34.1 G/DL (ref 31.5–35.7)
MCV RBC AUTO: 97.4 FL (ref 79–97)
MONOCYTES # BLD AUTO: 0.8 10*3/MM3 (ref 0.1–0.9)
MONOCYTES NFR BLD AUTO: 9.7 % (ref 5–12)
NEUTROPHILS NFR BLD AUTO: 4.93 10*3/MM3 (ref 1.7–7)
NEUTROPHILS NFR BLD AUTO: 59.4 % (ref 42.7–76)
NRBC BLD AUTO-RTO: 0 /100 WBC (ref 0–0.2)
PLATELET # BLD AUTO: 224 10*3/MM3 (ref 140–450)
PMV BLD AUTO: 10.4 FL (ref 6–12)
POTASSIUM SERPL-SCNC: 4.8 MMOL/L (ref 3.5–5.2)
PROT SERPL-MCNC: 6.9 G/DL (ref 6–8.5)
RBC # BLD AUTO: 4.16 10*6/MM3 (ref 4.14–5.8)
SODIUM SERPL-SCNC: 140 MMOL/L (ref 136–145)
TROPONIN T SERPL HS-MCNC: 16 NG/L
WBC NRBC COR # BLD AUTO: 8.29 10*3/MM3 (ref 3.4–10.8)
WHOLE BLOOD HOLD COAG: NORMAL
WHOLE BLOOD HOLD SPECIMEN: NORMAL

## 2024-05-12 PROCEDURE — 71046 X-RAY EXAM CHEST 2 VIEWS: CPT

## 2024-05-12 PROCEDURE — 85025 COMPLETE CBC W/AUTO DIFF WBC: CPT | Performed by: STUDENT IN AN ORGANIZED HEALTH CARE EDUCATION/TRAINING PROGRAM

## 2024-05-12 PROCEDURE — 36415 COLL VENOUS BLD VENIPUNCTURE: CPT

## 2024-05-12 PROCEDURE — 80053 COMPREHEN METABOLIC PANEL: CPT | Performed by: STUDENT IN AN ORGANIZED HEALTH CARE EDUCATION/TRAINING PROGRAM

## 2024-05-12 PROCEDURE — 71046 X-RAY EXAM CHEST 2 VIEWS: CPT | Performed by: RADIOLOGY

## 2024-05-12 PROCEDURE — 99284 EMERGENCY DEPT VISIT MOD MDM: CPT

## 2024-05-12 PROCEDURE — 93005 ELECTROCARDIOGRAM TRACING: CPT | Performed by: STUDENT IN AN ORGANIZED HEALTH CARE EDUCATION/TRAINING PROGRAM

## 2024-05-12 PROCEDURE — 84484 ASSAY OF TROPONIN QUANT: CPT | Performed by: STUDENT IN AN ORGANIZED HEALTH CARE EDUCATION/TRAINING PROGRAM

## 2024-05-12 RX ORDER — SODIUM CHLORIDE 0.9 % (FLUSH) 0.9 %
10 SYRINGE (ML) INJECTION AS NEEDED
Status: DISCONTINUED | OUTPATIENT
Start: 2024-05-12 | End: 2024-05-13 | Stop reason: HOSPADM

## 2024-05-12 RX ORDER — ASPIRIN 81 MG/1
324 TABLET, CHEWABLE ORAL ONCE
Status: DISCONTINUED | OUTPATIENT
Start: 2024-05-12 | End: 2024-05-13 | Stop reason: HOSPADM

## 2024-05-13 ENCOUNTER — APPOINTMENT (OUTPATIENT)
Dept: CT IMAGING | Facility: HOSPITAL | Age: 69
End: 2024-05-13
Payer: MEDICARE

## 2024-05-13 ENCOUNTER — HOSPITAL ENCOUNTER (EMERGENCY)
Facility: HOSPITAL | Age: 69
Discharge: HOME | End: 2024-05-13
Attending: STUDENT IN AN ORGANIZED HEALTH CARE EDUCATION/TRAINING PROGRAM | Admitting: STUDENT IN AN ORGANIZED HEALTH CARE EDUCATION/TRAINING PROGRAM
Payer: MEDICARE

## 2024-05-13 VITALS
BODY MASS INDEX: 29.4 KG/M2 | WEIGHT: 210 LBS | HEART RATE: 57 BPM | DIASTOLIC BLOOD PRESSURE: 73 MMHG | RESPIRATION RATE: 18 BRPM | TEMPERATURE: 97.8 F | SYSTOLIC BLOOD PRESSURE: 125 MMHG | OXYGEN SATURATION: 98 % | HEIGHT: 71 IN

## 2024-05-13 DIAGNOSIS — R13.19 ESOPHAGEAL DYSPHAGIA: Primary | ICD-10-CM

## 2024-05-13 DIAGNOSIS — R07.9 CHEST PAIN, UNSPECIFIED TYPE: ICD-10-CM

## 2024-05-13 LAB
GEN 5 2HR TROPONIN T REFLEX: 15 NG/L
GLUCOSE BLDC GLUCOMTR-MCNC: 71 MG/DL (ref 70–130)
TROPONIN T DELTA: -1 NG/L

## 2024-05-13 PROCEDURE — 84484 ASSAY OF TROPONIN QUANT: CPT | Performed by: STUDENT IN AN ORGANIZED HEALTH CARE EDUCATION/TRAINING PROGRAM

## 2024-05-13 PROCEDURE — 82948 REAGENT STRIP/BLOOD GLUCOSE: CPT

## 2024-05-13 PROCEDURE — 70450 CT HEAD/BRAIN W/O DYE: CPT | Performed by: RADIOLOGY

## 2024-05-13 PROCEDURE — 70450 CT HEAD/BRAIN W/O DYE: CPT

## 2024-05-13 PROCEDURE — 93005 ELECTROCARDIOGRAM TRACING: CPT | Performed by: STUDENT IN AN ORGANIZED HEALTH CARE EDUCATION/TRAINING PROGRAM

## 2024-05-13 NOTE — ED PROVIDER NOTES
Subjective   History of Present Illness  68-year-old male presents to the ER with chief complaint of chest pain.  Patient did experience a choking episode at home while eating steak.  Patient was able to get the steak back up.  Patient does have dysphagia secondary to narrowing of the esophagus.  Last scope with stretching of the esophagus was little over a year ago this was done in [place] by Dr. barnett.  Patient verbalized it was very violent choking episode.  Had numbness in bilateral upper extremities and a headache.  Patient verbalized also numbness of the lips and mouth.        Review of Systems   Constitutional: Negative.  Negative for fever.   HENT:  Positive for trouble swallowing.    Respiratory:  Positive for cough.    Cardiovascular:  Positive for chest pain.   Gastrointestinal: Negative.  Negative for abdominal pain.   Endocrine: Negative.    Genitourinary: Negative.  Negative for dysuria.   Skin: Negative.    Neurological:  Positive for numbness and headaches.   Psychiatric/Behavioral: Negative.     All other systems reviewed and are negative.      Past Medical History:   Diagnosis Date    Arthritis     Depression     Disease of thyroid gland     Essential hypertension 11/19/2020    GERD (gastroesophageal reflux disease)     Recurrent boils     Wears glasses     Wears partial dentures        No Known Allergies    Past Surgical History:   Procedure Laterality Date    CARDIAC CATHETERIZATION      CARDIAC CATHETERIZATION Left 12/02/2020    Procedure: Left Heart Cath;  Surgeon: Casper Stoll MD;  Location:  [facility];  Service: Cardiology;  Laterality: Left;    CARDIAC CATHETERIZATION N/A 4/13/2023    Procedure: Left Heart Cath;  Surgeon: Casper Stoll MD;  Location:  [facility];  Service: Cardiology;  Laterality: N/A;    COLONOSCOPY  08/2022    FL UPPER GI ESOPHAGRAM  08/2022    KNEE ARTHROSCOPY      KNEE ARTHROSCOPY W/ MENISCAL REPAIR Right 11/18/2016    LUMBAR  DISCECTOMY Left 04/21/2017    Procedure: LEFT LUMBAR FORAMINOTOMY DISCECTOMY FAR LATERAL L3-4;  Surgeon: Norberto Puente MD;  Location: [facility];  Service:        Family History   Problem Relation Age of Onset    No Known Problems Mother     Heart disease Father     Heart failure Father        Social History     Socioeconomic History    Marital status:    Tobacco Use    Smoking status: Never     Passive exposure: Past    Smokeless tobacco: Never   Vaping Use    Vaping status: Never Used   Substance and Sexual Activity    Alcohol use: No    Drug use: No    Sexual activity: Defer           Objective   Physical Exam  Vitals and nursing note reviewed.   Constitutional:       General: He is not in acute distress.     Appearance: He is well-developed. He is not diaphoretic.   HENT:      Head: Normocephalic and atraumatic.      Right Ear: External ear normal.      Left Ear: External ear normal.      Nose: Nose normal.   Eyes:      Conjunctiva/sclera: Conjunctivae normal.   Neck:      Vascular: No JVD.      Trachea: No tracheal deviation.   Cardiovascular:      Rate and Rhythm: Normal rate and regular rhythm.      Heart sounds: Normal heart sounds. No murmur heard.  Pulmonary:      Effort: Pulmonary effort is normal. No respiratory distress.      Breath sounds: Normal breath sounds. No wheezing.   Abdominal:      Palpations: Abdomen is soft.      Tenderness: There is no abdominal tenderness.   Musculoskeletal:         General: No deformity. Normal range of motion.      Cervical back: Normal range of motion and neck supple.   Skin:     General: Skin is warm and dry.      Coloration: Skin is not pale.      Findings: No erythema or rash.   Neurological:      Mental Status: He is alert and oriented to person, place, and time.      Cranial Nerves: No cranial nerve deficit.   Psychiatric:         Behavior: Behavior normal.         Thought Content: Thought content normal.         Procedures           ED Course  ED Course  as of 05/14/24 0536   Sun May 12, 2024   2222 ECG 22:21 NSR, rate 70. Incomplete RBBB. Septal infarct, age undetermined. qT/qTc 398/429 [SELVIN]   Mon May 13, 2024   0105 CXR rad interpreted:  No acute cardiopulmonary process.      [RB]   0125 Patient had a normal heart cath in April 2023.  Patient is followed by cardiologist Dr. Stoll. [RB]   0238 EKG obtained and independently interpreted as sinus bradycardia without acute ischemic findings  Electronically signed by Miah Ames MD, 05/13/24, 2:39 AM EDT.   [AS]   0429 CT head rad interpreted:  NO ACUTE INTRACRANIAL ABNORMALITY. [RB]      ED Course User Index  [AS] Miah Ames MD  [SELVIN] Olaf Ochoa MD  [RB] Landen Cid II, PA                                             Medical Decision Making  Problems Addressed:  Chest pain, unspecified type: complicated acute illness or injury  Esophageal dysphagia: complicated acute illness or injury    Amount and/or Complexity of Data Reviewed  Labs: ordered.  Radiology: ordered.  ECG/medicine tests: ordered.    Risk  OTC drugs.  Prescription drug management.        Final diagnoses:   Esophageal dysphagia   Chest pain, unspecified type       ED Disposition  ED Disposition       ED Disposition   Discharge    Condition   Stable    Comment   --               Kenn Crowder, [address]  [address]  [place] [address]  [phone]    Schedule an appointment as soon as possible for a visit            Medication List      No changes were made to your prescriptions during this visit.            Landen Cid II, PA  05/14/24 0536

## 2024-05-15 LAB
QT INTERVAL: 398 MS
QT INTERVAL: 452 MS
QTC INTERVAL: 429 MS
QTC INTERVAL: 443 MS

## 2024-07-15 NOTE — PROGRESS NOTES
"[facility] Cardiology    Encounter Date: 2024    Patient ID: Grant Ann is a 68 y.o. male.  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 Extensive plaque involving the LAD with probable long segment high-grade stenosis although stenosis measurement limited by presence of extensive calcified plaque. LVEF: 69%.  [facility], 2020: EF 65%. Nonobstructive CAD involving multiple vessels with patent stent of the LAD.  Stress test, 04/15/2022; EF 64%. GI artifact is present. No evidence of ischemia. Low risk study.    [facility], 2023: EF 65%. Nonobstructive CAD. Patent stent of the LAD.  Essential hypertension  Mixed hyperlipidemia  Stage 3 chronic kidney disease  Diabetes mellitus type 2 in obese  GERD  Hypothyroid  Anxiety and depression  Osteoarthritis of right hip  Postlaminectomy syndrome of lumbar region  Mild obesity  COVID-19- 2022  Lumbar herniated disc     History of Present Illness  Patient presents today for 1 year follow-up with a history of coronary artery disease and cardiac risk factors. Since last visit, patient did have an ED visit in May with complaints of chest pain.  This occurred after a pretty significant \"choking episode.\"  He ruled out for ACS/MI and discharged home.  He did have a normal heart catheterization 2023.  Since that ED visit, patient has been doing well from a " "cardiac standpoint. He mason shave occasional dizziness when he first stands up, but this mason snot occur very often. He does drink about 5 bottles of water. BP at home is \"close to the same\" as it is here.     No Known Allergies      Current Outpatient Medications:     Accu-Chek Softclix Lancets lancets, USE AS DIRECTED TWICE DAILY AND AS NEEDED, Disp: , Rfl:     amLODIPine (NORVASC) 2.5 MG tablet, Take 1 tablet by mouth Daily., Disp: 90 tablet, Rfl: 3    ASPIRIN LOW DOSE 81 MG EC tablet, TAKE 1 TABLET BY MOUTH EVERY DAY, Disp: 30 tablet, Rfl: 0    atorvastatin (LIPITOR) 80 MG tablet, Take 1 tablet by mouth Daily., Disp: 90 tablet, Rfl: 3    BD Pen Needle Joyce 2nd Gen 32G X 4 MM misc, USE AS DIRECTED EVERY DAY, Disp: , Rfl:     citalopram (CeleXA) 40 MG tablet, Take 1 tablet by mouth Daily., Disp: , Rfl:     clopidogrel (PLAVIX) 75 MG tablet, Take 1 tablet by mouth Daily., Disp: 90 tablet, Rfl: 3    glipizide (GLUCOTROL) 5 MG tablet, Take 2 tablets by mouth 2 (Two) Times a Day Before Meals., Disp: , Rfl:     insulin glargine (LANTUS) 100 UNIT/ML injection, Inject 34 Units under the skin into the appropriate area as directed Daily., Disp: , Rfl:     isosorbide mononitrate (IMDUR) 30 MG 24 hr tablet, Take 1 tablet by mouth Daily., Disp: 903 tablet, Rfl: 3    levothyroxine (SYNTHROID, LEVOTHROID) 50 MCG tablet, Take 1 tablet by mouth Daily., Disp: , Rfl:     Loratadine 10 MG capsule, Take 1 capsule by mouth Daily., Disp: , Rfl:     nitroglycerin (NITROSTAT) 0.4 MG SL tablet, Place 1 tablet under the tongue Every 5 (Five) Minutes As Needed for Chest Pain. Take no more than 3 doses in 15 minutes., Disp: 50 tablet, Rfl: 1    omeprazole (priLOSEC) 40 MG capsule, Take 1 capsule by mouth Daily As Needed., Disp: , Rfl:     ranolazine (RANEXA) 1000 MG 12 hr tablet, Take 1 tablet by mouth Every 12 (Twelve) Hours., Disp: 180 tablet, Rfl: 3    Trulicity 0.75 MG/0.5ML solution pen-injector, ADMINISTER 0.75 MG UNDER THE SKIN 1 TIME " "WEEKLY, Disp: , Rfl:     The following portions of the patient's history were reviewed and updated as appropriate: allergies, current medications, past family history, past medical history, past social history, past surgical history and problem list.    Review of Systems   12 point ROS negative except for that listed in the HPI.        Objective:     /60 (BP Location: Left arm, Patient Position: Sitting)   Pulse 60   Ht 180.3 cm (71\")   Wt 92.4 kg (203 lb 9.6 oz)   SpO2 95%   BMI 28.40 kg/m²      Physical Exam  Constitutional: Patient appears well-developed and well-nourished.   HENT: HEENT exam unremarkable.   Neck: Neck supple. No JVD present.   Cardiovascular: Normal rate, regular rhythm and normal heart sounds. No murmur heard.   2+ symmetric pulses.   Pulmonary/Chest: Breath sounds normal. Does not exhibit tenderness.   Musculoskeletal: Does not exhibit edema.   Neurological: Neurological exam unremarkable.   Vitals reviewed.    Data Review:   Lab Results   Component Value Date    GLUCOSE 73 05/12/2024    BUN 20 05/12/2024    CREATININE 1.88 (H) 05/12/2024    EGFRIFNONA 45 (L) 12/02/2020    BCR 10.6 05/12/2024    K 4.8 05/12/2024    CO2 22.7 05/12/2024    CALCIUM 9.4 05/12/2024    ALBUMIN 4.5 05/12/2024    AST 35 05/12/2024    ALT 42 (H) 05/12/2024     Lab Results   Component Value Date    CHOL 161 04/13/2023    TRIG 131 04/13/2023    HDL 43 04/13/2023    LDL 95 04/13/2023      Lab Results   Component Value Date    WBC 8.29 05/12/2024    RBC 4.16 05/12/2024    HGB 13.8 05/12/2024    HCT 40.5 05/12/2024    MCV 97.4 (H) 05/12/2024     05/12/2024     No results found for: \"TSH\"  Lab Results   Component Value Date    HGBA1C 7.20 (H) 04/13/2023        Procedures         Advance Care Planning   ACP discussion was held with the patient during this visit. Patient does not have an advance directive, declines further assistance.             Assessment and plan:      Diagnosis Plan   1. Coronary artery " disease involving native coronary artery of native heart without angina pectoris  Stable without current angina. Normal [facility] 4/2023. No need for repeat ischemic evaluation. Continue aspirin and statin      2. Essential hypertension  Well controlled. Continue current antihypertensive regimen      3. Mixed hyperlipidemia  Continue statin therapy.         Continue current medications.   FU in 12 MO, sooner as needed.  Thank you for allowing us to participate in the care of your patient.       Charmaine Ramos PA-C      Please note that portions of this note may have been completed with a voice recognition program. Efforts were made to edit the dictations, but occasionally words are mistranscribed.

## 2024-07-16 ENCOUNTER — OFFICE VISIT (OUTPATIENT)
Age: 69
End: 2024-07-16
Payer: MEDICARE

## 2024-07-16 VITALS
WEIGHT: 203.6 LBS | BODY MASS INDEX: 28.5 KG/M2 | SYSTOLIC BLOOD PRESSURE: 106 MMHG | DIASTOLIC BLOOD PRESSURE: 60 MMHG | HEIGHT: 71 IN | OXYGEN SATURATION: 95 % | HEART RATE: 60 BPM

## 2024-07-16 DIAGNOSIS — I10 ESSENTIAL HYPERTENSION: ICD-10-CM

## 2024-07-16 DIAGNOSIS — E78.2 MIXED HYPERLIPIDEMIA: ICD-10-CM

## 2024-07-16 DIAGNOSIS — I25.10 CORONARY ARTERY DISEASE INVOLVING NATIVE CORONARY ARTERY OF NATIVE HEART WITHOUT ANGINA PECTORIS: Primary | ICD-10-CM

## 2024-07-16 RX ORDER — RANOLAZINE 1000 MG/1
1000 TABLET, EXTENDED RELEASE ORAL EVERY 12 HOURS SCHEDULED
Qty: 180 TABLET | Refills: 3 | Status: SHIPPED | OUTPATIENT
Start: 2024-07-16

## 2024-07-16 RX ORDER — NITROGLYCERIN 0.4 MG/1
0.4 TABLET SUBLINGUAL
Qty: 50 TABLET | Refills: 1 | Status: SHIPPED | OUTPATIENT
Start: 2024-07-16

## 2024-09-19 NOTE — PROGRESS NOTES
Discharge Planning Assessment  [facility]     Patient Name: Grant Ann  MRN: [id number]  Today's Date: 4/22/2017    Admit Date: 4/21/2017          Discharge Needs Assessment       04/22/17 0944    Living Environment    Lives With spouse    Living Arrangements house    Provides Primary Care For no one    Quality Of Family Relationships supportive;helpful;involved    Able to Return to Prior Living Arrangements yes    Discharge Needs Assessment    Concerns To Be Addressed discharge planning concerns    Readmission Within The Last 30 Days no previous admission in last 30 days    Anticipated Changes Related to Illness inability to care for self    Equipment Currently Used at Home none    Equipment Needed After Discharge commode;walker, rolling    Discharge Facility/Level Of Care Needs home with home health    Transportation Available car            Discharge Plan       04/22/17 0919    Case Management/Social Work Plan    Plan Return home    Patient/Family In Agreement With Plan yes    Additional Comments Pt lives in [place] with his wife. He reports he was independent with all ADLs prior to admit. He denies use of drug/alcohol abuse or history of falls. He is followed by his PCP and reports his medications are covered by insurance. This stay is covered under pts workers comp. At this time he does not have the  for his case name or number.  Without this information HH or DME cannot be covered because workers comp is the group that approves that. Suki from [employer] has agreed to provide a rolling walker and bedside commode until workers comp can be contacted and pt has agreed to provide their credit card number to her to ensure payment. CM will have to follow up on Monday to arrange HH. Pt has agreed to this.    Final Note    Final Note Home        Discharge Placement     No information found        Expected Discharge Date and Time     Expected Discharge Date Expected Discharge Time    Apr 22, 2017                Demographic Summary     None            Functional Status     None            Psychosocial     None            Abuse/Neglect     None            Legal     None            Substance Abuse     None            Patient Forms     None          Mirta Hawk RN     [Follow-Up Visit] : a follow-up visit for [Workers' Comp: Date of Injury: _____] : This visit is related to worker's compensation. Date of Injury: [unfilled] [Artificial Knee Joint] : an artificial knee joint [FreeTextEntry2] : RIGHT KNEE WORKERS COMP F/U. RIGHT KNEE REVISON DONE 6/2023

## 2024-11-11 RX ORDER — RANOLAZINE 1000 MG/1
1000 TABLET, EXTENDED RELEASE ORAL EVERY 12 HOURS SCHEDULED
Qty: 180 TABLET | Refills: 3 | Status: SHIPPED | OUTPATIENT
Start: 2024-11-11

## 2025-06-09 ENCOUNTER — HOSPITAL ENCOUNTER (EMERGENCY)
Facility: HOSPITAL | Age: 70
Discharge: HOME | End: 2025-06-09
Attending: EMERGENCY MEDICINE | Admitting: EMERGENCY MEDICINE
Payer: MEDICARE

## 2025-06-09 ENCOUNTER — APPOINTMENT (OUTPATIENT)
Dept: GENERAL RADIOLOGY | Facility: HOSPITAL | Age: 70
End: 2025-06-09
Payer: MEDICARE

## 2025-06-09 VITALS
WEIGHT: 195 LBS | DIASTOLIC BLOOD PRESSURE: 69 MMHG | HEART RATE: 60 BPM | BODY MASS INDEX: 27.3 KG/M2 | SYSTOLIC BLOOD PRESSURE: 124 MMHG | OXYGEN SATURATION: 99 % | RESPIRATION RATE: 18 BRPM | HEIGHT: 71 IN | TEMPERATURE: 98.2 F

## 2025-06-09 DIAGNOSIS — J98.8 VIRAL RESPIRATORY INFECTION: Primary | ICD-10-CM

## 2025-06-09 DIAGNOSIS — B97.89 VIRAL RESPIRATORY INFECTION: Primary | ICD-10-CM

## 2025-06-09 LAB
ALBUMIN SERPL-MCNC: 3.8 G/DL (ref 3.5–5.2)
ALBUMIN/GLOB SERPL: 1.6 G/DL
ALP SERPL-CCNC: 91 U/L (ref 39–117)
ALT SERPL W P-5'-P-CCNC: 30 U/L (ref 1–41)
ANION GAP SERPL CALCULATED.3IONS-SCNC: 11.5 MMOL/L (ref 5–15)
AST SERPL-CCNC: 27 U/L (ref 1–40)
B PARAPERT DNA SPEC QL NAA+PROBE: NOT DETECTED
B PERT DNA SPEC QL NAA+PROBE: NOT DETECTED
BASOPHILS # BLD AUTO: 0.04 10*3/MM3 (ref 0–0.2)
BASOPHILS NFR BLD AUTO: 0.3 % (ref 0–1.5)
BILIRUB SERPL-MCNC: 0.4 MG/DL (ref 0–1.2)
BUN SERPL-MCNC: 27.3 MG/DL (ref 8–23)
BUN/CREAT SERPL: 21 (ref 7–25)
C PNEUM DNA NPH QL NAA+NON-PROBE: NOT DETECTED
CALCIUM SPEC-SCNC: 9 MG/DL (ref 8.6–10.5)
CHLORIDE SERPL-SCNC: 105 MMOL/L (ref 98–107)
CO2 SERPL-SCNC: 21.5 MMOL/L (ref 22–29)
CREAT SERPL-MCNC: 1.3 MG/DL (ref 0.76–1.27)
CRP SERPL-MCNC: 1.54 MG/DL (ref 0–0.5)
D DIMER PPP FEU-MCNC: 0.66 MCGFEU/ML (ref 0–0.67)
D-LACTATE SERPL-SCNC: 1 MMOL/L (ref 0.5–2)
DEPRECATED RDW RBC AUTO: 43.7 FL (ref 37–54)
EGFRCR SERPLBLD CKD-EPI 2021: 60.2 ML/MIN/1.73
EOSINOPHIL # BLD AUTO: 0.04 10*3/MM3 (ref 0–0.4)
EOSINOPHIL NFR BLD AUTO: 0.3 % (ref 0.3–6.2)
ERYTHROCYTE [DISTWIDTH] IN BLOOD BY AUTOMATED COUNT: 12 % (ref 12.3–15.4)
ERYTHROCYTE [SEDIMENTATION RATE] IN BLOOD: 13 MM/HR (ref 0–20)
FLUAV SUBTYP SPEC NAA+PROBE: NOT DETECTED
FLUBV RNA ISLT QL NAA+PROBE: NOT DETECTED
GEN 5 1HR TROPONIN T REFLEX: 14 NG/L
GLOBULIN UR ELPH-MCNC: 2.4 GM/DL
GLUCOSE SERPL-MCNC: 192 MG/DL (ref 65–99)
HADV DNA SPEC NAA+PROBE: NOT DETECTED
HCOV 229E RNA SPEC QL NAA+PROBE: NOT DETECTED
HCOV HKU1 RNA SPEC QL NAA+PROBE: NOT DETECTED
HCOV NL63 RNA SPEC QL NAA+PROBE: NOT DETECTED
HCOV OC43 RNA SPEC QL NAA+PROBE: NOT DETECTED
HCT VFR BLD AUTO: 35.8 % (ref 37.5–51)
HGB BLD-MCNC: 11.8 G/DL (ref 13–17.7)
HMPV RNA NPH QL NAA+NON-PROBE: NOT DETECTED
HOLD SPECIMEN: NORMAL
HOLD SPECIMEN: NORMAL
HPIV1 RNA ISLT QL NAA+PROBE: NOT DETECTED
HPIV2 RNA SPEC QL NAA+PROBE: NOT DETECTED
HPIV3 RNA NPH QL NAA+PROBE: DETECTED
HPIV4 P GENE NPH QL NAA+PROBE: NOT DETECTED
IMM GRANULOCYTES # BLD AUTO: 0.46 10*3/MM3 (ref 0–0.05)
IMM GRANULOCYTES NFR BLD AUTO: 3.8 % (ref 0–0.5)
LYMPHOCYTES # BLD AUTO: 1.64 10*3/MM3 (ref 0.7–3.1)
LYMPHOCYTES NFR BLD AUTO: 13.7 % (ref 19.6–45.3)
M PNEUMO IGG SER IA-ACNC: NOT DETECTED
MAGNESIUM SERPL-MCNC: 1.4 MG/DL (ref 1.6–2.4)
MCH RBC QN AUTO: 32.4 PG (ref 26.6–33)
MCHC RBC AUTO-ENTMCNC: 33 G/DL (ref 31.5–35.7)
MCV RBC AUTO: 98.4 FL (ref 79–97)
MONOCYTES # BLD AUTO: 0.98 10*3/MM3 (ref 0.1–0.9)
MONOCYTES NFR BLD AUTO: 8.2 % (ref 5–12)
NEUTROPHILS NFR BLD AUTO: 73.7 % (ref 42.7–76)
NEUTROPHILS NFR BLD AUTO: 8.85 10*3/MM3 (ref 1.7–7)
NRBC BLD AUTO-RTO: 0 /100 WBC (ref 0–0.2)
NT-PROBNP SERPL-MCNC: 546 PG/ML (ref 0–900)
PLATELET # BLD AUTO: 254 10*3/MM3 (ref 140–450)
PMV BLD AUTO: 10.2 FL (ref 6–12)
POTASSIUM SERPL-SCNC: 4.5 MMOL/L (ref 3.5–5.2)
PROCALCITONIN SERPL-MCNC: 0.08 NG/ML (ref 0–0.25)
PROT SERPL-MCNC: 6.2 G/DL (ref 6–8.5)
RBC # BLD AUTO: 3.64 10*6/MM3 (ref 4.14–5.8)
RHINOVIRUS RNA SPEC NAA+PROBE: NOT DETECTED
RSV RNA NPH QL NAA+NON-PROBE: NOT DETECTED
SARS-COV-2 RNA RESP QL NAA+PROBE: NOT DETECTED
SODIUM SERPL-SCNC: 138 MMOL/L (ref 136–145)
TROPONIN T NUMERIC DELTA: -5 NG/L
TROPONIN T SERPL HS-MCNC: 19 NG/L
WBC NRBC COR # BLD AUTO: 12.01 10*3/MM3 (ref 3.4–10.8)
WHOLE BLOOD HOLD COAG: NORMAL
WHOLE BLOOD HOLD SPECIMEN: NORMAL

## 2025-06-09 PROCEDURE — 93005 ELECTROCARDIOGRAM TRACING: CPT | Performed by: PHYSICIAN ASSISTANT

## 2025-06-09 PROCEDURE — 85025 COMPLETE CBC W/AUTO DIFF WBC: CPT | Performed by: PHYSICIAN ASSISTANT

## 2025-06-09 PROCEDURE — 87147 CULTURE TYPE IMMUNOLOGIC: CPT | Performed by: PHYSICIAN ASSISTANT

## 2025-06-09 PROCEDURE — 86140 C-REACTIVE PROTEIN: CPT | Performed by: PHYSICIAN ASSISTANT

## 2025-06-09 PROCEDURE — 71045 X-RAY EXAM CHEST 1 VIEW: CPT | Performed by: RADIOLOGY

## 2025-06-09 PROCEDURE — 85379 FIBRIN DEGRADATION QUANT: CPT | Performed by: PHYSICIAN ASSISTANT

## 2025-06-09 PROCEDURE — 83735 ASSAY OF MAGNESIUM: CPT | Performed by: PHYSICIAN ASSISTANT

## 2025-06-09 PROCEDURE — 84145 PROCALCITONIN (PCT): CPT | Performed by: PHYSICIAN ASSISTANT

## 2025-06-09 PROCEDURE — 25810000003 SODIUM CHLORIDE 0.9 % SOLUTION: Performed by: PHYSICIAN ASSISTANT

## 2025-06-09 PROCEDURE — 87040 BLOOD CULTURE FOR BACTERIA: CPT | Performed by: PHYSICIAN ASSISTANT

## 2025-06-09 PROCEDURE — 80053 COMPREHEN METABOLIC PANEL: CPT | Performed by: PHYSICIAN ASSISTANT

## 2025-06-09 PROCEDURE — 85652 RBC SED RATE AUTOMATED: CPT | Performed by: PHYSICIAN ASSISTANT

## 2025-06-09 PROCEDURE — 83605 ASSAY OF LACTIC ACID: CPT | Performed by: PHYSICIAN ASSISTANT

## 2025-06-09 PROCEDURE — 87154 CUL TYP ID BLD PTHGN 6+ TRGT: CPT | Performed by: PHYSICIAN ASSISTANT

## 2025-06-09 PROCEDURE — 0202U NFCT DS 22 TRGT SARS-COV-2: CPT | Performed by: PHYSICIAN ASSISTANT

## 2025-06-09 PROCEDURE — 96365 THER/PROPH/DIAG IV INF INIT: CPT

## 2025-06-09 PROCEDURE — 25010000002 CEFTRIAXONE PER 250 MG: Performed by: PHYSICIAN ASSISTANT

## 2025-06-09 PROCEDURE — 36415 COLL VENOUS BLD VENIPUNCTURE: CPT

## 2025-06-09 PROCEDURE — 99284 EMERGENCY DEPT VISIT MOD MDM: CPT

## 2025-06-09 PROCEDURE — 84484 ASSAY OF TROPONIN QUANT: CPT | Performed by: PHYSICIAN ASSISTANT

## 2025-06-09 PROCEDURE — 83880 ASSAY OF NATRIURETIC PEPTIDE: CPT | Performed by: PHYSICIAN ASSISTANT

## 2025-06-09 PROCEDURE — 71045 X-RAY EXAM CHEST 1 VIEW: CPT

## 2025-06-09 RX ORDER — BENZONATATE 200 MG/1
200 CAPSULE ORAL 3 TIMES DAILY PRN
Qty: 15 CAPSULE | Refills: 0 | Status: SHIPPED | OUTPATIENT
Start: 2025-06-09

## 2025-06-09 RX ORDER — SODIUM CHLORIDE 0.9 % (FLUSH) 0.9 %
10 SYRINGE (ML) INJECTION AS NEEDED
Status: DISCONTINUED | OUTPATIENT
Start: 2025-06-09 | End: 2025-06-09 | Stop reason: HOSPADM

## 2025-06-09 RX ORDER — BENZONATATE 100 MG/1
200 CAPSULE ORAL ONCE
Status: COMPLETED | OUTPATIENT
Start: 2025-06-09 | End: 2025-06-09

## 2025-06-09 RX ADMIN — SODIUM CHLORIDE 2000 MG: 9 INJECTION, SOLUTION INTRAVENOUS at 08:52

## 2025-06-09 RX ADMIN — Medication 800 MG: at 09:58

## 2025-06-09 RX ADMIN — BENZONATATE 200 MG: 100 CAPSULE ORAL at 08:52

## 2025-06-09 RX ADMIN — SODIUM CHLORIDE 1000 ML: 9 INJECTION, SOLUTION INTRAVENOUS at 08:52

## 2025-06-09 NOTE — DISCHARGE INSTRUCTIONS
Rest at home, make sure you are drinking plenty of fluids.  Finish your steroids and antibiotics as prescribed.  Use your inhaler as prescribed.  A prescription for cough medication has been sent to the pharmacy.  You can take this as needed.  Return to the ED at any time if symptoms change or worsen.

## 2025-06-09 NOTE — ED PROVIDER NOTES
Subjective   History of Present Illness  69-year-old male who presents to the ED today for cough and fever.  He states he has had the symptoms for a little over a week.  He states his cough is dry and he has had a fever up to 102.  He states he also has had stuffy nose and sore throat.  He states he has some mild shortness of breath.  He does have some chest pain in the center of his chest but reports that he has episodes of chest pain at baseline.  He denies any nausea, vomiting or diarrhea.  He saw his primary care provider 3 days ago and was started on steroids, doxycycline and an inhaler.  He states he has not ran a fever since starting the medications but states that he is still having a lot of cough.  He does not smoke.  He does not wear home oxygen.  He denies any recent travel or known sick contacts.  The patient does have a history of diabetes and coronary artery disease.  He also has a history of osteoarthritis.  He has a history of hypertension, hyperlipidemia, GERD, hypothyroidism and chronic kidney disease.    History provided by:  Patient  URI  Presenting symptoms: congestion, cough and fever    Severity:  Moderate  Onset quality:  Gradual  Duration: over a week.  Timing:  Constant  Progression:  Unchanged  Chronicity:  New  Relieved by:  Nothing  Worsened by:  Nothing  Risk factors: chronic cardiac disease, chronic kidney disease and diabetes mellitus    Risk factors: no recent travel and no sick contacts        Review of Systems   Constitutional:  Positive for fever.   HENT:  Positive for congestion. Negative for trouble swallowing and voice change.    Eyes: Negative.    Respiratory:  Positive for cough and shortness of breath.    Cardiovascular:  Positive for chest pain.   Gastrointestinal: Negative.    Genitourinary: Negative.    Musculoskeletal: Negative.    Skin: Negative.    Neurological: Negative.    Psychiatric/Behavioral: Negative.     All other systems reviewed and are negative.      Past  Medical History:   Diagnosis Date    Arthritis     Depression     Disease of thyroid gland     Essential hypertension 11/19/2020    GERD (gastroesophageal reflux disease)     Recurrent boils     Wears glasses     Wears partial dentures        No Known Allergies    Past Surgical History:   Procedure Laterality Date    CARDIAC CATHETERIZATION      CARDIAC CATHETERIZATION Left 12/02/2020    Procedure: Left Heart Cath;  Surgeon: Casper Stoll MD;  Location:  [facility];  Service: Cardiology;  Laterality: Left;    CARDIAC CATHETERIZATION N/A 4/13/2023    Procedure: Left Heart Cath;  Surgeon: Casper Stoll MD;  Location:  [facility];  Service: Cardiology;  Laterality: N/A;    COLONOSCOPY  08/2022    FL UPPER GI ESOPHAGRAM  08/2022    KNEE ARTHROSCOPY      KNEE ARTHROSCOPY W/ MENISCAL REPAIR Right 11/18/2016    LUMBAR DISCECTOMY Left 04/21/2017    Procedure: LEFT LUMBAR FORAMINOTOMY DISCECTOMY FAR LATERAL L3-4;  Surgeon: Norberto Puente MD;  Location:  [facility] OR;  Service:        Family History   Problem Relation Age of Onset    No Known Problems Mother     Heart disease Father     Heart failure Father        Social History     Socioeconomic History    Marital status:    Tobacco Use    Smoking status: Never     Passive exposure: Past    Smokeless tobacco: Never   Vaping Use    Vaping status: Never Used   Substance and Sexual Activity    Alcohol use: No    Drug use: No    Sexual activity: Defer           Objective   Physical Exam  Vitals and nursing note reviewed.   Constitutional:       General: He is not in acute distress.     Appearance: Normal appearance. He is not diaphoretic.   HENT:      Head: Normocephalic and atraumatic.      Right Ear: External ear normal.      Left Ear: External ear normal.      Nose: Congestion present.      Mouth/Throat:      Mouth: Mucous membranes are moist.      Pharynx: Oropharynx is clear.   Eyes:      Extraocular Movements: Extraocular movements  intact.      Conjunctiva/sclera: Conjunctivae normal.      Pupils: Pupils are equal, round, and reactive to light.   Cardiovascular:      Rate and Rhythm: Regular rhythm. Bradycardia present.      Pulses: Normal pulses.      Heart sounds: Normal heart sounds.   Pulmonary:      Effort: Pulmonary effort is normal. No respiratory distress.      Breath sounds: Normal breath sounds. No stridor. No wheezing, rhonchi or rales.      Comments: Has dry cough  Chest:      Chest wall: No tenderness.   Abdominal:      General: Bowel sounds are normal.      Palpations: Abdomen is soft.      Tenderness: There is no abdominal tenderness.   Musculoskeletal:         General: Normal range of motion.      Cervical back: Normal range of motion and neck supple.      Right lower leg: No edema.      Left lower leg: No edema.   Skin:     General: Skin is warm and dry.      Capillary Refill: Capillary refill takes less than 2 seconds.   Neurological:      General: No focal deficit present.      Mental Status: He is alert and oriented to person, place, and time.   Psychiatric:         Mood and Affect: Mood normal.         Procedures       Results for orders placed or performed during the hospital encounter of 06/09/25   Comprehensive Metabolic Panel    Collection Time: 06/09/25  8:45 AM    Specimen: Arm, Left; Blood   Result Value Ref Range    Glucose 192 (H) 65 - 99 mg/dL    BUN 27.3 (H) 8.0 - 23.0 mg/dL    Creatinine 1.30 (H) 0.76 - 1.27 mg/dL    Sodium 138 136 - 145 mmol/L    Potassium 4.5 3.5 - 5.2 mmol/L    Chloride 105 98 - 107 mmol/L    CO2 21.5 (L) 22.0 - 29.0 mmol/L    Calcium 9.0 8.6 - 10.5 mg/dL    Total Protein 6.2 6.0 - 8.5 g/dL    Albumin 3.8 3.5 - 5.2 g/dL    ALT (SGPT) 30 1 - 41 U/L    AST (SGOT) 27 1 - 40 U/L    Alkaline Phosphatase 91 39 - 117 U/L    Total Bilirubin 0.4 0.0 - 1.2 mg/dL    Globulin 2.4 gm/dL    A/G Ratio 1.6 g/dL    BUN/Creatinine Ratio 21.0 7.0 - 25.0    Anion Gap 11.5 5.0 - 15.0 mmol/L    eGFR 60.2 >60.0  mL/min/1.73   BNP    Collection Time: 06/09/25  8:45 AM    Specimen: Arm, Left; Blood   Result Value Ref Range    proBNP 546.0 0.0 - 900.0 pg/mL   High Sensitivity Troponin T    Collection Time: 06/09/25  8:45 AM    Specimen: Arm, Left; Blood   Result Value Ref Range    HS Troponin T 19 <22 ng/L   Lactic Acid, Plasma    Collection Time: 06/09/25  8:45 AM    Specimen: Arm, Left; Blood   Result Value Ref Range    Lactate 1.0 0.5 - 2.0 mmol/L   Procalcitonin    Collection Time: 06/09/25  8:45 AM    Specimen: Arm, Left; Blood   Result Value Ref Range    Procalcitonin 0.08 0.00 - 0.25 ng/mL   Sedimentation Rate    Collection Time: 06/09/25  8:45 AM    Specimen: Arm, Left; Blood   Result Value Ref Range    Sed Rate 13 0 - 20 mm/hr   C-reactive Protein    Collection Time: 06/09/25  8:45 AM    Specimen: Arm, Left; Blood   Result Value Ref Range    C-Reactive Protein 1.54 (H) 0.00 - 0.50 mg/dL   Magnesium    Collection Time: 06/09/25  8:45 AM    Specimen: Arm, Left; Blood   Result Value Ref Range    Magnesium 1.4 (L) 1.6 - 2.4 mg/dL   CBC Auto Differential    Collection Time: 06/09/25  8:45 AM    Specimen: Arm, Left; Blood   Result Value Ref Range    WBC 12.01 (H) 3.40 - 10.80 10*3/mm3    RBC 3.64 (L) 4.14 - 5.80 10*6/mm3    Hemoglobin 11.8 (L) 13.0 - 17.7 g/dL    Hematocrit 35.8 (L) 37.5 - 51.0 %    MCV 98.4 (H) 79.0 - 97.0 fL    MCH 32.4 26.6 - 33.0 pg    MCHC 33.0 31.5 - 35.7 g/dL    RDW 12.0 (L) 12.3 - 15.4 %    RDW-SD 43.7 37.0 - 54.0 fl    MPV 10.2 6.0 - 12.0 fL    Platelets 254 140 - 450 10*3/mm3    Neutrophil % 73.7 42.7 - 76.0 %    Lymphocyte % 13.7 (L) 19.6 - 45.3 %    Monocyte % 8.2 5.0 - 12.0 %    Eosinophil % 0.3 0.3 - 6.2 %    Basophil % 0.3 0.0 - 1.5 %    Immature Grans % 3.8 (H) 0.0 - 0.5 %    Neutrophils, Absolute 8.85 (H) 1.70 - 7.00 10*3/mm3    Lymphocytes, Absolute 1.64 0.70 - 3.10 10*3/mm3    Monocytes, Absolute 0.98 (H) 0.10 - 0.90 10*3/mm3    Eosinophils, Absolute 0.04 0.00 - 0.40 10*3/mm3     Basophils, Absolute 0.04 0.00 - 0.20 10*3/mm3    Immature Grans, Absolute 0.46 (H) 0.00 - 0.05 10*3/mm3    nRBC 0.0 0.0 - 0.2 /100 WBC   D-dimer, Quantitative    Collection Time: 06/09/25  8:45 AM    Specimen: Arm, Left; Blood   Result Value Ref Range    D-Dimer, Quantitative 0.66 0.00 - 0.67 MCGFEU/mL   Green Top (Gel)    Collection Time: 06/09/25  8:45 AM   Result Value Ref Range    Extra Tube Hold for add-ons.    Lavender Top    Collection Time: 06/09/25  8:45 AM   Result Value Ref Range    Extra Tube hold for add-on    Gold Top - SST    Collection Time: 06/09/25  8:45 AM   Result Value Ref Range    Extra Tube Hold for add-ons.    Light Blue Top    Collection Time: 06/09/25  8:45 AM   Result Value Ref Range    Extra Tube Hold for add-ons.    Respiratory Panel PCR w/COVID-19(SARS-CoV-2) [facility] In-House, NP Swab in [facility], 2 HR TAT - Swab, Nasopharynx    Collection Time: 06/09/25  8:49 AM    Specimen: Nasopharynx; Swab   Result Value Ref Range    ADENOVIRUS, PCR Not Detected Not Detected    Coronavirus 229E Not Detected Not Detected    Coronavirus HKU1 Not Detected Not Detected    Coronavirus NL63 Not Detected Not Detected    Coronavirus OC43 Not Detected Not Detected    COVID19 Not Detected Not Detected - Ref. Range    Human Metapneumovirus Not Detected Not Detected    Human Rhinovirus/Enterovirus Not Detected Not Detected    Influenza A PCR Not Detected Not Detected    Influenza B PCR Not Detected Not Detected    Parainfluenza Virus 1 Not Detected Not Detected    Parainfluenza Virus 2 Not Detected Not Detected    Parainfluenza Virus 3 Detected (A) Not Detected    Parainfluenza Virus 4 Not Detected Not Detected    RSV, PCR Not Detected Not Detected    Bordetella pertussis pcr Not Detected Not Detected    Bordetella parapertussis PCR Not Detected Not Detected    Chlamydophila pneumoniae PCR Not Detected Not Detected    Mycoplasma pneumo by PCR Not Detected Not Detected   ECG 12 Lead Dyspnea     Collection Time: 06/09/25  9:03 AM   Result Value Ref Range    QT Interval 452 ms    QTC Interval 447 ms   High Sensitivity Troponin T 1Hr    Collection Time: 06/09/25 10:00 AM    Specimen: Blood   Result Value Ref Range    HS Troponin T 14 <22 ng/L    Troponin T Numeric Delta -5 Abnormal if >/=3 ng/L          ED Course  ED Course as of 06/09/25 1103   Mon Jun 09, 2025   0855 WBC(!): 12.01 [AH]   0920 C-Reactive Protein(!): 1.54 [AH]   0921 Magnesium(!): 1.4 [AH]   0930 XR Chest 1 View  FINDINGS:    Lungs and pleural spaces:  Unremarkable as visualized.  No  consolidation.  No pneumothorax.    Heart:  Unremarkable as visualized.  No cardiomegaly.    Mediastinum:  Unremarkable as visualized.  Normal mediastinal contour.    Bones/joints:  Unremarkable as visualized.  No acute fracture.     IMPRESSION:    Unremarkable exam. No acute cardiopulmonary findings identified.   [AH]   0941 Parainfluenza Virus 3(!): Detected [AH]   0955 ECG 12 Lead Dyspnea  Vent. Rate :  59 BPM     Atrial Rate :  59 BPM     P-R Int : 166 ms          QRS Dur : 118 ms      QT Int : 452 ms       P-R-T Axes :  61 -49  63 degrees    QTcB Int : 447 ms     Sinus bradycardia  Left anterior fascicular block  Septal infarct (cited on or before 20-Apr-2017)  Abnormal ECG  When compared with ECG of 13-May-2024 02:36,  No significant change was found   [ES]      ED Course User Index  [AH] Mary Mcguire PA  [ES] Romie Renner MD                                                       Medical Decision Making  69-year-old male presents to the ED today for a week of cough and fever.  He saw his primary care provider 3 days ago and has been on antibiotics, steroids and inhaler.  Chest x-ray shows no acute abnormalities.  Respiratory panel is positive for parainfluenza.  Patient has been afebrile throughout his ED stay.  EKG is stable with no acute ischemia.  Serial troponins are stable.  D-dimer is negative.  proBNP is negative.  Plan is for  discharge home.  He was instructed to continue his medications as prescribed and I will add Tessalon for cough.  He will follow-up outpatient and will return to the ED if symptoms change or worsen.    Problems Addressed:  Viral respiratory infection: complicated acute illness or injury    Amount and/or Complexity of Data Reviewed  Labs: ordered. Decision-making details documented in ED Course.  Radiology: ordered. Decision-making details documented in ED Course.  ECG/medicine tests: ordered. Decision-making details documented in ED Course.    Risk  OTC drugs.  Prescription drug management.        Final diagnoses:   Viral respiratory infection       ED Disposition  ED Disposition       ED Disposition   Discharge    Condition   Stable    Comment   --               Lizbeth Hernandez, APRN  [address] [facility] [address] [phone]    Schedule an appointment as soon as possible for a visit in 3 days           Medication List        New Prescriptions      benzonatate 200 MG capsule  Commonly known as: TESSALON  Take 1 capsule by mouth 3 (Three) Times a Day As Needed for Cough.               Where to Get Your Medications        These medications were sent to [facility] [address] [place] [address] - [address] [place] [address] - [phone] [facility] [phone]   [address] [facility] [address]      Phone: [phone]   benzonatate 200 MG capsule            Mary Mcguire PA  06/09/25 2208

## 2025-06-10 LAB
BACTERIA BLD CULT: ABNORMAL
BOTTLE TYPE: ABNORMAL
QT INTERVAL: 452 MS
QTC INTERVAL: 447 MS

## 2025-06-11 LAB
BACTERIA SPEC AEROBE CULT: ABNORMAL
GRAM STN SPEC: ABNORMAL
ISOLATED FROM: ABNORMAL

## 2025-06-14 LAB — BACTERIA SPEC AEROBE CULT: NORMAL

## 2025-07-17 ENCOUNTER — TELEPHONE (OUTPATIENT)
Dept: CARDIOLOGY | Facility: CLINIC | Age: 70
End: 2025-07-17
Payer: MEDICARE

## 2025-07-17 NOTE — TELEPHONE ENCOUNTER
Lvm for pt letting him know the new address for the [facility] for his appt w Dr. Stoll on 8/5/25. Mailed out the appt reminder paper along w the appt reminder paper.     Ok for [facility] to relay new address.

## 2025-08-05 ENCOUNTER — OFFICE VISIT (OUTPATIENT)
Age: 70
End: 2025-08-05
Payer: MEDICARE

## 2025-08-05 VITALS
HEIGHT: 71 IN | SYSTOLIC BLOOD PRESSURE: 112 MMHG | BODY MASS INDEX: 27.4 KG/M2 | HEART RATE: 54 BPM | WEIGHT: 195.7 LBS | DIASTOLIC BLOOD PRESSURE: 60 MMHG | OXYGEN SATURATION: 96 %

## 2025-08-05 DIAGNOSIS — E78.2 MIXED HYPERLIPIDEMIA: ICD-10-CM

## 2025-08-05 DIAGNOSIS — I25.118 CORONARY ARTERY DISEASE INVOLVING NATIVE CORONARY ARTERY OF NATIVE HEART WITH OTHER FORM OF ANGINA PECTORIS: Primary | ICD-10-CM

## 2025-08-05 DIAGNOSIS — I10 ESSENTIAL HYPERTENSION: ICD-10-CM

## 2025-08-05 DIAGNOSIS — I25.10 CORONARY ARTERY DISEASE INVOLVING NATIVE CORONARY ARTERY OF NATIVE HEART WITHOUT ANGINA PECTORIS: Primary | ICD-10-CM

## 2025-08-05 PROCEDURE — 99214 OFFICE O/P EST MOD 30 MIN: CPT | Performed by: INTERNAL MEDICINE

## 2025-08-05 PROCEDURE — 3074F SYST BP LT 130 MM HG: CPT | Performed by: INTERNAL MEDICINE

## 2025-08-05 PROCEDURE — 3078F DIAST BP <80 MM HG: CPT | Performed by: INTERNAL MEDICINE

## 2025-08-05 PROCEDURE — 1159F MED LIST DOCD IN RCRD: CPT | Performed by: INTERNAL MEDICINE

## 2025-08-05 PROCEDURE — 1160F RVW MEDS BY RX/DR IN RCRD: CPT | Performed by: INTERNAL MEDICINE

## 2025-08-05 RX ORDER — RANOLAZINE 1000 MG/1
1000 TABLET, EXTENDED RELEASE ORAL EVERY 12 HOURS SCHEDULED
Qty: 180 TABLET | Refills: 3 | Status: SHIPPED | OUTPATIENT
Start: 2025-08-05

## 2025-08-14 ENCOUNTER — HOSPITAL ENCOUNTER (OUTPATIENT)
Dept: NUCLEAR MEDICINE | Facility: HOSPITAL | Age: 70
Discharge: HOME | End: 2025-08-14
Payer: MEDICARE

## 2025-08-14 ENCOUNTER — HOSPITAL ENCOUNTER (OUTPATIENT)
Dept: CARDIOLOGY | Facility: HOSPITAL | Age: 70
Discharge: HOME | End: 2025-08-14
Payer: MEDICARE

## 2025-08-14 DIAGNOSIS — I25.10 CORONARY ARTERY DISEASE INVOLVING NATIVE CORONARY ARTERY OF NATIVE HEART WITHOUT ANGINA PECTORIS: ICD-10-CM

## 2025-08-14 LAB
BH CV NUCLEAR PRIOR STUDY: 3
BH CV REST NUCLEAR ISOTOPE DOSE: 10.6 MCI
BH CV STRESS COMMENTS STAGE 1: NORMAL
BH CV STRESS DOSE REGADENOSON STAGE 1: 0.4
BH CV STRESS DURATION MIN STAGE 1: 0
BH CV STRESS DURATION SEC STAGE 1: 10
BH CV STRESS HR STAGE 1: 58
BH CV STRESS NUCLEAR ISOTOPE DOSE: 30.8 MCI
BH CV STRESS PROTOCOL 1: NORMAL
BH CV STRESS RECOVERY BP: NORMAL MMHG
BH CV STRESS RECOVERY HR: 64 BPM
BH CV STRESS STAGE 1: 1
MAXIMAL PREDICTED HEART RATE: 153 BPM
PERCENT MAX PREDICTED HR: 37.91 %
SPECT HRT GATED+EF W RNC IV: 72 %
STRESS BASELINE BP: NORMAL MMHG
STRESS BASELINE HR: 48 BPM
STRESS PERCENT HR: 45 %
STRESS POST PEAK BP: NORMAL MMHG
STRESS POST PEAK HR: 58 BPM
STRESS TARGET HR: 130 BPM

## 2025-08-14 PROCEDURE — 34310000005 TECHNETIUM SESTAMIBI: Performed by: INTERNAL MEDICINE

## 2025-08-14 PROCEDURE — A9500 TC99M SESTAMIBI: HCPCS | Performed by: INTERNAL MEDICINE

## 2025-08-14 PROCEDURE — 93017 CV STRESS TEST TRACING ONLY: CPT

## 2025-08-14 PROCEDURE — 78452 HT MUSCLE IMAGE SPECT MULT: CPT

## 2025-08-14 RX ORDER — REGADENOSON 0.08 MG/ML
0.4 INJECTION, SOLUTION INTRAVENOUS
Status: DISCONTINUED | OUTPATIENT
Start: 2025-08-14 | End: 2025-08-15 | Stop reason: HOSPADM

## 2025-08-14 RX ADMIN — TECHNETIUM TC 99M SESTAMIBI 1 DOSE: 1 INJECTION INTRAVENOUS at 09:10

## 2025-08-14 RX ADMIN — TECHNETIUM TC 99M SESTAMIBI 1 DOSE: 1 INJECTION INTRAVENOUS at 09:25

## 2025-08-19 ENCOUNTER — RESULTS FOLLOW-UP (OUTPATIENT)
Dept: CARDIOLOGY | Facility: CLINIC | Age: 70
End: 2025-08-19
Payer: MEDICARE

## 2025-08-19 DIAGNOSIS — I25.10 CORONARY ARTERY DISEASE INVOLVING NATIVE CORONARY ARTERY OF NATIVE HEART WITHOUT ANGINA PECTORIS: Primary | ICD-10-CM

## (undated) DEVICE — SPNG GZ STRL 2S 4X4 12PLY

## (undated) DEVICE — ENCORE® LATEX MICRO SIZE 7.5, STERILE LATEX POWDER-FREE SURGICAL GLOVE: Brand: ENCORE

## (undated) DEVICE — DEV COMP RAD PRELUDESYNC 24CM

## (undated) DEVICE — MODEL BT2000 P/N 700287-012KIT CONTENTS: MANIFOLD WITH SALINE AND CONTRAST PORTS, SALINE TUBING WITH SPIKE AND HAND SYRINGE, TRANSDUCER: Brand: BT2000 AUTOMATED MANIFOLD KIT

## (undated) DEVICE — ANTIBACTERIAL UNDYED BRAIDED (POLYGLACTIN 910), SYNTHETIC ABSORBABLE SUTURE: Brand: COATED VICRYL

## (undated) DEVICE — MODEL AT P65, P/N 701554-001KIT CONTENTS: HAND CONTROLLER, 3-WAY HIGH-PRESSURE STOPCOCK WITH ROTATING END AND PREMIUM HIGH-PRESSURE TUBING: Brand: ANGIOTOUCH® KIT

## (undated) DEVICE — SKIN PREP TRAY W/CHG: Brand: MEDLINE INDUSTRIES, INC.

## (undated) DEVICE — AIRWY SZ11

## (undated) DEVICE — CATH DIAG EXPO M/ PK 5F FL4/FR4 PIG

## (undated) DEVICE — GW INQWIRE FC PTFE STD J/1.5 .035 260

## (undated) DEVICE — ELECTRD BLD EZ CLN STD 2.5IN

## (undated) DEVICE — SOL LR 1000ML

## (undated) DEVICE — 3M™ STERI-STRIP™ REINFORCED ADHESIVE SKIN CLOSURES, R1547, 1/2 IN X 4 IN (12 MM X 100 MM), 6 STRIPS/ENVELOPE: Brand: 3M™ STERI-STRIP™

## (undated) DEVICE — PK NEURO DISC 10

## (undated) DEVICE — 3M™ STERI-STRIP™ ANTIMICROBIAL SKIN CLOSURES 1/2 INCH X 4 INCHES 50/CARTON 4 CARTONS/CASE A1847: Brand: 3M™ STERI-STRIP™

## (undated) DEVICE — PK CATH CARD 10

## (undated) DEVICE — NDL SPINE 18G 31/2IN PNK

## (undated) DEVICE — CANNULA,ADULT,SOFT-TOUCH,7TUBE,SC: Brand: MEDLINE

## (undated) DEVICE — INTRO SHEATH PRELUDE IDEAL SPRNG COIL 021 6F 23X80CM

## (undated) DEVICE — ADHS LIQ MASTISOL 2/3ML

## (undated) DEVICE — CATH DIAG EXPO .045 FL3  5F 100CM

## (undated) DEVICE — ENCORE® LATEX MICRO SIZE 6.5, STERILE LATEX POWDER-FREE SURGICAL GLOVE: Brand: ENCORE

## (undated) DEVICE — MEDI-VAC YANKAUER SUCTION HANDLE W/BULBOUS TIP: Brand: CARDINAL HEALTH

## (undated) DEVICE — SOL LR 500ML

## (undated) DEVICE — ENCORE® LATEX MICRO SIZE 7, STERILE LATEX POWDER-FREE SURGICAL GLOVE: Brand: ENCORE

## (undated) DEVICE — CATH DIAG EXPO .045 FL3.5 5F 100CM

## (undated) DEVICE — FLOSEAL MATRIX IS INDICATED IN SURGICAL PROCEDURES (OTHER THAN IN OPHTHALMIC) AS AN ADJUNCT TO HEMOSTASIS WHEN CONTROL OF BLEEDING BY LIGATURE OR CONVENTIONALPROCEDURES IS INEFFECTIVE OR IMPRACTICAL.: Brand: FLOSEAL HEMOSTATIC MATRIX

## (undated) DEVICE — ADULT, W/LG. BACK PAD, RADIOTRANSPARENT ELEMENT AND LEAD WIRE: Brand: DEFIBRILLATION ELECTRODES

## (undated) DEVICE — MEDI-VAC NON-CONDUCTIVE SUCTION TUBING: Brand: CARDINAL HEALTH

## (undated) DEVICE — ACCY PA700 LUBRICANT DIFFUSER MR7 4 PACK: Brand: MIDAS REX

## (undated) DEVICE — DRSNG TELFA PAD NONADH STR 1S 3X8IN

## (undated) DEVICE — ULTRACLEAN ACCESSORY ELECTRODE 4" (10.16 CM) COATED BLADE: Brand: ULTRACLEAN

## (undated) DEVICE — SPNG GZ WOVN 4X4IN 12PLY 10/BX STRL

## (undated) DEVICE — CORD BIPOLAR IRRIGATING DISP

## (undated) DEVICE — TOOL 14MH30 LEGEND 14CM 3MM: Brand: MIDAS REX ™

## (undated) DEVICE — 3M™ STERI-DRAPE™ INSTRUMENT POUCH 1018: Brand: STERI-DRAPE™